# Patient Record
Sex: FEMALE | Race: WHITE | NOT HISPANIC OR LATINO | URBAN - METROPOLITAN AREA
[De-identification: names, ages, dates, MRNs, and addresses within clinical notes are randomized per-mention and may not be internally consistent; named-entity substitution may affect disease eponyms.]

---

## 2020-06-05 ENCOUNTER — EMERGENCY (EMERGENCY)
Facility: HOSPITAL | Age: 80
LOS: 1 days | End: 2020-06-05
Admitting: EMERGENCY MEDICINE
Payer: MEDICARE

## 2020-06-05 PROCEDURE — 99284 EMERGENCY DEPT VISIT MOD MDM: CPT

## 2022-01-01 ENCOUNTER — RESULT REVIEW (OUTPATIENT)
Age: 82
End: 2022-01-01

## 2022-01-01 ENCOUNTER — APPOINTMENT (OUTPATIENT)
Dept: NEUROSURGERY | Facility: HOSPITAL | Age: 82
End: 2022-01-01
Payer: MEDICARE

## 2022-01-01 ENCOUNTER — INPATIENT (INPATIENT)
Facility: HOSPITAL | Age: 82
LOS: 24 days | DRG: 820 | End: 2022-02-07
Attending: STUDENT IN AN ORGANIZED HEALTH CARE EDUCATION/TRAINING PROGRAM | Admitting: NEUROLOGICAL SURGERY
Payer: MEDICARE

## 2022-01-01 ENCOUNTER — TRANSCRIPTION ENCOUNTER (OUTPATIENT)
Age: 82
End: 2022-01-01

## 2022-01-01 ENCOUNTER — EMERGENCY (EMERGENCY)
Facility: HOSPITAL | Age: 82
LOS: 1 days | Discharge: HOME | End: 2022-01-01
Admitting: EMERGENCY MEDICINE
Payer: MEDICARE

## 2022-01-01 VITALS
HEART RATE: 125 BPM | OXYGEN SATURATION: 92 % | SYSTOLIC BLOOD PRESSURE: 69 MMHG | RESPIRATION RATE: 20 BRPM | DIASTOLIC BLOOD PRESSURE: 49 MMHG

## 2022-01-01 VITALS
RESPIRATION RATE: 16 BRPM | HEART RATE: 67 BPM | DIASTOLIC BLOOD PRESSURE: 82 MMHG | TEMPERATURE: 99 F | OXYGEN SATURATION: 98 % | SYSTOLIC BLOOD PRESSURE: 156 MMHG

## 2022-01-01 DIAGNOSIS — G93.89 OTHER SPECIFIED DISORDERS OF BRAIN: ICD-10-CM
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
## 2022-01-01 DIAGNOSIS — S09.8XXA OTHER SPECIFIED INJURIES OF HEAD, INITIAL ENCOUNTER: ICD-10-CM
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
## 2022-01-01 DIAGNOSIS — I10 ESSENTIAL (PRIMARY) HYPERTENSION: ICD-10-CM
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
## 2022-01-01 DIAGNOSIS — R55 SYNCOPE AND COLLAPSE: ICD-10-CM

## 2022-01-01 LAB
-  AMIKACIN: SIGNIFICANT CHANGE UP
-  AMOXICILLIN/CLAVULANIC ACID: SIGNIFICANT CHANGE UP
-  AMPICILLIN/SULBACTAM: SIGNIFICANT CHANGE UP
-  AMPICILLIN: SIGNIFICANT CHANGE UP
-  AZTREONAM: SIGNIFICANT CHANGE UP
-  CANDIDA ALBICANS: SIGNIFICANT CHANGE UP
-  CANDIDA GLABRATA: SIGNIFICANT CHANGE UP
-  CANDIDA KRUSEI: SIGNIFICANT CHANGE UP
-  CANDIDA PARAPSILOSIS: SIGNIFICANT CHANGE UP
-  CANDIDA TROPICALIS: SIGNIFICANT CHANGE UP
-  CEFAZOLIN: SIGNIFICANT CHANGE UP
-  CEFEPIME: SIGNIFICANT CHANGE UP
-  CEFOXITIN: SIGNIFICANT CHANGE UP
-  CEFTRIAXONE: SIGNIFICANT CHANGE UP
-  CIPROFLOXACIN: SIGNIFICANT CHANGE UP
-  COAGULASE NEGATIVE STAPHYLOCOCCUS: SIGNIFICANT CHANGE UP
-  ERTAPENEM: SIGNIFICANT CHANGE UP
-  GENTAMICIN: SIGNIFICANT CHANGE UP
-  IMIPENEM: SIGNIFICANT CHANGE UP
-  K. PNEUMONIAE GROUP: SIGNIFICANT CHANGE UP
-  KPC RESISTANCE GENE: SIGNIFICANT CHANGE UP
-  LEVOFLOXACIN: SIGNIFICANT CHANGE UP
-  MEROPENEM: SIGNIFICANT CHANGE UP
-  NITROFURANTOIN: SIGNIFICANT CHANGE UP
-  PIPERACILLIN/TAZOBACTAM: SIGNIFICANT CHANGE UP
-  STREPTOCOCCUS SP. (NOT GRP A, B OR S PNEUMONIAE): SIGNIFICANT CHANGE UP
-  TIGECYCLINE: SIGNIFICANT CHANGE UP
-  TOBRAMYCIN: SIGNIFICANT CHANGE UP
-  TRIMETHOPRIM/SULFAMETHOXAZOLE: SIGNIFICANT CHANGE UP
A BAUMANNII DNA SPEC QL NAA+PROBE: SIGNIFICANT CHANGE UP
A1C WITH ESTIMATED AVERAGE GLUCOSE RESULT: 5 % — SIGNIFICANT CHANGE UP (ref 4–5.6)
AFP-TM SERPL-MCNC: 3 NG/ML — SIGNIFICANT CHANGE UP
ALBUMIN SERPL ELPH-MCNC: 1.7 G/DL — LOW (ref 3.3–5.2)
ALBUMIN SERPL ELPH-MCNC: 2.1 G/DL — LOW (ref 3.3–5.2)
ALBUMIN SERPL ELPH-MCNC: 2.2 G/DL — LOW (ref 3.3–5.2)
ALBUMIN SERPL ELPH-MCNC: 2.7 G/DL — LOW (ref 3.3–5.2)
ALBUMIN SERPL ELPH-MCNC: 3.4 G/DL — SIGNIFICANT CHANGE UP (ref 3.3–5.2)
ALBUMIN SERPL ELPH-MCNC: 3.5 G/DL — SIGNIFICANT CHANGE UP (ref 3.3–5.2)
ALBUMIN SERPL ELPH-MCNC: 3.6 G/DL — SIGNIFICANT CHANGE UP (ref 3.3–5.2)
ALBUMIN SERPL ELPH-MCNC: 3.9 G/DL — SIGNIFICANT CHANGE UP (ref 3.3–5.2)
ALBUMIN SERPL ELPH-MCNC: 3.9 G/DL — SIGNIFICANT CHANGE UP (ref 3.3–5.2)
ALBUMIN SERPL ELPH-MCNC: 4.2 G/DL — SIGNIFICANT CHANGE UP (ref 3.3–5.2)
ALP SERPL-CCNC: 101 U/L — SIGNIFICANT CHANGE UP (ref 40–120)
ALP SERPL-CCNC: 70 U/L — SIGNIFICANT CHANGE UP (ref 40–120)
ALP SERPL-CCNC: 70 U/L — SIGNIFICANT CHANGE UP (ref 40–120)
ALP SERPL-CCNC: 79 U/L — SIGNIFICANT CHANGE UP (ref 40–120)
ALP SERPL-CCNC: 86 U/L — SIGNIFICANT CHANGE UP (ref 40–120)
ALP SERPL-CCNC: 94 U/L — SIGNIFICANT CHANGE UP (ref 40–120)
ALP SERPL-CCNC: 94 U/L — SIGNIFICANT CHANGE UP (ref 40–120)
ALP SERPL-CCNC: 96 U/L — SIGNIFICANT CHANGE UP (ref 40–120)
ALP SERPL-CCNC: 97 U/L — SIGNIFICANT CHANGE UP (ref 40–120)
ALP SERPL-CCNC: 97 U/L — SIGNIFICANT CHANGE UP (ref 40–120)
ALT FLD-CCNC: 14 U/L — SIGNIFICANT CHANGE UP
ALT FLD-CCNC: 18 U/L — SIGNIFICANT CHANGE UP
ALT FLD-CCNC: 18 U/L — SIGNIFICANT CHANGE UP
ALT FLD-CCNC: 23 U/L — SIGNIFICANT CHANGE UP
ALT FLD-CCNC: 26 U/L — SIGNIFICANT CHANGE UP
ALT FLD-CCNC: 26 U/L — SIGNIFICANT CHANGE UP
ALT FLD-CCNC: 27 U/L — SIGNIFICANT CHANGE UP
ALT FLD-CCNC: 30 U/L — SIGNIFICANT CHANGE UP
ALT FLD-CCNC: 31 U/L — SIGNIFICANT CHANGE UP
ALT FLD-CCNC: 39 U/L — HIGH
ANION GAP SERPL CALC-SCNC: 11 MMOL/L — SIGNIFICANT CHANGE UP (ref 5–17)
ANION GAP SERPL CALC-SCNC: 12 MMOL/L — SIGNIFICANT CHANGE UP (ref 5–17)
ANION GAP SERPL CALC-SCNC: 13 MMOL/L — SIGNIFICANT CHANGE UP (ref 5–17)
ANION GAP SERPL CALC-SCNC: 14 MMOL/L — SIGNIFICANT CHANGE UP (ref 5–17)
ANION GAP SERPL CALC-SCNC: 14 MMOL/L — SIGNIFICANT CHANGE UP (ref 5–17)
ANION GAP SERPL CALC-SCNC: 15 MMOL/L — SIGNIFICANT CHANGE UP (ref 5–17)
ANION GAP SERPL CALC-SCNC: 9 MMOL/L — SIGNIFICANT CHANGE UP (ref 5–17)
ANION GAP SERPL CALC-SCNC: 9 MMOL/L — SIGNIFICANT CHANGE UP (ref 5–17)
APPEARANCE UR: ABNORMAL
APPEARANCE UR: ABNORMAL
APPEARANCE UR: CLEAR — SIGNIFICANT CHANGE UP
APPEARANCE UR: CLEAR — SIGNIFICANT CHANGE UP
APTT BLD: 30.6 SEC — SIGNIFICANT CHANGE UP (ref 27.5–35.5)
APTT BLD: 35.4 SEC — SIGNIFICANT CHANGE UP (ref 27.5–35.5)
APTT BLD: 35.8 SEC — HIGH (ref 27.5–35.5)
APTT BLD: 38 SEC — HIGH (ref 27.5–35.5)
AST SERPL-CCNC: 13 U/L — SIGNIFICANT CHANGE UP
AST SERPL-CCNC: 14 U/L — SIGNIFICANT CHANGE UP
AST SERPL-CCNC: 15 U/L — SIGNIFICANT CHANGE UP
AST SERPL-CCNC: 17 U/L — SIGNIFICANT CHANGE UP
AST SERPL-CCNC: 19 U/L — SIGNIFICANT CHANGE UP
AST SERPL-CCNC: 21 U/L — SIGNIFICANT CHANGE UP
AST SERPL-CCNC: 21 U/L — SIGNIFICANT CHANGE UP
AST SERPL-CCNC: 22 U/L — SIGNIFICANT CHANGE UP
AST SERPL-CCNC: 24 U/L — SIGNIFICANT CHANGE UP
AST SERPL-CCNC: 27 U/L — SIGNIFICANT CHANGE UP
BACTERIA # UR AUTO: ABNORMAL
BACTERIA # UR AUTO: ABNORMAL
BASOPHILS # BLD AUTO: 0 K/UL — SIGNIFICANT CHANGE UP (ref 0–0.2)
BASOPHILS # BLD AUTO: 0 K/UL — SIGNIFICANT CHANGE UP (ref 0–0.2)
BASOPHILS # BLD AUTO: 0.05 K/UL — SIGNIFICANT CHANGE UP (ref 0–0.2)
BASOPHILS # BLD AUTO: 0.07 K/UL — SIGNIFICANT CHANGE UP (ref 0–0.2)
BASOPHILS NFR BLD AUTO: 0 % — SIGNIFICANT CHANGE UP (ref 0–2)
BASOPHILS NFR BLD AUTO: 0 % — SIGNIFICANT CHANGE UP (ref 0–2)
BASOPHILS NFR BLD AUTO: 0.2 % — SIGNIFICANT CHANGE UP (ref 0–2)
BASOPHILS NFR BLD AUTO: 1.1 % — SIGNIFICANT CHANGE UP (ref 0–2)
BILIRUB DIRECT SERPL-MCNC: 0.1 MG/DL — SIGNIFICANT CHANGE UP (ref 0–0.3)
BILIRUB DIRECT SERPL-MCNC: 0.2 MG/DL — SIGNIFICANT CHANGE UP (ref 0–0.3)
BILIRUB INDIRECT FLD-MCNC: 0.3 MG/DL — SIGNIFICANT CHANGE UP (ref 0.2–1)
BILIRUB INDIRECT FLD-MCNC: 0.5 MG/DL — SIGNIFICANT CHANGE UP (ref 0.2–1)
BILIRUB SERPL-MCNC: 0.4 MG/DL — SIGNIFICANT CHANGE UP (ref 0.4–2)
BILIRUB SERPL-MCNC: 0.4 MG/DL — SIGNIFICANT CHANGE UP (ref 0.4–2)
BILIRUB SERPL-MCNC: 0.5 MG/DL — SIGNIFICANT CHANGE UP (ref 0.4–2)
BILIRUB SERPL-MCNC: 0.5 MG/DL — SIGNIFICANT CHANGE UP (ref 0.4–2)
BILIRUB SERPL-MCNC: 0.6 MG/DL — SIGNIFICANT CHANGE UP (ref 0.4–2)
BILIRUB SERPL-MCNC: 0.6 MG/DL — SIGNIFICANT CHANGE UP (ref 0.4–2)
BILIRUB SERPL-MCNC: 0.7 MG/DL — SIGNIFICANT CHANGE UP (ref 0.4–2)
BILIRUB SERPL-MCNC: 0.8 MG/DL — SIGNIFICANT CHANGE UP (ref 0.4–2)
BILIRUB SERPL-MCNC: 0.8 MG/DL — SIGNIFICANT CHANGE UP (ref 0.4–2)
BILIRUB SERPL-MCNC: 0.9 MG/DL — SIGNIFICANT CHANGE UP (ref 0.4–2)
BILIRUB UR-MCNC: NEGATIVE — SIGNIFICANT CHANGE UP
BLD GP AB SCN SERPL QL: SIGNIFICANT CHANGE UP
BUN SERPL-MCNC: 13.3 MG/DL — SIGNIFICANT CHANGE UP (ref 8–20)
BUN SERPL-MCNC: 14.1 MG/DL — SIGNIFICANT CHANGE UP (ref 8–20)
BUN SERPL-MCNC: 16.1 MG/DL — SIGNIFICANT CHANGE UP (ref 8–20)
BUN SERPL-MCNC: 16.3 MG/DL — SIGNIFICANT CHANGE UP (ref 8–20)
BUN SERPL-MCNC: 16.5 MG/DL — SIGNIFICANT CHANGE UP (ref 8–20)
BUN SERPL-MCNC: 17.4 MG/DL — SIGNIFICANT CHANGE UP (ref 8–20)
BUN SERPL-MCNC: 18.9 MG/DL — SIGNIFICANT CHANGE UP (ref 8–20)
BUN SERPL-MCNC: 19.4 MG/DL — SIGNIFICANT CHANGE UP (ref 8–20)
BUN SERPL-MCNC: 19.9 MG/DL — SIGNIFICANT CHANGE UP (ref 8–20)
BUN SERPL-MCNC: 20.2 MG/DL — HIGH (ref 8–20)
BUN SERPL-MCNC: 20.4 MG/DL — HIGH (ref 8–20)
BUN SERPL-MCNC: 21.2 MG/DL — HIGH (ref 8–20)
BUN SERPL-MCNC: 21.6 MG/DL — HIGH (ref 8–20)
BUN SERPL-MCNC: 21.7 MG/DL — HIGH (ref 8–20)
BUN SERPL-MCNC: 22.2 MG/DL — HIGH (ref 8–20)
BUN SERPL-MCNC: 22.3 MG/DL — HIGH (ref 8–20)
BUN SERPL-MCNC: 23.2 MG/DL — HIGH (ref 8–20)
BUN SERPL-MCNC: 27.5 MG/DL — HIGH (ref 8–20)
BUN SERPL-MCNC: 41 MG/DL — HIGH (ref 8–20)
BUN SERPL-MCNC: 42.4 MG/DL — HIGH (ref 8–20)
BUN SERPL-MCNC: 7.5 MG/DL — LOW (ref 8–20)
BUN SERPL-MCNC: 9.4 MG/DL — SIGNIFICANT CHANGE UP (ref 8–20)
BUN SERPL-MCNC: 9.8 MG/DL — SIGNIFICANT CHANGE UP (ref 8–20)
BURR CELLS BLD QL SMEAR: PRESENT — SIGNIFICANT CHANGE UP
BURR CELLS BLD QL SMEAR: PRESENT — SIGNIFICANT CHANGE UP
CALCIUM SERPL-MCNC: 7.9 MG/DL — LOW (ref 8.6–10.2)
CALCIUM SERPL-MCNC: 8 MG/DL — LOW (ref 8.6–10.2)
CALCIUM SERPL-MCNC: 8.2 MG/DL — LOW (ref 8.6–10.2)
CALCIUM SERPL-MCNC: 8.3 MG/DL — LOW (ref 8.6–10.2)
CALCIUM SERPL-MCNC: 8.4 MG/DL — LOW (ref 8.6–10.2)
CALCIUM SERPL-MCNC: 8.6 MG/DL — SIGNIFICANT CHANGE UP (ref 8.6–10.2)
CALCIUM SERPL-MCNC: 8.6 MG/DL — SIGNIFICANT CHANGE UP (ref 8.6–10.2)
CALCIUM SERPL-MCNC: 8.7 MG/DL — SIGNIFICANT CHANGE UP (ref 8.6–10.2)
CALCIUM SERPL-MCNC: 8.8 MG/DL — SIGNIFICANT CHANGE UP (ref 8.6–10.2)
CALCIUM SERPL-MCNC: 8.9 MG/DL — SIGNIFICANT CHANGE UP (ref 8.6–10.2)
CALCIUM SERPL-MCNC: 9.1 MG/DL — SIGNIFICANT CHANGE UP (ref 8.6–10.2)
CALCIUM SERPL-MCNC: 9.2 MG/DL — SIGNIFICANT CHANGE UP (ref 8.6–10.2)
CANCER AG19-9 SERPL-ACNC: 5 U/ML — SIGNIFICANT CHANGE UP
CEA SERPL-MCNC: 1.6 NG/ML — SIGNIFICANT CHANGE UP (ref 0–3.8)
CHLORIDE SERPL-SCNC: 100 MMOL/L — SIGNIFICANT CHANGE UP (ref 98–107)
CHLORIDE SERPL-SCNC: 101 MMOL/L — SIGNIFICANT CHANGE UP (ref 98–107)
CHLORIDE SERPL-SCNC: 102 MMOL/L — SIGNIFICANT CHANGE UP (ref 98–107)
CHLORIDE SERPL-SCNC: 103 MMOL/L — SIGNIFICANT CHANGE UP (ref 98–107)
CHLORIDE SERPL-SCNC: 103 MMOL/L — SIGNIFICANT CHANGE UP (ref 98–107)
CHLORIDE SERPL-SCNC: 104 MMOL/L — SIGNIFICANT CHANGE UP (ref 98–107)
CHLORIDE SERPL-SCNC: 105 MMOL/L — SIGNIFICANT CHANGE UP (ref 98–107)
CHLORIDE SERPL-SCNC: 106 MMOL/L — SIGNIFICANT CHANGE UP (ref 98–107)
CHLORIDE SERPL-SCNC: 94 MMOL/L — LOW (ref 98–107)
CHLORIDE SERPL-SCNC: 95 MMOL/L — LOW (ref 98–107)
CHLORIDE SERPL-SCNC: 95 MMOL/L — LOW (ref 98–107)
CHLORIDE SERPL-SCNC: 97 MMOL/L — LOW (ref 98–107)
CHLORIDE SERPL-SCNC: 98 MMOL/L — SIGNIFICANT CHANGE UP (ref 98–107)
CHLORIDE SERPL-SCNC: 99 MMOL/L — SIGNIFICANT CHANGE UP (ref 98–107)
CHOLEST SERPL-MCNC: 170 MG/DL — SIGNIFICANT CHANGE UP
CO2 SERPL-SCNC: 17 MMOL/L — LOW (ref 22–29)
CO2 SERPL-SCNC: 18 MMOL/L — LOW (ref 22–29)
CO2 SERPL-SCNC: 19 MMOL/L — LOW (ref 22–29)
CO2 SERPL-SCNC: 19 MMOL/L — LOW (ref 22–29)
CO2 SERPL-SCNC: 20 MMOL/L — LOW (ref 22–29)
CO2 SERPL-SCNC: 21 MMOL/L — LOW (ref 22–29)
CO2 SERPL-SCNC: 22 MMOL/L — SIGNIFICANT CHANGE UP (ref 22–29)
CO2 SERPL-SCNC: 23 MMOL/L — SIGNIFICANT CHANGE UP (ref 22–29)
CO2 SERPL-SCNC: 25 MMOL/L — SIGNIFICANT CHANGE UP (ref 22–29)
CO2 SERPL-SCNC: 25 MMOL/L — SIGNIFICANT CHANGE UP (ref 22–29)
CO2 SERPL-SCNC: 26 MMOL/L — SIGNIFICANT CHANGE UP (ref 22–29)
COLOR SPEC: YELLOW — SIGNIFICANT CHANGE UP
COMMENT - URINE: SIGNIFICANT CHANGE UP
CORTIS AM PEAK SERPL-MCNC: 1.8 UG/DL — LOW (ref 6–18.4)
CREAT SERPL-MCNC: 0.22 MG/DL — LOW (ref 0.5–1.3)
CREAT SERPL-MCNC: 0.29 MG/DL — LOW (ref 0.5–1.3)
CREAT SERPL-MCNC: 0.3 MG/DL — LOW (ref 0.5–1.3)
CREAT SERPL-MCNC: 0.31 MG/DL — LOW (ref 0.5–1.3)
CREAT SERPL-MCNC: 0.32 MG/DL — LOW (ref 0.5–1.3)
CREAT SERPL-MCNC: 0.33 MG/DL — LOW (ref 0.5–1.3)
CREAT SERPL-MCNC: 0.37 MG/DL — LOW (ref 0.5–1.3)
CREAT SERPL-MCNC: 0.37 MG/DL — LOW (ref 0.5–1.3)
CREAT SERPL-MCNC: 0.38 MG/DL — LOW (ref 0.5–1.3)
CREAT SERPL-MCNC: 0.38 MG/DL — LOW (ref 0.5–1.3)
CREAT SERPL-MCNC: 0.4 MG/DL — LOW (ref 0.5–1.3)
CREAT SERPL-MCNC: 0.41 MG/DL — LOW (ref 0.5–1.3)
CREAT SERPL-MCNC: 0.44 MG/DL — LOW (ref 0.5–1.3)
CREAT SERPL-MCNC: 0.47 MG/DL — LOW (ref 0.5–1.3)
CREAT SERPL-MCNC: 0.5 MG/DL — SIGNIFICANT CHANGE UP (ref 0.5–1.3)
CREAT SERPL-MCNC: 0.53 MG/DL — SIGNIFICANT CHANGE UP (ref 0.5–1.3)
CREAT SERPL-MCNC: 0.71 MG/DL — SIGNIFICANT CHANGE UP (ref 0.5–1.3)
CREAT SERPL-MCNC: 0.72 MG/DL — SIGNIFICANT CHANGE UP (ref 0.5–1.3)
CULTURE RESULTS: SIGNIFICANT CHANGE UP
DACRYOCYTES BLD QL SMEAR: SLIGHT — SIGNIFICANT CHANGE UP
DIFF PNL FLD: ABNORMAL
E CLOAC COMP DNA BLD POS QL NAA+PROBE: SIGNIFICANT CHANGE UP
E COLI DNA BLD POS QL NAA+NON-PROBE: SIGNIFICANT CHANGE UP
ENTEROCOC DNA BLD POS QL NAA+NON-PROBE: SIGNIFICANT CHANGE UP
ENTEROCOC DNA BLD POS QL NAA+NON-PROBE: SIGNIFICANT CHANGE UP
EOSINOPHIL # BLD AUTO: 0 K/UL — SIGNIFICANT CHANGE UP (ref 0–0.5)
EOSINOPHIL # BLD AUTO: 0.21 K/UL — SIGNIFICANT CHANGE UP (ref 0–0.5)
EOSINOPHIL NFR BLD AUTO: 0 % — SIGNIFICANT CHANGE UP (ref 0–6)
EOSINOPHIL NFR BLD AUTO: 3.2 % — SIGNIFICANT CHANGE UP (ref 0–6)
EPI CELLS # UR: SIGNIFICANT CHANGE UP
ESTIMATED AVERAGE GLUCOSE: 97 MG/DL — SIGNIFICANT CHANGE UP (ref 68–114)
GIANT PLATELETS BLD QL SMEAR: PRESENT — SIGNIFICANT CHANGE UP
GLUCOSE BLDC GLUCOMTR-MCNC: 101 MG/DL — HIGH (ref 70–99)
GLUCOSE BLDC GLUCOMTR-MCNC: 102 MG/DL — HIGH (ref 70–99)
GLUCOSE BLDC GLUCOMTR-MCNC: 113 MG/DL — HIGH (ref 70–99)
GLUCOSE BLDC GLUCOMTR-MCNC: 114 MG/DL — HIGH (ref 70–99)
GLUCOSE BLDC GLUCOMTR-MCNC: 115 MG/DL — HIGH (ref 70–99)
GLUCOSE BLDC GLUCOMTR-MCNC: 117 MG/DL — HIGH (ref 70–99)
GLUCOSE BLDC GLUCOMTR-MCNC: 117 MG/DL — HIGH (ref 70–99)
GLUCOSE BLDC GLUCOMTR-MCNC: 119 MG/DL — HIGH (ref 70–99)
GLUCOSE BLDC GLUCOMTR-MCNC: 121 MG/DL — HIGH (ref 70–99)
GLUCOSE BLDC GLUCOMTR-MCNC: 121 MG/DL — HIGH (ref 70–99)
GLUCOSE BLDC GLUCOMTR-MCNC: 123 MG/DL — HIGH (ref 70–99)
GLUCOSE BLDC GLUCOMTR-MCNC: 123 MG/DL — HIGH (ref 70–99)
GLUCOSE BLDC GLUCOMTR-MCNC: 124 MG/DL — HIGH (ref 70–99)
GLUCOSE BLDC GLUCOMTR-MCNC: 126 MG/DL — HIGH (ref 70–99)
GLUCOSE BLDC GLUCOMTR-MCNC: 126 MG/DL — HIGH (ref 70–99)
GLUCOSE BLDC GLUCOMTR-MCNC: 127 MG/DL — HIGH (ref 70–99)
GLUCOSE BLDC GLUCOMTR-MCNC: 129 MG/DL — HIGH (ref 70–99)
GLUCOSE BLDC GLUCOMTR-MCNC: 129 MG/DL — HIGH (ref 70–99)
GLUCOSE BLDC GLUCOMTR-MCNC: 131 MG/DL — HIGH (ref 70–99)
GLUCOSE BLDC GLUCOMTR-MCNC: 131 MG/DL — HIGH (ref 70–99)
GLUCOSE BLDC GLUCOMTR-MCNC: 133 MG/DL — HIGH (ref 70–99)
GLUCOSE BLDC GLUCOMTR-MCNC: 133 MG/DL — HIGH (ref 70–99)
GLUCOSE BLDC GLUCOMTR-MCNC: 134 MG/DL — HIGH (ref 70–99)
GLUCOSE BLDC GLUCOMTR-MCNC: 135 MG/DL — HIGH (ref 70–99)
GLUCOSE BLDC GLUCOMTR-MCNC: 136 MG/DL — HIGH (ref 70–99)
GLUCOSE BLDC GLUCOMTR-MCNC: 138 MG/DL — HIGH (ref 70–99)
GLUCOSE BLDC GLUCOMTR-MCNC: 138 MG/DL — HIGH (ref 70–99)
GLUCOSE BLDC GLUCOMTR-MCNC: 140 MG/DL — HIGH (ref 70–99)
GLUCOSE BLDC GLUCOMTR-MCNC: 143 MG/DL — HIGH (ref 70–99)
GLUCOSE BLDC GLUCOMTR-MCNC: 147 MG/DL — HIGH (ref 70–99)
GLUCOSE BLDC GLUCOMTR-MCNC: 148 MG/DL — HIGH (ref 70–99)
GLUCOSE BLDC GLUCOMTR-MCNC: 149 MG/DL — HIGH (ref 70–99)
GLUCOSE BLDC GLUCOMTR-MCNC: 150 MG/DL — HIGH (ref 70–99)
GLUCOSE BLDC GLUCOMTR-MCNC: 151 MG/DL — HIGH (ref 70–99)
GLUCOSE BLDC GLUCOMTR-MCNC: 152 MG/DL — HIGH (ref 70–99)
GLUCOSE BLDC GLUCOMTR-MCNC: 154 MG/DL — HIGH (ref 70–99)
GLUCOSE BLDC GLUCOMTR-MCNC: 159 MG/DL — HIGH (ref 70–99)
GLUCOSE BLDC GLUCOMTR-MCNC: 160 MG/DL — HIGH (ref 70–99)
GLUCOSE BLDC GLUCOMTR-MCNC: 166 MG/DL — HIGH (ref 70–99)
GLUCOSE BLDC GLUCOMTR-MCNC: 172 MG/DL — HIGH (ref 70–99)
GLUCOSE BLDC GLUCOMTR-MCNC: 187 MG/DL — HIGH (ref 70–99)
GLUCOSE BLDC GLUCOMTR-MCNC: 195 MG/DL — HIGH (ref 70–99)
GLUCOSE BLDC GLUCOMTR-MCNC: 197 MG/DL — HIGH (ref 70–99)
GLUCOSE BLDC GLUCOMTR-MCNC: 199 MG/DL — HIGH (ref 70–99)
GLUCOSE BLDC GLUCOMTR-MCNC: 201 MG/DL — HIGH (ref 70–99)
GLUCOSE BLDC GLUCOMTR-MCNC: 203 MG/DL — HIGH (ref 70–99)
GLUCOSE BLDC GLUCOMTR-MCNC: 242 MG/DL — HIGH (ref 70–99)
GLUCOSE BLDC GLUCOMTR-MCNC: 74 MG/DL — SIGNIFICANT CHANGE UP (ref 70–99)
GLUCOSE BLDC GLUCOMTR-MCNC: 84 MG/DL — SIGNIFICANT CHANGE UP (ref 70–99)
GLUCOSE BLDC GLUCOMTR-MCNC: 87 MG/DL — SIGNIFICANT CHANGE UP (ref 70–99)
GLUCOSE SERPL-MCNC: 102 MG/DL — HIGH (ref 70–99)
GLUCOSE SERPL-MCNC: 104 MG/DL — HIGH (ref 70–99)
GLUCOSE SERPL-MCNC: 106 MG/DL — HIGH (ref 70–99)
GLUCOSE SERPL-MCNC: 109 MG/DL — HIGH (ref 70–99)
GLUCOSE SERPL-MCNC: 110 MG/DL — HIGH (ref 70–99)
GLUCOSE SERPL-MCNC: 110 MG/DL — HIGH (ref 70–99)
GLUCOSE SERPL-MCNC: 116 MG/DL — HIGH (ref 70–99)
GLUCOSE SERPL-MCNC: 117 MG/DL — HIGH (ref 70–99)
GLUCOSE SERPL-MCNC: 117 MG/DL — HIGH (ref 70–99)
GLUCOSE SERPL-MCNC: 118 MG/DL — HIGH (ref 70–99)
GLUCOSE SERPL-MCNC: 123 MG/DL — HIGH (ref 70–99)
GLUCOSE SERPL-MCNC: 125 MG/DL — HIGH (ref 70–99)
GLUCOSE SERPL-MCNC: 128 MG/DL — HIGH (ref 70–99)
GLUCOSE SERPL-MCNC: 133 MG/DL — HIGH (ref 70–99)
GLUCOSE SERPL-MCNC: 135 MG/DL — HIGH (ref 70–99)
GLUCOSE SERPL-MCNC: 136 MG/DL — HIGH (ref 70–99)
GLUCOSE SERPL-MCNC: 149 MG/DL — HIGH (ref 70–99)
GLUCOSE SERPL-MCNC: 149 MG/DL — HIGH (ref 70–99)
GLUCOSE SERPL-MCNC: 152 MG/DL — HIGH (ref 70–99)
GLUCOSE SERPL-MCNC: 160 MG/DL — HIGH (ref 70–99)
GLUCOSE SERPL-MCNC: 239 MG/DL — HIGH (ref 70–99)
GLUCOSE SERPL-MCNC: 87 MG/DL — SIGNIFICANT CHANGE UP (ref 70–99)
GLUCOSE SERPL-MCNC: 93 MG/DL — SIGNIFICANT CHANGE UP (ref 70–99)
GLUCOSE UR QL: NEGATIVE MG/DL — SIGNIFICANT CHANGE UP
GP B STREP DNA BLD POS QL NAA+NON-PROBE: SIGNIFICANT CHANGE UP
GRAM STN FLD: SIGNIFICANT CHANGE UP
GRAM STN FLD: SIGNIFICANT CHANGE UP
HAEM INFLU DNA BLD POS QL NAA+NON-PROBE: SIGNIFICANT CHANGE UP
HCT VFR BLD CALC: 35.9 % — SIGNIFICANT CHANGE UP (ref 34.5–45)
HCT VFR BLD CALC: 36.8 % — SIGNIFICANT CHANGE UP (ref 34.5–45)
HCT VFR BLD CALC: 36.9 % — SIGNIFICANT CHANGE UP (ref 34.5–45)
HCT VFR BLD CALC: 36.9 % — SIGNIFICANT CHANGE UP (ref 34.5–45)
HCT VFR BLD CALC: 37.1 % — SIGNIFICANT CHANGE UP (ref 34.5–45)
HCT VFR BLD CALC: 38.3 % — SIGNIFICANT CHANGE UP (ref 34.5–45)
HCT VFR BLD CALC: 38.6 % — SIGNIFICANT CHANGE UP (ref 34.5–45)
HCT VFR BLD CALC: 39.4 % — SIGNIFICANT CHANGE UP (ref 34.5–45)
HCT VFR BLD CALC: 39.6 % — SIGNIFICANT CHANGE UP (ref 34.5–45)
HCT VFR BLD CALC: 39.7 % — SIGNIFICANT CHANGE UP (ref 34.5–45)
HCT VFR BLD CALC: 39.8 % — SIGNIFICANT CHANGE UP (ref 34.5–45)
HCT VFR BLD CALC: 40 % — SIGNIFICANT CHANGE UP (ref 34.5–45)
HCT VFR BLD CALC: 40.8 % — SIGNIFICANT CHANGE UP (ref 34.5–45)
HCT VFR BLD CALC: 40.8 % — SIGNIFICANT CHANGE UP (ref 34.5–45)
HCT VFR BLD CALC: 41.4 % — SIGNIFICANT CHANGE UP (ref 34.5–45)
HCT VFR BLD CALC: 41.6 % — SIGNIFICANT CHANGE UP (ref 34.5–45)
HCT VFR BLD CALC: 42.1 % — SIGNIFICANT CHANGE UP (ref 34.5–45)
HCT VFR BLD CALC: 43.5 % — SIGNIFICANT CHANGE UP (ref 34.5–45)
HDLC SERPL-MCNC: 64 MG/DL — SIGNIFICANT CHANGE UP
HGB BLD-MCNC: 12 G/DL — SIGNIFICANT CHANGE UP (ref 11.5–15.5)
HGB BLD-MCNC: 12.3 G/DL — SIGNIFICANT CHANGE UP (ref 11.5–15.5)
HGB BLD-MCNC: 12.4 G/DL — SIGNIFICANT CHANGE UP (ref 11.5–15.5)
HGB BLD-MCNC: 13.2 G/DL — SIGNIFICANT CHANGE UP (ref 11.5–15.5)
HGB BLD-MCNC: 13.3 G/DL — SIGNIFICANT CHANGE UP (ref 11.5–15.5)
HGB BLD-MCNC: 13.4 G/DL — SIGNIFICANT CHANGE UP (ref 11.5–15.5)
HGB BLD-MCNC: 13.5 G/DL — SIGNIFICANT CHANGE UP (ref 11.5–15.5)
HGB BLD-MCNC: 13.5 G/DL — SIGNIFICANT CHANGE UP (ref 11.5–15.5)
HGB BLD-MCNC: 13.7 G/DL — SIGNIFICANT CHANGE UP (ref 11.5–15.5)
HGB BLD-MCNC: 13.7 G/DL — SIGNIFICANT CHANGE UP (ref 11.5–15.5)
HGB BLD-MCNC: 13.9 G/DL — SIGNIFICANT CHANGE UP (ref 11.5–15.5)
HGB BLD-MCNC: 13.9 G/DL — SIGNIFICANT CHANGE UP (ref 11.5–15.5)
HGB BLD-MCNC: 14 G/DL — SIGNIFICANT CHANGE UP (ref 11.5–15.5)
HGB BLD-MCNC: 14.2 G/DL — SIGNIFICANT CHANGE UP (ref 11.5–15.5)
HGB BLD-MCNC: 14.3 G/DL — SIGNIFICANT CHANGE UP (ref 11.5–15.5)
HGB BLD-MCNC: 14.7 G/DL — SIGNIFICANT CHANGE UP (ref 11.5–15.5)
IMM GRANULOCYTES NFR BLD AUTO: 0.3 % — SIGNIFICANT CHANGE UP (ref 0–1.5)
IMM GRANULOCYTES NFR BLD AUTO: 1.2 % — SIGNIFICANT CHANGE UP (ref 0–1.5)
INR BLD: 0.96 RATIO — SIGNIFICANT CHANGE UP (ref 0.88–1.16)
INR BLD: 0.97 RATIO — SIGNIFICANT CHANGE UP (ref 0.88–1.16)
INR BLD: 1.02 RATIO — SIGNIFICANT CHANGE UP (ref 0.88–1.16)
INR BLD: 1.04 RATIO — SIGNIFICANT CHANGE UP (ref 0.88–1.16)
INR BLD: 1.05 RATIO — SIGNIFICANT CHANGE UP (ref 0.88–1.16)
INR BLD: 1.28 RATIO — HIGH (ref 0.88–1.16)
K OXYTOCA DNA BLD POS QL NAA+NON-PROBE: SIGNIFICANT CHANGE UP
KETONES UR-MCNC: ABNORMAL
KETONES UR-MCNC: ABNORMAL
KETONES UR-MCNC: NEGATIVE — SIGNIFICANT CHANGE UP
KETONES UR-MCNC: NEGATIVE — SIGNIFICANT CHANGE UP
L MONOCYTOG DNA BLD POS QL NAA+NON-PROBE: SIGNIFICANT CHANGE UP
LEUKOCYTE ESTERASE UR-ACNC: ABNORMAL
LEUKOCYTE ESTERASE UR-ACNC: NEGATIVE — SIGNIFICANT CHANGE UP
LIPID PNL WITH DIRECT LDL SERPL: 93 MG/DL — SIGNIFICANT CHANGE UP
LYMPHOCYTES # BLD AUTO: 0 % — LOW (ref 13–44)
LYMPHOCYTES # BLD AUTO: 0 K/UL — LOW (ref 1–3.3)
LYMPHOCYTES # BLD AUTO: 0.27 K/UL — LOW (ref 1–3.3)
LYMPHOCYTES # BLD AUTO: 0.29 K/UL — LOW (ref 1–3.3)
LYMPHOCYTES # BLD AUTO: 0.9 % — LOW (ref 13–44)
LYMPHOCYTES # BLD AUTO: 1.1 % — LOW (ref 13–44)
LYMPHOCYTES # BLD AUTO: 1.15 K/UL — SIGNIFICANT CHANGE UP (ref 1–3.3)
LYMPHOCYTES # BLD AUTO: 17.6 % — SIGNIFICANT CHANGE UP (ref 13–44)
MAGNESIUM SERPL-MCNC: 2 MG/DL — SIGNIFICANT CHANGE UP (ref 1.6–2.6)
MAGNESIUM SERPL-MCNC: 2.1 MG/DL — SIGNIFICANT CHANGE UP (ref 1.6–2.6)
MAGNESIUM SERPL-MCNC: 2.1 MG/DL — SIGNIFICANT CHANGE UP (ref 1.8–2.6)
MAGNESIUM SERPL-MCNC: 2.3 MG/DL — SIGNIFICANT CHANGE UP (ref 1.6–2.6)
MAGNESIUM SERPL-MCNC: 2.4 MG/DL — SIGNIFICANT CHANGE UP (ref 1.8–2.6)
MANUAL SMEAR VERIFICATION: SIGNIFICANT CHANGE UP
MANUAL SMEAR VERIFICATION: SIGNIFICANT CHANGE UP
MCHC RBC-ENTMCNC: 30.5 PG — SIGNIFICANT CHANGE UP (ref 27–34)
MCHC RBC-ENTMCNC: 30.9 PG — SIGNIFICANT CHANGE UP (ref 27–34)
MCHC RBC-ENTMCNC: 30.9 PG — SIGNIFICANT CHANGE UP (ref 27–34)
MCHC RBC-ENTMCNC: 31.2 PG — SIGNIFICANT CHANGE UP (ref 27–34)
MCHC RBC-ENTMCNC: 31.3 PG — SIGNIFICANT CHANGE UP (ref 27–34)
MCHC RBC-ENTMCNC: 31.4 PG — SIGNIFICANT CHANGE UP (ref 27–34)
MCHC RBC-ENTMCNC: 31.5 PG — SIGNIFICANT CHANGE UP (ref 27–34)
MCHC RBC-ENTMCNC: 31.6 PG — SIGNIFICANT CHANGE UP (ref 27–34)
MCHC RBC-ENTMCNC: 31.7 PG — SIGNIFICANT CHANGE UP (ref 27–34)
MCHC RBC-ENTMCNC: 31.7 PG — SIGNIFICANT CHANGE UP (ref 27–34)
MCHC RBC-ENTMCNC: 31.9 PG — SIGNIFICANT CHANGE UP (ref 27–34)
MCHC RBC-ENTMCNC: 32 PG — SIGNIFICANT CHANGE UP (ref 27–34)
MCHC RBC-ENTMCNC: 33.3 GM/DL — SIGNIFICANT CHANGE UP (ref 32–36)
MCHC RBC-ENTMCNC: 33.4 GM/DL — SIGNIFICANT CHANGE UP (ref 32–36)
MCHC RBC-ENTMCNC: 33.6 GM/DL — SIGNIFICANT CHANGE UP (ref 32–36)
MCHC RBC-ENTMCNC: 33.6 GM/DL — SIGNIFICANT CHANGE UP (ref 32–36)
MCHC RBC-ENTMCNC: 33.7 GM/DL — SIGNIFICANT CHANGE UP (ref 32–36)
MCHC RBC-ENTMCNC: 33.7 GM/DL — SIGNIFICANT CHANGE UP (ref 32–36)
MCHC RBC-ENTMCNC: 33.8 GM/DL — SIGNIFICANT CHANGE UP (ref 32–36)
MCHC RBC-ENTMCNC: 33.8 GM/DL — SIGNIFICANT CHANGE UP (ref 32–36)
MCHC RBC-ENTMCNC: 34 GM/DL — SIGNIFICANT CHANGE UP (ref 32–36)
MCHC RBC-ENTMCNC: 34.1 GM/DL — SIGNIFICANT CHANGE UP (ref 32–36)
MCHC RBC-ENTMCNC: 34.3 GM/DL — SIGNIFICANT CHANGE UP (ref 32–36)
MCHC RBC-ENTMCNC: 34.4 GM/DL — SIGNIFICANT CHANGE UP (ref 32–36)
MCHC RBC-ENTMCNC: 34.5 GM/DL — SIGNIFICANT CHANGE UP (ref 32–36)
MCHC RBC-ENTMCNC: 35 GM/DL — SIGNIFICANT CHANGE UP (ref 32–36)
MCV RBC AUTO: 91.2 FL — SIGNIFICANT CHANGE UP (ref 80–100)
MCV RBC AUTO: 91.3 FL — SIGNIFICANT CHANGE UP (ref 80–100)
MCV RBC AUTO: 91.5 FL — SIGNIFICANT CHANGE UP (ref 80–100)
MCV RBC AUTO: 91.6 FL — SIGNIFICANT CHANGE UP (ref 80–100)
MCV RBC AUTO: 91.6 FL — SIGNIFICANT CHANGE UP (ref 80–100)
MCV RBC AUTO: 91.7 FL — SIGNIFICANT CHANGE UP (ref 80–100)
MCV RBC AUTO: 91.8 FL — SIGNIFICANT CHANGE UP (ref 80–100)
MCV RBC AUTO: 91.9 FL — SIGNIFICANT CHANGE UP (ref 80–100)
MCV RBC AUTO: 92.1 FL — SIGNIFICANT CHANGE UP (ref 80–100)
MCV RBC AUTO: 92.1 FL — SIGNIFICANT CHANGE UP (ref 80–100)
MCV RBC AUTO: 92.4 FL — SIGNIFICANT CHANGE UP (ref 80–100)
MCV RBC AUTO: 92.9 FL — SIGNIFICANT CHANGE UP (ref 80–100)
MCV RBC AUTO: 92.9 FL — SIGNIFICANT CHANGE UP (ref 80–100)
MCV RBC AUTO: 93.4 FL — SIGNIFICANT CHANGE UP (ref 80–100)
MCV RBC AUTO: 93.4 FL — SIGNIFICANT CHANGE UP (ref 80–100)
MCV RBC AUTO: 93.5 FL — SIGNIFICANT CHANGE UP (ref 80–100)
MCV RBC AUTO: 93.7 FL — SIGNIFICANT CHANGE UP (ref 80–100)
MCV RBC AUTO: 93.9 FL — SIGNIFICANT CHANGE UP (ref 80–100)
METAMYELOCYTES # FLD: 0.9 % — HIGH (ref 0–0)
METAMYELOCYTES # FLD: 6.9 % — HIGH (ref 0–0)
METHOD TYPE: SIGNIFICANT CHANGE UP
METHOD TYPE: SIGNIFICANT CHANGE UP
MONOCYTES # BLD AUTO: 0.27 K/UL — SIGNIFICANT CHANGE UP (ref 0–0.9)
MONOCYTES # BLD AUTO: 0.27 K/UL — SIGNIFICANT CHANGE UP (ref 0–0.9)
MONOCYTES # BLD AUTO: 0.71 K/UL — SIGNIFICANT CHANGE UP (ref 0–0.9)
MONOCYTES # BLD AUTO: 0.73 K/UL — SIGNIFICANT CHANGE UP (ref 0–0.9)
MONOCYTES NFR BLD AUTO: 0.9 % — LOW (ref 2–14)
MONOCYTES NFR BLD AUTO: 1.7 % — LOW (ref 2–14)
MONOCYTES NFR BLD AUTO: 11.2 % — SIGNIFICANT CHANGE UP (ref 2–14)
MONOCYTES NFR BLD AUTO: 2.8 % — SIGNIFICANT CHANGE UP (ref 2–14)
MRSA SPEC QL CULT: SIGNIFICANT CHANGE UP
MSSA DNA SPEC QL NAA+PROBE: SIGNIFICANT CHANGE UP
MYELOCYTES NFR BLD: 0.9 % — HIGH (ref 0–0)
N MEN ISLT CULT: SIGNIFICANT CHANGE UP
NEUTROPHILS # BLD AUTO: 15.49 K/UL — HIGH (ref 1.8–7.4)
NEUTROPHILS # BLD AUTO: 24.09 K/UL — HIGH (ref 1.8–7.4)
NEUTROPHILS # BLD AUTO: 27.61 K/UL — HIGH (ref 1.8–7.4)
NEUTROPHILS # BLD AUTO: 4.35 K/UL — SIGNIFICANT CHANGE UP (ref 1.8–7.4)
NEUTROPHILS NFR BLD AUTO: 66.6 % — SIGNIFICANT CHANGE UP (ref 43–77)
NEUTROPHILS NFR BLD AUTO: 73.9 % — SIGNIFICANT CHANGE UP (ref 43–77)
NEUTROPHILS NFR BLD AUTO: 92.2 % — HIGH (ref 43–77)
NEUTROPHILS NFR BLD AUTO: 94.7 % — HIGH (ref 43–77)
NEUTS BAND # BLD: 16.5 % — HIGH (ref 0–8)
NEUTS BAND # BLD: 5.2 % — SIGNIFICANT CHANGE UP (ref 0–8)
NITRITE UR-MCNC: NEGATIVE — SIGNIFICANT CHANGE UP
NON HDL CHOLESTEROL: 106 MG/DL — SIGNIFICANT CHANGE UP
ORGANISM # SPEC MICROSCOPIC CNT: SIGNIFICANT CHANGE UP
OSMOLALITY SERPL: 293 MOSMOL/KG — SIGNIFICANT CHANGE UP (ref 280–301)
OSMOLALITY UR: 569 MOSM/KG — SIGNIFICANT CHANGE UP (ref 300–1000)
OVALOCYTES BLD QL SMEAR: SLIGHT — SIGNIFICANT CHANGE UP
OVALOCYTES BLD QL SMEAR: SLIGHT — SIGNIFICANT CHANGE UP
PH UR: 6 — SIGNIFICANT CHANGE UP (ref 5–8)
PH UR: 6.5 — SIGNIFICANT CHANGE UP (ref 5–8)
PH UR: 7 — SIGNIFICANT CHANGE UP (ref 5–8)
PH UR: 7 — SIGNIFICANT CHANGE UP (ref 5–8)
PHOSPHATE SERPL-MCNC: 2.2 MG/DL — LOW (ref 2.4–4.7)
PHOSPHATE SERPL-MCNC: 2.4 MG/DL — SIGNIFICANT CHANGE UP (ref 2.4–4.7)
PHOSPHATE SERPL-MCNC: 2.5 MG/DL — SIGNIFICANT CHANGE UP (ref 2.4–4.7)
PHOSPHATE SERPL-MCNC: 3.1 MG/DL — SIGNIFICANT CHANGE UP (ref 2.4–4.7)
PHOSPHATE SERPL-MCNC: 3.3 MG/DL — SIGNIFICANT CHANGE UP (ref 2.4–4.7)
PHOSPHATE SERPL-MCNC: 3.4 MG/DL — SIGNIFICANT CHANGE UP (ref 2.4–4.7)
PHOSPHATE SERPL-MCNC: 3.8 MG/DL — SIGNIFICANT CHANGE UP (ref 2.4–4.7)
PHOSPHATE SERPL-MCNC: 3.9 MG/DL — SIGNIFICANT CHANGE UP (ref 2.4–4.7)
PHOSPHATE SERPL-MCNC: 4 MG/DL — SIGNIFICANT CHANGE UP (ref 2.4–4.7)
PLAT MORPH BLD: ABNORMAL
PLAT MORPH BLD: NORMAL — SIGNIFICANT CHANGE UP
PLATELET # BLD AUTO: 139 K/UL — LOW (ref 150–400)
PLATELET # BLD AUTO: 154 K/UL — SIGNIFICANT CHANGE UP (ref 150–400)
PLATELET # BLD AUTO: 188 K/UL — SIGNIFICANT CHANGE UP (ref 150–400)
PLATELET # BLD AUTO: 249 K/UL — SIGNIFICANT CHANGE UP (ref 150–400)
PLATELET # BLD AUTO: 259 K/UL — SIGNIFICANT CHANGE UP (ref 150–400)
PLATELET # BLD AUTO: 261 K/UL — SIGNIFICANT CHANGE UP (ref 150–400)
PLATELET # BLD AUTO: 269 K/UL — SIGNIFICANT CHANGE UP (ref 150–400)
PLATELET # BLD AUTO: 270 K/UL — SIGNIFICANT CHANGE UP (ref 150–400)
PLATELET # BLD AUTO: 270 K/UL — SIGNIFICANT CHANGE UP (ref 150–400)
PLATELET # BLD AUTO: 273 K/UL — SIGNIFICANT CHANGE UP (ref 150–400)
PLATELET # BLD AUTO: 273 K/UL — SIGNIFICANT CHANGE UP (ref 150–400)
PLATELET # BLD AUTO: 276 K/UL — SIGNIFICANT CHANGE UP (ref 150–400)
PLATELET # BLD AUTO: 285 K/UL — SIGNIFICANT CHANGE UP (ref 150–400)
PLATELET # BLD AUTO: 286 K/UL — SIGNIFICANT CHANGE UP (ref 150–400)
PLATELET # BLD AUTO: 288 K/UL — SIGNIFICANT CHANGE UP (ref 150–400)
PLATELET # BLD AUTO: 294 K/UL — SIGNIFICANT CHANGE UP (ref 150–400)
PLATELET # BLD AUTO: 294 K/UL — SIGNIFICANT CHANGE UP (ref 150–400)
PLATELET # BLD AUTO: 332 K/UL — SIGNIFICANT CHANGE UP (ref 150–400)
POIKILOCYTOSIS BLD QL AUTO: SIGNIFICANT CHANGE UP
POIKILOCYTOSIS BLD QL AUTO: SIGNIFICANT CHANGE UP
POLYCHROMASIA BLD QL SMEAR: SLIGHT — SIGNIFICANT CHANGE UP
POTASSIUM SERPL-MCNC: 3.5 MMOL/L — SIGNIFICANT CHANGE UP (ref 3.5–5.3)
POTASSIUM SERPL-MCNC: 3.7 MMOL/L — SIGNIFICANT CHANGE UP (ref 3.5–5.3)
POTASSIUM SERPL-MCNC: 3.8 MMOL/L — SIGNIFICANT CHANGE UP (ref 3.5–5.3)
POTASSIUM SERPL-MCNC: 3.9 MMOL/L — SIGNIFICANT CHANGE UP (ref 3.5–5.3)
POTASSIUM SERPL-MCNC: 4.1 MMOL/L — SIGNIFICANT CHANGE UP (ref 3.5–5.3)
POTASSIUM SERPL-MCNC: 4.2 MMOL/L — SIGNIFICANT CHANGE UP (ref 3.5–5.3)
POTASSIUM SERPL-MCNC: 4.3 MMOL/L — SIGNIFICANT CHANGE UP (ref 3.5–5.3)
POTASSIUM SERPL-MCNC: 4.4 MMOL/L — SIGNIFICANT CHANGE UP (ref 3.5–5.3)
POTASSIUM SERPL-MCNC: 4.4 MMOL/L — SIGNIFICANT CHANGE UP (ref 3.5–5.3)
POTASSIUM SERPL-MCNC: 4.5 MMOL/L — SIGNIFICANT CHANGE UP (ref 3.5–5.3)
POTASSIUM SERPL-MCNC: 4.6 MMOL/L — SIGNIFICANT CHANGE UP (ref 3.5–5.3)
POTASSIUM SERPL-MCNC: 4.6 MMOL/L — SIGNIFICANT CHANGE UP (ref 3.5–5.3)
POTASSIUM SERPL-MCNC: 4.9 MMOL/L — SIGNIFICANT CHANGE UP (ref 3.5–5.3)
POTASSIUM SERPL-MCNC: 4.9 MMOL/L — SIGNIFICANT CHANGE UP (ref 3.5–5.3)
POTASSIUM SERPL-MCNC: 5.2 MMOL/L — SIGNIFICANT CHANGE UP (ref 3.5–5.3)
POTASSIUM SERPL-MCNC: 5.4 MMOL/L — HIGH (ref 3.5–5.3)
POTASSIUM SERPL-SCNC: 3.5 MMOL/L — SIGNIFICANT CHANGE UP (ref 3.5–5.3)
POTASSIUM SERPL-SCNC: 3.7 MMOL/L — SIGNIFICANT CHANGE UP (ref 3.5–5.3)
POTASSIUM SERPL-SCNC: 3.8 MMOL/L — SIGNIFICANT CHANGE UP (ref 3.5–5.3)
POTASSIUM SERPL-SCNC: 3.9 MMOL/L — SIGNIFICANT CHANGE UP (ref 3.5–5.3)
POTASSIUM SERPL-SCNC: 4.1 MMOL/L — SIGNIFICANT CHANGE UP (ref 3.5–5.3)
POTASSIUM SERPL-SCNC: 4.2 MMOL/L — SIGNIFICANT CHANGE UP (ref 3.5–5.3)
POTASSIUM SERPL-SCNC: 4.3 MMOL/L — SIGNIFICANT CHANGE UP (ref 3.5–5.3)
POTASSIUM SERPL-SCNC: 4.4 MMOL/L — SIGNIFICANT CHANGE UP (ref 3.5–5.3)
POTASSIUM SERPL-SCNC: 4.4 MMOL/L — SIGNIFICANT CHANGE UP (ref 3.5–5.3)
POTASSIUM SERPL-SCNC: 4.5 MMOL/L — SIGNIFICANT CHANGE UP (ref 3.5–5.3)
POTASSIUM SERPL-SCNC: 4.6 MMOL/L — SIGNIFICANT CHANGE UP (ref 3.5–5.3)
POTASSIUM SERPL-SCNC: 4.6 MMOL/L — SIGNIFICANT CHANGE UP (ref 3.5–5.3)
POTASSIUM SERPL-SCNC: 4.9 MMOL/L — SIGNIFICANT CHANGE UP (ref 3.5–5.3)
POTASSIUM SERPL-SCNC: 4.9 MMOL/L — SIGNIFICANT CHANGE UP (ref 3.5–5.3)
POTASSIUM SERPL-SCNC: 5.2 MMOL/L — SIGNIFICANT CHANGE UP (ref 3.5–5.3)
POTASSIUM SERPL-SCNC: 5.4 MMOL/L — HIGH (ref 3.5–5.3)
PREALB SERPL-MCNC: 20 MG/DL — SIGNIFICANT CHANGE UP (ref 18–38)
PROT SERPL-MCNC: 4.6 G/DL — LOW (ref 6.6–8.7)
PROT SERPL-MCNC: 4.8 G/DL — LOW (ref 6.6–8.7)
PROT SERPL-MCNC: 4.8 G/DL — LOW (ref 6.6–8.7)
PROT SERPL-MCNC: 5 G/DL — LOW (ref 6.6–8.7)
PROT SERPL-MCNC: 5 G/DL — LOW (ref 6.6–8.7)
PROT SERPL-MCNC: 5.1 G/DL — LOW (ref 6.6–8.7)
PROT SERPL-MCNC: 5.5 G/DL — LOW (ref 6.6–8.7)
PROT SERPL-MCNC: 5.7 G/DL — LOW (ref 6.6–8.7)
PROT SERPL-MCNC: 6 G/DL — LOW (ref 6.6–8.7)
PROT SERPL-MCNC: 6.1 G/DL — LOW (ref 6.6–8.7)
PROT UR-MCNC: 15
PROT UR-MCNC: NEGATIVE — SIGNIFICANT CHANGE UP
PROTEUS SP DNA BLD POS QL NAA+NON-PROBE: SIGNIFICANT CHANGE UP
PROTHROM AB SERPL-ACNC: 11.2 SEC — SIGNIFICANT CHANGE UP (ref 10.6–13.6)
PROTHROM AB SERPL-ACNC: 11.3 SEC — SIGNIFICANT CHANGE UP (ref 10.6–13.6)
PROTHROM AB SERPL-ACNC: 11.8 SEC — SIGNIFICANT CHANGE UP (ref 10.6–13.6)
PROTHROM AB SERPL-ACNC: 12 SEC — SIGNIFICANT CHANGE UP (ref 10.6–13.6)
PROTHROM AB SERPL-ACNC: 12.2 SEC — SIGNIFICANT CHANGE UP (ref 10.6–13.6)
PROTHROM AB SERPL-ACNC: 14.7 SEC — HIGH (ref 10.6–13.6)
RBC # BLD: 3.83 M/UL — SIGNIFICANT CHANGE UP (ref 3.8–5.2)
RBC # BLD: 3.94 M/UL — SIGNIFICANT CHANGE UP (ref 3.8–5.2)
RBC # BLD: 3.95 M/UL — SIGNIFICANT CHANGE UP (ref 3.8–5.2)
RBC # BLD: 3.97 M/UL — SIGNIFICANT CHANGE UP (ref 3.8–5.2)
RBC # BLD: 4.03 M/UL — SIGNIFICANT CHANGE UP (ref 3.8–5.2)
RBC # BLD: 4.17 M/UL — SIGNIFICANT CHANGE UP (ref 3.8–5.2)
RBC # BLD: 4.23 M/UL — SIGNIFICANT CHANGE UP (ref 3.8–5.2)
RBC # BLD: 4.3 M/UL — SIGNIFICANT CHANGE UP (ref 3.8–5.2)
RBC # BLD: 4.31 M/UL — SIGNIFICANT CHANGE UP (ref 3.8–5.2)
RBC # BLD: 4.32 M/UL — SIGNIFICANT CHANGE UP (ref 3.8–5.2)
RBC # BLD: 4.33 M/UL — SIGNIFICANT CHANGE UP (ref 3.8–5.2)
RBC # BLD: 4.37 M/UL — SIGNIFICANT CHANGE UP (ref 3.8–5.2)
RBC # BLD: 4.39 M/UL — SIGNIFICANT CHANGE UP (ref 3.8–5.2)
RBC # BLD: 4.39 M/UL — SIGNIFICANT CHANGE UP (ref 3.8–5.2)
RBC # BLD: 4.43 M/UL — SIGNIFICANT CHANGE UP (ref 3.8–5.2)
RBC # BLD: 4.48 M/UL — SIGNIFICANT CHANGE UP (ref 3.8–5.2)
RBC # BLD: 4.59 M/UL — SIGNIFICANT CHANGE UP (ref 3.8–5.2)
RBC # BLD: 4.75 M/UL — SIGNIFICANT CHANGE UP (ref 3.8–5.2)
RBC # FLD: 13.1 % — SIGNIFICANT CHANGE UP (ref 10.3–14.5)
RBC # FLD: 13.2 % — SIGNIFICANT CHANGE UP (ref 10.3–14.5)
RBC # FLD: 13.2 % — SIGNIFICANT CHANGE UP (ref 10.3–14.5)
RBC # FLD: 13.3 % — SIGNIFICANT CHANGE UP (ref 10.3–14.5)
RBC # FLD: 13.5 % — SIGNIFICANT CHANGE UP (ref 10.3–14.5)
RBC # FLD: 13.6 % — SIGNIFICANT CHANGE UP (ref 10.3–14.5)
RBC # FLD: 13.7 % — SIGNIFICANT CHANGE UP (ref 10.3–14.5)
RBC # FLD: 13.7 % — SIGNIFICANT CHANGE UP (ref 10.3–14.5)
RBC # FLD: 13.8 % — SIGNIFICANT CHANGE UP (ref 10.3–14.5)
RBC # FLD: 14 % — SIGNIFICANT CHANGE UP (ref 10.3–14.5)
RBC # FLD: 14.3 % — SIGNIFICANT CHANGE UP (ref 10.3–14.5)
RBC BLD AUTO: ABNORMAL
RBC BLD AUTO: ABNORMAL
RBC CASTS # UR COMP ASSIST: ABNORMAL /HPF (ref 0–4)
RBC CASTS # UR COMP ASSIST: SIGNIFICANT CHANGE UP /HPF (ref 0–4)
S MARCESCENS DNA BLD POS NAA+NON-PROBE: SIGNIFICANT CHANGE UP
S PNEUM DNA BLD POS QL NAA+NON-PROBE: SIGNIFICANT CHANGE UP
S PYO DNA BLD POS QL NAA+NON-PROBE: SIGNIFICANT CHANGE UP
SARS-COV-2 RNA SPEC QL NAA+PROBE: SIGNIFICANT CHANGE UP
SODIUM SERPL-SCNC: 127 MMOL/L — LOW (ref 135–145)
SODIUM SERPL-SCNC: 129 MMOL/L — LOW (ref 135–145)
SODIUM SERPL-SCNC: 130 MMOL/L — LOW (ref 135–145)
SODIUM SERPL-SCNC: 131 MMOL/L — LOW (ref 135–145)
SODIUM SERPL-SCNC: 132 MMOL/L — LOW (ref 135–145)
SODIUM SERPL-SCNC: 132 MMOL/L — LOW (ref 135–145)
SODIUM SERPL-SCNC: 133 MMOL/L — LOW (ref 135–145)
SODIUM SERPL-SCNC: 134 MMOL/L — LOW (ref 135–145)
SODIUM SERPL-SCNC: 135 MMOL/L — SIGNIFICANT CHANGE UP (ref 135–145)
SODIUM SERPL-SCNC: 136 MMOL/L — SIGNIFICANT CHANGE UP (ref 135–145)
SODIUM SERPL-SCNC: 136 MMOL/L — SIGNIFICANT CHANGE UP (ref 135–145)
SODIUM SERPL-SCNC: 137 MMOL/L — SIGNIFICANT CHANGE UP (ref 135–145)
SODIUM SERPL-SCNC: 137 MMOL/L — SIGNIFICANT CHANGE UP (ref 135–145)
SODIUM SERPL-SCNC: 138 MMOL/L — SIGNIFICANT CHANGE UP (ref 135–145)
SODIUM SERPL-SCNC: 139 MMOL/L — SIGNIFICANT CHANGE UP (ref 135–145)
SODIUM SERPL-SCNC: 139 MMOL/L — SIGNIFICANT CHANGE UP (ref 135–145)
SODIUM SERPL-SCNC: 140 MMOL/L — SIGNIFICANT CHANGE UP (ref 135–145)
SODIUM UR-SCNC: 39 MMOL/L — SIGNIFICANT CHANGE UP
SP GR SPEC: 1.01 — SIGNIFICANT CHANGE UP (ref 1.01–1.02)
SPECIMEN SOURCE: SIGNIFICANT CHANGE UP
SURGICAL PATHOLOGY STUDY: SIGNIFICANT CHANGE UP
SURGICAL PATHOLOGY STUDY: SIGNIFICANT CHANGE UP
TRIGL SERPL-MCNC: 64 MG/DL — SIGNIFICANT CHANGE UP
TSH SERPL-MCNC: 3.44 UIU/ML — SIGNIFICANT CHANGE UP (ref 0.27–4.2)
URATE SERPL-MCNC: 2.8 MG/DL — SIGNIFICANT CHANGE UP (ref 2.4–5.7)
UROBILINOGEN FLD QL: 4 MG/DL
UROBILINOGEN FLD QL: NEGATIVE MG/DL — SIGNIFICANT CHANGE UP
VALPROATE SERPL-MCNC: 56.2 UG/ML — SIGNIFICANT CHANGE UP (ref 50–100)
WBC # BLD: 10.37 K/UL — SIGNIFICANT CHANGE UP (ref 3.8–10.5)
WBC # BLD: 11.42 K/UL — HIGH (ref 3.8–10.5)
WBC # BLD: 11.61 K/UL — HIGH (ref 3.8–10.5)
WBC # BLD: 12.55 K/UL — HIGH (ref 3.8–10.5)
WBC # BLD: 12.77 K/UL — HIGH (ref 3.8–10.5)
WBC # BLD: 13.41 K/UL — HIGH (ref 3.8–10.5)
WBC # BLD: 15.9 K/UL — HIGH (ref 3.8–10.5)
WBC # BLD: 19.68 K/UL — HIGH (ref 3.8–10.5)
WBC # BLD: 19.73 K/UL — HIGH (ref 3.8–10.5)
WBC # BLD: 21.79 K/UL — HIGH (ref 3.8–10.5)
WBC # BLD: 25.44 K/UL — HIGH (ref 3.8–10.5)
WBC # BLD: 29.02 K/UL — HIGH (ref 3.8–10.5)
WBC # BLD: 30.38 K/UL — HIGH (ref 3.8–10.5)
WBC # BLD: 30.54 K/UL — HIGH (ref 3.8–10.5)
WBC # BLD: 5.64 K/UL — SIGNIFICANT CHANGE UP (ref 3.8–10.5)
WBC # BLD: 5.85 K/UL — SIGNIFICANT CHANGE UP (ref 3.8–10.5)
WBC # BLD: 6.46 K/UL — SIGNIFICANT CHANGE UP (ref 3.8–10.5)
WBC # BLD: 6.53 K/UL — SIGNIFICANT CHANGE UP (ref 3.8–10.5)
WBC # FLD AUTO: 10.37 K/UL — SIGNIFICANT CHANGE UP (ref 3.8–10.5)
WBC # FLD AUTO: 11.42 K/UL — HIGH (ref 3.8–10.5)
WBC # FLD AUTO: 11.61 K/UL — HIGH (ref 3.8–10.5)
WBC # FLD AUTO: 12.55 K/UL — HIGH (ref 3.8–10.5)
WBC # FLD AUTO: 12.77 K/UL — HIGH (ref 3.8–10.5)
WBC # FLD AUTO: 13.41 K/UL — HIGH (ref 3.8–10.5)
WBC # FLD AUTO: 15.9 K/UL — HIGH (ref 3.8–10.5)
WBC # FLD AUTO: 19.68 K/UL — HIGH (ref 3.8–10.5)
WBC # FLD AUTO: 19.73 K/UL — HIGH (ref 3.8–10.5)
WBC # FLD AUTO: 21.79 K/UL — HIGH (ref 3.8–10.5)
WBC # FLD AUTO: 25.44 K/UL — HIGH (ref 3.8–10.5)
WBC # FLD AUTO: 29.02 K/UL — HIGH (ref 3.8–10.5)
WBC # FLD AUTO: 30.38 K/UL — HIGH (ref 3.8–10.5)
WBC # FLD AUTO: 30.54 K/UL — HIGH (ref 3.8–10.5)
WBC # FLD AUTO: 5.64 K/UL — SIGNIFICANT CHANGE UP (ref 3.8–10.5)
WBC # FLD AUTO: 5.85 K/UL — SIGNIFICANT CHANGE UP (ref 3.8–10.5)
WBC # FLD AUTO: 6.46 K/UL — SIGNIFICANT CHANGE UP (ref 3.8–10.5)
WBC # FLD AUTO: 6.53 K/UL — SIGNIFICANT CHANGE UP (ref 3.8–10.5)
WBC UR QL: ABNORMAL /HPF (ref 0–5)
WBC UR QL: SIGNIFICANT CHANGE UP
WBC UR QL: SIGNIFICANT CHANGE UP
WBC UR QL: SIGNIFICANT CHANGE UP /HPF (ref 0–5)

## 2022-01-01 PROCEDURE — 99497 ADVNCD CARE PLAN 30 MIN: CPT | Mod: 25

## 2022-01-01 PROCEDURE — 71045 X-RAY EXAM CHEST 1 VIEW: CPT | Mod: 26

## 2022-01-01 PROCEDURE — 83930 ASSAY OF BLOOD OSMOLALITY: CPT

## 2022-01-01 PROCEDURE — 69990 MICROSURGERY ADD-ON: CPT | Mod: 82,59

## 2022-01-01 PROCEDURE — 80053 COMPREHEN METABOLIC PANEL: CPT

## 2022-01-01 PROCEDURE — 69990 MICROSURGERY ADD-ON: CPT | Mod: 59

## 2022-01-01 PROCEDURE — 99233 SBSQ HOSP IP/OBS HIGH 50: CPT

## 2022-01-01 PROCEDURE — 99024 POSTOP FOLLOW-UP VISIT: CPT

## 2022-01-01 PROCEDURE — 99233 SBSQ HOSP IP/OBS HIGH 50: CPT | Mod: GC

## 2022-01-01 PROCEDURE — 61510 CRNEC TREPH EXC BRN TUM STTL: CPT

## 2022-01-01 PROCEDURE — 71260 CT THORAX DX C+: CPT | Mod: MA

## 2022-01-01 PROCEDURE — 70450 CT HEAD/BRAIN W/O DYE: CPT | Mod: 26

## 2022-01-01 PROCEDURE — 85025 COMPLETE CBC W/AUTO DIFF WBC: CPT

## 2022-01-01 PROCEDURE — 88305 TISSUE EXAM BY PATHOLOGIST: CPT | Mod: 26,59

## 2022-01-01 PROCEDURE — 82248 BILIRUBIN DIRECT: CPT

## 2022-01-01 PROCEDURE — 99232 SBSQ HOSP IP/OBS MODERATE 35: CPT

## 2022-01-01 PROCEDURE — 85730 THROMBOPLASTIN TIME PARTIAL: CPT

## 2022-01-01 PROCEDURE — 88173 CYTOPATH EVAL FNA REPORT: CPT | Mod: 26

## 2022-01-01 PROCEDURE — 74018 RADEX ABDOMEN 1 VIEW: CPT

## 2022-01-01 PROCEDURE — 74176 CT ABD & PELVIS W/O CONTRAST: CPT

## 2022-01-01 PROCEDURE — C1713: CPT

## 2022-01-01 PROCEDURE — 61510 CRNEC TREPH EXC BRN TUM STTL: CPT | Mod: 82

## 2022-01-01 PROCEDURE — 99291 CRITICAL CARE FIRST HOUR: CPT | Mod: 24

## 2022-01-01 PROCEDURE — 43752 NASAL/OROGASTRIC W/TUBE PLMT: CPT

## 2022-01-01 PROCEDURE — 70450 CT HEAD/BRAIN W/O DYE: CPT

## 2022-01-01 PROCEDURE — 80061 LIPID PANEL: CPT

## 2022-01-01 PROCEDURE — 97167 OT EVAL HIGH COMPLEX 60 MIN: CPT

## 2022-01-01 PROCEDURE — 84100 ASSAY OF PHOSPHORUS: CPT

## 2022-01-01 PROCEDURE — 70450 CT HEAD/BRAIN W/O DYE: CPT | Mod: 26,77

## 2022-01-01 PROCEDURE — 93010 ELECTROCARDIOGRAM REPORT: CPT

## 2022-01-01 PROCEDURE — U0005: CPT

## 2022-01-01 PROCEDURE — 88342 IMHCHEM/IMCYTCHM 1ST ANTB: CPT | Mod: 26

## 2022-01-01 PROCEDURE — 36415 COLL VENOUS BLD VENIPUNCTURE: CPT

## 2022-01-01 PROCEDURE — 70553 MRI BRAIN STEM W/O & W/DYE: CPT | Mod: 26

## 2022-01-01 PROCEDURE — 83036 HEMOGLOBIN GLYCOSYLATED A1C: CPT

## 2022-01-01 PROCEDURE — G1004: CPT

## 2022-01-01 PROCEDURE — 87210 SMEAR WET MOUNT SALINE/INK: CPT

## 2022-01-01 PROCEDURE — 93306 TTE W/DOPPLER COMPLETE: CPT

## 2022-01-01 PROCEDURE — 81001 URINALYSIS AUTO W/SCOPE: CPT

## 2022-01-01 PROCEDURE — 74177 CT ABD & PELVIS W/CONTRAST: CPT | Mod: 26

## 2022-01-01 PROCEDURE — 70552 MRI BRAIN STEM W/DYE: CPT | Mod: 26

## 2022-01-01 PROCEDURE — 88172 CYTP DX EVAL FNA 1ST EA SITE: CPT | Mod: 26

## 2022-01-01 PROCEDURE — 70553 MRI BRAIN STEM W/O & W/DYE: CPT | Mod: MG

## 2022-01-01 PROCEDURE — 80048 BASIC METABOLIC PNL TOTAL CA: CPT

## 2022-01-01 PROCEDURE — 88173 CYTOPATH EVAL FNA REPORT: CPT

## 2022-01-01 PROCEDURE — 99223 1ST HOSP IP/OBS HIGH 75: CPT

## 2022-01-01 PROCEDURE — 43242 EGD US FINE NEEDLE BX/ASPIR: CPT

## 2022-01-01 PROCEDURE — 93005 ELECTROCARDIOGRAM TRACING: CPT

## 2022-01-01 PROCEDURE — 87086 URINE CULTURE/COLONY COUNT: CPT

## 2022-01-01 PROCEDURE — 88365 INSITU HYBRIDIZATION (FISH): CPT | Mod: 26

## 2022-01-01 PROCEDURE — 99498 ADVNCD CARE PLAN ADDL 30 MIN: CPT

## 2022-01-01 PROCEDURE — 93306 TTE W/DOPPLER COMPLETE: CPT | Mod: 26

## 2022-01-01 PROCEDURE — 70552 MRI BRAIN STEM W/DYE: CPT

## 2022-01-01 PROCEDURE — 88365 INSITU HYBRIDIZATION (FISH): CPT

## 2022-01-01 PROCEDURE — 99497 ADVNCD CARE PLAN 30 MIN: CPT

## 2022-01-01 PROCEDURE — 99222 1ST HOSP IP/OBS MODERATE 55: CPT

## 2022-01-01 PROCEDURE — 88342 IMHCHEM/IMCYTCHM 1ST ANTB: CPT

## 2022-01-01 PROCEDURE — 88307 TISSUE EXAM BY PATHOLOGIST: CPT

## 2022-01-01 PROCEDURE — 74183 MRI ABD W/O CNTR FLWD CNTR: CPT

## 2022-01-01 PROCEDURE — 87150 DNA/RNA AMPLIFIED PROBE: CPT

## 2022-01-01 PROCEDURE — 88331 PATH CONSLTJ SURG 1 BLK 1SPC: CPT

## 2022-01-01 PROCEDURE — 74177 CT ABD & PELVIS W/CONTRAST: CPT | Mod: MA

## 2022-01-01 PROCEDURE — 84134 ASSAY OF PREALBUMIN: CPT

## 2022-01-01 PROCEDURE — 72125 CT NECK SPINE W/O DYE: CPT | Mod: 26

## 2022-01-01 PROCEDURE — 61781 SCAN PROC CRANIAL INTRA: CPT | Mod: 82

## 2022-01-01 PROCEDURE — 84300 ASSAY OF URINE SODIUM: CPT

## 2022-01-01 PROCEDURE — 88341 IMHCHEM/IMCYTCHM EA ADD ANTB: CPT | Mod: 26

## 2022-01-01 PROCEDURE — 74183 MRI ABD W/O CNTR FLWD CNTR: CPT | Mod: 26

## 2022-01-01 PROCEDURE — C1889: CPT

## 2022-01-01 PROCEDURE — 86301 IMMUNOASSAY TUMOR CA 19-9: CPT

## 2022-01-01 PROCEDURE — 80164 ASSAY DIPROPYLACETIC ACD TOT: CPT

## 2022-01-01 PROCEDURE — 82962 GLUCOSE BLOOD TEST: CPT

## 2022-01-01 PROCEDURE — 61781 SCAN PROC CRANIAL INTRA: CPT

## 2022-01-01 PROCEDURE — 88305 TISSUE EXAM BY PATHOLOGIST: CPT | Mod: 26

## 2022-01-01 PROCEDURE — 99291 CRITICAL CARE FIRST HOUR: CPT

## 2022-01-01 PROCEDURE — 88331 PATH CONSLTJ SURG 1 BLK 1SPC: CPT | Mod: 26

## 2022-01-01 PROCEDURE — 95720 EEG PHY/QHP EA INCR W/VEEG: CPT

## 2022-01-01 PROCEDURE — 83935 ASSAY OF URINE OSMOLALITY: CPT

## 2022-01-01 PROCEDURE — C1763: CPT

## 2022-01-01 PROCEDURE — 99285 EMERGENCY DEPT VISIT HI MDM: CPT

## 2022-01-01 PROCEDURE — 43239 EGD BIOPSY SINGLE/MULTIPLE: CPT | Mod: 59

## 2022-01-01 PROCEDURE — 87186 SC STD MICRODIL/AGAR DIL: CPT

## 2022-01-01 PROCEDURE — C1769: CPT

## 2022-01-01 PROCEDURE — 84443 ASSAY THYROID STIM HORMONE: CPT

## 2022-01-01 PROCEDURE — 86901 BLOOD TYPING SEROLOGIC RH(D): CPT

## 2022-01-01 PROCEDURE — 71260 CT THORAX DX C+: CPT | Mod: 26

## 2022-01-01 PROCEDURE — 92610 EVALUATE SWALLOWING FUNCTION: CPT

## 2022-01-01 PROCEDURE — 85027 COMPLETE CBC AUTOMATED: CPT

## 2022-01-01 PROCEDURE — 74018 RADEX ABDOMEN 1 VIEW: CPT | Mod: 26

## 2022-01-01 PROCEDURE — 99498 ADVNCD CARE PLAN ADDL 30 MIN: CPT | Mod: 25

## 2022-01-01 PROCEDURE — 12345: CPT | Mod: NC

## 2022-01-01 PROCEDURE — 88341 IMHCHEM/IMCYTCHM EA ADD ANTB: CPT

## 2022-01-01 PROCEDURE — 87040 BLOOD CULTURE FOR BACTERIA: CPT

## 2022-01-01 PROCEDURE — 88307 TISSUE EXAM BY PATHOLOGIST: CPT | Mod: 26

## 2022-01-01 PROCEDURE — 82533 TOTAL CORTISOL: CPT

## 2022-01-01 PROCEDURE — 82378 CARCINOEMBRYONIC ANTIGEN: CPT

## 2022-01-01 PROCEDURE — 84550 ASSAY OF BLOOD/URIC ACID: CPT

## 2022-01-01 PROCEDURE — 86850 RBC ANTIBODY SCREEN: CPT

## 2022-01-01 PROCEDURE — 86900 BLOOD TYPING SEROLOGIC ABO: CPT

## 2022-01-01 PROCEDURE — 83735 ASSAY OF MAGNESIUM: CPT

## 2022-01-01 PROCEDURE — 85610 PROTHROMBIN TIME: CPT

## 2022-01-01 PROCEDURE — 80076 HEPATIC FUNCTION PANEL: CPT

## 2022-01-01 PROCEDURE — 87070 CULTURE OTHR SPECIMN AEROBIC: CPT

## 2022-01-01 PROCEDURE — 88172 CYTP DX EVAL FNA 1ST EA SITE: CPT

## 2022-01-01 PROCEDURE — 82105 ALPHA-FETOPROTEIN SERUM: CPT

## 2022-01-01 PROCEDURE — 95700 EEG CONT REC W/VID EEG TECH: CPT

## 2022-01-01 PROCEDURE — 99284 EMERGENCY DEPT VISIT MOD MDM: CPT

## 2022-01-01 PROCEDURE — U0003: CPT

## 2022-01-01 PROCEDURE — 87077 CULTURE AEROBIC IDENTIFY: CPT

## 2022-01-01 PROCEDURE — 74176 CT ABD & PELVIS W/O CONTRAST: CPT | Mod: 26

## 2022-01-01 PROCEDURE — 88305 TISSUE EXAM BY PATHOLOGIST: CPT

## 2022-01-01 PROCEDURE — 95714 VEEG EA 12-26 HR UNMNTR: CPT

## 2022-01-01 DEVICE — SPONGE HSTAT SURGICEL 2X14": Type: IMPLANTABLE DEVICE | Status: FUNCTIONAL

## 2022-01-01 DEVICE — GRAFT DURAL MATRX 3 X 3IN DURGN: Type: IMPLANTABLE DEVICE | Status: FUNCTIONAL

## 2022-01-01 DEVICE — IMPLANTABLE DEVICE: Type: IMPLANTABLE DEVICE | Status: FUNCTIONAL

## 2022-01-01 DEVICE — DURASEAL: Type: IMPLANTABLE DEVICE | Status: FUNCTIONAL

## 2022-01-01 DEVICE — KIT A-LINE 1LUM 20GX12CM SAFE KIT: Type: IMPLANTABLE DEVICE | Status: FUNCTIONAL

## 2022-01-01 DEVICE — PLATE COVER BURRHOLE UN3 W/TAB 10MM: Type: IMPLANTABLE DEVICE | Status: FUNCTIONAL

## 2022-01-01 DEVICE — SPONGE HSTAT SURGICEL FIBRILLAR 2X4": Type: IMPLANTABLE DEVICE | Status: FUNCTIONAL

## 2022-01-01 DEVICE — SCREW UN3 SLF DRILL 1.5X4MM: Type: IMPLANTABLE DEVICE | Status: FUNCTIONAL

## 2022-01-01 DEVICE — PIN SKULL AD DISP: Type: IMPLANTABLE DEVICE | Status: FUNCTIONAL

## 2022-01-01 DEVICE — SPONGE HSTAT GELFOAM 12X7MM: Type: IMPLANTABLE DEVICE | Status: FUNCTIONAL

## 2022-01-01 DEVICE — SEALANT FLOSEAL FAST PREP HEMOSTATIC MATRIX 10ML: Type: IMPLANTABLE DEVICE | Status: FUNCTIONAL

## 2022-01-01 RX ORDER — ALPRAZOLAM 0.25 MG
0.5 TABLET ORAL ONCE
Refills: 0 | Status: DISCONTINUED | OUTPATIENT
Start: 2022-01-01 | End: 2022-01-01

## 2022-01-01 RX ORDER — LABETALOL HCL 100 MG
10 TABLET ORAL
Refills: 0 | Status: DISCONTINUED | OUTPATIENT
Start: 2022-01-01 | End: 2022-01-01

## 2022-01-01 RX ORDER — HYDROMORPHONE HYDROCHLORIDE 2 MG/ML
0.1 INJECTION INTRAMUSCULAR; INTRAVENOUS; SUBCUTANEOUS
Qty: 100 | Refills: 0 | Status: DISCONTINUED | OUTPATIENT
Start: 2022-01-01 | End: 2022-01-01

## 2022-01-01 RX ORDER — DEXAMETHASONE 0.5 MG/5ML
ELIXIR ORAL
Refills: 0 | Status: DISCONTINUED | OUTPATIENT
Start: 2022-01-01 | End: 2022-01-01

## 2022-01-01 RX ORDER — ENOXAPARIN SODIUM 100 MG/ML
40 INJECTION SUBCUTANEOUS AT BEDTIME
Refills: 0 | Status: DISCONTINUED | OUTPATIENT
Start: 2022-01-01 | End: 2022-01-01

## 2022-01-01 RX ORDER — DEXAMETHASONE 0.5 MG/5ML
3 ELIXIR ORAL EVERY 6 HOURS
Refills: 0 | Status: COMPLETED | OUTPATIENT
Start: 2022-01-01 | End: 2022-01-01

## 2022-01-01 RX ORDER — POTASSIUM PHOSPHATE, MONOBASIC POTASSIUM PHOSPHATE, DIBASIC 236; 224 MG/ML; MG/ML
30 INJECTION, SOLUTION INTRAVENOUS ONCE
Refills: 0 | Status: COMPLETED | OUTPATIENT
Start: 2022-01-01 | End: 2022-01-01

## 2022-01-01 RX ORDER — HYDROMORPHONE HYDROCHLORIDE 2 MG/ML
0.3 INJECTION INTRAMUSCULAR; INTRAVENOUS; SUBCUTANEOUS
Refills: 0 | Status: DISCONTINUED | OUTPATIENT
Start: 2022-01-01 | End: 2022-01-01

## 2022-01-01 RX ORDER — FLUCONAZOLE 150 MG/1
100 TABLET ORAL DAILY
Refills: 0 | Status: DISCONTINUED | OUTPATIENT
Start: 2022-01-01 | End: 2022-01-01

## 2022-01-01 RX ORDER — DEXTROSE 50 % IN WATER 50 %
12.5 SYRINGE (ML) INTRAVENOUS ONCE
Refills: 0 | Status: DISCONTINUED | OUTPATIENT
Start: 2022-01-01 | End: 2022-01-01

## 2022-01-01 RX ORDER — OLANZAPINE 15 MG/1
2.5 TABLET, FILM COATED ORAL EVERY 12 HOURS
Refills: 0 | Status: DISCONTINUED | OUTPATIENT
Start: 2022-01-01 | End: 2022-01-01

## 2022-01-01 RX ORDER — ACETAMINOPHEN 500 MG
650 TABLET ORAL EVERY 6 HOURS
Refills: 0 | Status: DISCONTINUED | OUTPATIENT
Start: 2022-01-01 | End: 2022-01-01

## 2022-01-01 RX ORDER — LANOLIN ALCOHOL/MO/W.PET/CERES
5 CREAM (GRAM) TOPICAL AT BEDTIME
Refills: 0 | Status: DISCONTINUED | OUTPATIENT
Start: 2022-01-01 | End: 2022-01-01

## 2022-01-01 RX ORDER — ONDANSETRON 8 MG/1
4 TABLET, FILM COATED ORAL EVERY 6 HOURS
Refills: 0 | Status: DISCONTINUED | OUTPATIENT
Start: 2022-01-01 | End: 2022-01-01

## 2022-01-01 RX ORDER — HALOPERIDOL DECANOATE 100 MG/ML
2.5 INJECTION INTRAMUSCULAR ONCE
Refills: 0 | Status: COMPLETED | OUTPATIENT
Start: 2022-01-01 | End: 2022-01-01

## 2022-01-01 RX ORDER — PIPERACILLIN AND TAZOBACTAM 4; .5 G/20ML; G/20ML
3.38 INJECTION, POWDER, LYOPHILIZED, FOR SOLUTION INTRAVENOUS EVERY 8 HOURS
Refills: 0 | Status: DISCONTINUED | OUTPATIENT
Start: 2022-01-01 | End: 2022-01-01

## 2022-01-01 RX ORDER — ACETAMINOPHEN 500 MG
1000 TABLET ORAL ONCE
Refills: 0 | Status: COMPLETED | OUTPATIENT
Start: 2022-01-01 | End: 2022-01-01

## 2022-01-01 RX ORDER — HYDROMORPHONE HYDROCHLORIDE 2 MG/ML
1 INJECTION INTRAMUSCULAR; INTRAVENOUS; SUBCUTANEOUS
Refills: 0 | Status: DISCONTINUED | OUTPATIENT
Start: 2022-01-01 | End: 2022-01-01

## 2022-01-01 RX ORDER — POLYETHYLENE GLYCOL 3350 17 G/17G
17 POWDER, FOR SOLUTION ORAL DAILY
Refills: 0 | Status: DISCONTINUED | OUTPATIENT
Start: 2022-01-01 | End: 2022-01-01

## 2022-01-01 RX ORDER — SODIUM CHLORIDE 9 MG/ML
1000 INJECTION INTRAMUSCULAR; INTRAVENOUS; SUBCUTANEOUS
Refills: 0 | Status: DISCONTINUED | OUTPATIENT
Start: 2022-01-01 | End: 2022-01-01

## 2022-01-01 RX ORDER — GLUCAGON INJECTION, SOLUTION 0.5 MG/.1ML
1 INJECTION, SOLUTION SUBCUTANEOUS ONCE
Refills: 0 | Status: DISCONTINUED | OUTPATIENT
Start: 2022-01-01 | End: 2022-01-01

## 2022-01-01 RX ORDER — ACETAMINOPHEN 500 MG
1000 TABLET ORAL ONCE
Refills: 0 | Status: DISCONTINUED | OUTPATIENT
Start: 2022-01-01 | End: 2022-01-01

## 2022-01-01 RX ORDER — HYDROMORPHONE HYDROCHLORIDE 2 MG/ML
0.1 INJECTION INTRAMUSCULAR; INTRAVENOUS; SUBCUTANEOUS
Qty: 10 | Refills: 0 | Status: DISCONTINUED | OUTPATIENT
Start: 2022-01-01 | End: 2022-01-01

## 2022-01-01 RX ORDER — DEXAMETHASONE 0.5 MG/5ML
4 ELIXIR ORAL EVERY 6 HOURS
Refills: 0 | Status: DISCONTINUED | OUTPATIENT
Start: 2022-01-01 | End: 2022-01-01

## 2022-01-01 RX ORDER — DEXTROSE 50 % IN WATER 50 %
25 SYRINGE (ML) INTRAVENOUS ONCE
Refills: 0 | Status: DISCONTINUED | OUTPATIENT
Start: 2022-01-01 | End: 2022-01-01

## 2022-01-01 RX ORDER — HYDROMORPHONE HYDROCHLORIDE 2 MG/ML
0.3 INJECTION INTRAMUSCULAR; INTRAVENOUS; SUBCUTANEOUS EVERY 4 HOURS
Refills: 0 | Status: DISCONTINUED | OUTPATIENT
Start: 2022-01-01 | End: 2022-01-01

## 2022-01-01 RX ORDER — INSULIN LISPRO 100/ML
VIAL (ML) SUBCUTANEOUS
Refills: 0 | Status: DISCONTINUED | OUTPATIENT
Start: 2022-01-01 | End: 2022-01-01

## 2022-01-01 RX ORDER — VALPROIC ACID (AS SODIUM SALT) 250 MG/5ML
500 SOLUTION, ORAL ORAL EVERY 12 HOURS
Refills: 0 | Status: DISCONTINUED | OUTPATIENT
Start: 2022-01-01 | End: 2022-01-01

## 2022-01-01 RX ORDER — MORPHINE SULFATE 50 MG/1
1 CAPSULE, EXTENDED RELEASE ORAL ONCE
Refills: 0 | Status: DISCONTINUED | OUTPATIENT
Start: 2022-01-01 | End: 2022-01-01

## 2022-01-01 RX ORDER — DOXAZOSIN MESYLATE 4 MG
2 TABLET ORAL ONCE
Refills: 0 | Status: COMPLETED | OUTPATIENT
Start: 2022-01-01 | End: 2022-01-01

## 2022-01-01 RX ORDER — HYDRALAZINE HCL 50 MG
10 TABLET ORAL
Refills: 0 | Status: DISCONTINUED | OUTPATIENT
Start: 2022-01-01 | End: 2022-01-01

## 2022-01-01 RX ORDER — HYDROMORPHONE HYDROCHLORIDE 2 MG/ML
0.5 INJECTION INTRAMUSCULAR; INTRAVENOUS; SUBCUTANEOUS ONCE
Refills: 0 | Status: DISCONTINUED | OUTPATIENT
Start: 2022-01-01 | End: 2022-01-01

## 2022-01-01 RX ORDER — AMLODIPINE BESYLATE 2.5 MG/1
5 TABLET ORAL DAILY
Refills: 0 | Status: DISCONTINUED | OUTPATIENT
Start: 2022-01-01 | End: 2022-01-01

## 2022-01-01 RX ORDER — SENNA PLUS 8.6 MG/1
2 TABLET ORAL AT BEDTIME
Refills: 0 | Status: DISCONTINUED | OUTPATIENT
Start: 2022-01-01 | End: 2022-01-01

## 2022-01-01 RX ORDER — PANTOPRAZOLE SODIUM 20 MG/1
40 TABLET, DELAYED RELEASE ORAL DAILY
Refills: 0 | Status: DISCONTINUED | OUTPATIENT
Start: 2022-01-01 | End: 2022-01-01

## 2022-01-01 RX ORDER — MORPHINE SULFATE 50 MG/1
2 CAPSULE, EXTENDED RELEASE ORAL EVERY 6 HOURS
Refills: 0 | Status: DISCONTINUED | OUTPATIENT
Start: 2022-01-01 | End: 2022-01-01

## 2022-01-01 RX ORDER — LEVETIRACETAM 250 MG/1
500 TABLET, FILM COATED ORAL EVERY 12 HOURS
Refills: 0 | Status: DISCONTINUED | OUTPATIENT
Start: 2022-01-01 | End: 2022-01-01

## 2022-01-01 RX ORDER — PIPERACILLIN AND TAZOBACTAM 4; .5 G/20ML; G/20ML
3.38 INJECTION, POWDER, LYOPHILIZED, FOR SOLUTION INTRAVENOUS ONCE
Refills: 0 | Status: COMPLETED | OUTPATIENT
Start: 2022-01-01 | End: 2022-01-01

## 2022-01-01 RX ORDER — OXYCODONE HYDROCHLORIDE 5 MG/1
5 TABLET ORAL EVERY 6 HOURS
Refills: 0 | Status: DISCONTINUED | OUTPATIENT
Start: 2022-01-01 | End: 2022-01-01

## 2022-01-01 RX ORDER — DEXAMETHASONE 0.5 MG/5ML
6 ELIXIR ORAL EVERY 6 HOURS
Refills: 0 | Status: DISCONTINUED | OUTPATIENT
Start: 2022-01-01 | End: 2022-01-01

## 2022-01-01 RX ORDER — PANTOPRAZOLE SODIUM 20 MG/1
40 TABLET, DELAYED RELEASE ORAL EVERY 12 HOURS
Refills: 0 | Status: DISCONTINUED | OUTPATIENT
Start: 2022-01-01 | End: 2022-01-01

## 2022-01-01 RX ORDER — POLYETHYLENE GLYCOL 3350 17 G/17G
17 POWDER, FOR SOLUTION ORAL
Refills: 0 | Status: DISCONTINUED | OUTPATIENT
Start: 2022-01-01 | End: 2022-01-01

## 2022-01-01 RX ORDER — SODIUM CHLORIDE 9 MG/ML
1000 INJECTION, SOLUTION INTRAVENOUS
Refills: 0 | Status: DISCONTINUED | OUTPATIENT
Start: 2022-01-01 | End: 2022-01-01

## 2022-01-01 RX ORDER — SODIUM CHLORIDE 9 MG/ML
1 INJECTION INTRAMUSCULAR; INTRAVENOUS; SUBCUTANEOUS EVERY 12 HOURS
Refills: 0 | Status: DISCONTINUED | OUTPATIENT
Start: 2022-01-01 | End: 2022-01-01

## 2022-01-01 RX ORDER — HYDROMORPHONE HYDROCHLORIDE 2 MG/ML
0.3 INJECTION INTRAMUSCULAR; INTRAVENOUS; SUBCUTANEOUS
Qty: 100 | Refills: 0 | Status: DISCONTINUED | OUTPATIENT
Start: 2022-01-01 | End: 2022-01-01

## 2022-01-01 RX ORDER — INSULIN LISPRO 100/ML
VIAL (ML) SUBCUTANEOUS EVERY 6 HOURS
Refills: 0 | Status: DISCONTINUED | OUTPATIENT
Start: 2022-01-01 | End: 2022-01-01

## 2022-01-01 RX ORDER — DEXAMETHASONE 0.5 MG/5ML
2 ELIXIR ORAL EVERY 6 HOURS
Refills: 0 | Status: DISCONTINUED | OUTPATIENT
Start: 2022-01-01 | End: 2022-01-01

## 2022-01-01 RX ORDER — DEXMEDETOMIDINE HYDROCHLORIDE IN 0.9% SODIUM CHLORIDE 4 UG/ML
0.3 INJECTION INTRAVENOUS
Qty: 200 | Refills: 0 | Status: DISCONTINUED | OUTPATIENT
Start: 2022-01-01 | End: 2022-01-01

## 2022-01-01 RX ORDER — DEXAMETHASONE 0.5 MG/5ML
10 ELIXIR ORAL ONCE
Refills: 0 | Status: COMPLETED | OUTPATIENT
Start: 2022-01-01 | End: 2022-01-01

## 2022-01-01 RX ORDER — NICARDIPINE HYDROCHLORIDE 30 MG/1
5 CAPSULE, EXTENDED RELEASE ORAL
Qty: 40 | Refills: 0 | Status: DISCONTINUED | OUTPATIENT
Start: 2022-01-01 | End: 2022-01-01

## 2022-01-01 RX ORDER — HALOPERIDOL DECANOATE 100 MG/ML
1 INJECTION INTRAMUSCULAR EVERY 4 HOURS
Refills: 0 | Status: DISCONTINUED | OUTPATIENT
Start: 2022-01-01 | End: 2022-01-01

## 2022-01-01 RX ORDER — DEXTROSE 50 % IN WATER 50 %
15 SYRINGE (ML) INTRAVENOUS ONCE
Refills: 0 | Status: DISCONTINUED | OUTPATIENT
Start: 2022-01-01 | End: 2022-01-01

## 2022-01-01 RX ORDER — DOXAZOSIN MESYLATE 4 MG
4 TABLET ORAL AT BEDTIME
Refills: 0 | Status: DISCONTINUED | OUTPATIENT
Start: 2022-01-01 | End: 2022-01-01

## 2022-01-01 RX ORDER — DIAZEPAM 5 MG
1 TABLET ORAL ONCE
Refills: 0 | Status: DISCONTINUED | OUTPATIENT
Start: 2022-01-01 | End: 2022-01-01

## 2022-01-01 RX ORDER — METOPROLOL TARTRATE 50 MG
25 TABLET ORAL
Refills: 0 | Status: DISCONTINUED | OUTPATIENT
Start: 2022-01-01 | End: 2022-01-01

## 2022-01-01 RX ORDER — FLUCONAZOLE 150 MG/1
100 TABLET ORAL EVERY 24 HOURS
Refills: 0 | Status: DISCONTINUED | OUTPATIENT
Start: 2022-01-01 | End: 2022-01-01

## 2022-01-01 RX ORDER — SODIUM CHLORIDE 9 MG/ML
1 INJECTION INTRAMUSCULAR; INTRAVENOUS; SUBCUTANEOUS THREE TIMES A DAY
Refills: 0 | Status: DISCONTINUED | OUTPATIENT
Start: 2022-01-01 | End: 2022-01-01

## 2022-01-01 RX ORDER — POTASSIUM CHLORIDE 20 MEQ
10 PACKET (EA) ORAL
Refills: 0 | Status: COMPLETED | OUTPATIENT
Start: 2022-01-01 | End: 2022-01-01

## 2022-01-01 RX ORDER — DEXMEDETOMIDINE HYDROCHLORIDE IN 0.9% SODIUM CHLORIDE 4 UG/ML
0.1 INJECTION INTRAVENOUS
Qty: 200 | Refills: 0 | Status: DISCONTINUED | OUTPATIENT
Start: 2022-01-01 | End: 2022-01-01

## 2022-01-01 RX ORDER — CEFAZOLIN SODIUM 1 G
2000 VIAL (EA) INJECTION ONCE
Refills: 0 | Status: DISCONTINUED | OUTPATIENT
Start: 2022-01-01 | End: 2022-01-01

## 2022-01-01 RX ORDER — LABETALOL HCL 100 MG
10 TABLET ORAL ONCE
Refills: 0 | Status: COMPLETED | OUTPATIENT
Start: 2022-01-01 | End: 2022-01-01

## 2022-01-01 RX ORDER — LEVETIRACETAM 250 MG/1
2000 TABLET, FILM COATED ORAL ONCE
Refills: 0 | Status: DISCONTINUED | OUTPATIENT
Start: 2022-01-01 | End: 2022-01-01

## 2022-01-01 RX ORDER — VALPROIC ACID (AS SODIUM SALT) 250 MG/5ML
250 SOLUTION, ORAL ORAL
Refills: 0 | Status: DISCONTINUED | OUTPATIENT
Start: 2022-01-01 | End: 2022-01-01

## 2022-01-01 RX ORDER — FLUCONAZOLE 150 MG/1
200 TABLET ORAL ONCE
Refills: 0 | Status: COMPLETED | OUTPATIENT
Start: 2022-01-01 | End: 2022-01-01

## 2022-01-01 RX ORDER — LEVETIRACETAM 250 MG/1
2000 TABLET, FILM COATED ORAL ONCE
Refills: 0 | Status: COMPLETED | OUTPATIENT
Start: 2022-01-01 | End: 2022-01-01

## 2022-01-01 RX ORDER — ASCORBIC ACID 60 MG
500 TABLET,CHEWABLE ORAL DAILY
Refills: 0 | Status: DISCONTINUED | OUTPATIENT
Start: 2022-01-01 | End: 2022-01-01

## 2022-01-01 RX ORDER — OXYCODONE HYDROCHLORIDE 5 MG/1
10 TABLET ORAL EVERY 6 HOURS
Refills: 0 | Status: DISCONTINUED | OUTPATIENT
Start: 2022-01-01 | End: 2022-01-01

## 2022-01-01 RX ORDER — CEFTRIAXONE 500 MG/1
1000 INJECTION, POWDER, FOR SOLUTION INTRAMUSCULAR; INTRAVENOUS EVERY 24 HOURS
Refills: 0 | Status: COMPLETED | OUTPATIENT
Start: 2022-01-01 | End: 2022-01-01

## 2022-01-01 RX ORDER — HYDROMORPHONE HYDROCHLORIDE 2 MG/ML
1 INJECTION INTRAMUSCULAR; INTRAVENOUS; SUBCUTANEOUS
Qty: 100 | Refills: 0 | Status: DISCONTINUED | OUTPATIENT
Start: 2022-01-01 | End: 2022-01-01

## 2022-01-01 RX ORDER — POTASSIUM CHLORIDE 20 MEQ
40 PACKET (EA) ORAL ONCE
Refills: 0 | Status: DISCONTINUED | OUTPATIENT
Start: 2022-01-01 | End: 2022-01-01

## 2022-01-01 RX ORDER — DIAZEPAM 5 MG
2 TABLET ORAL ONCE
Refills: 0 | Status: DISCONTINUED | OUTPATIENT
Start: 2022-01-01 | End: 2022-01-01

## 2022-01-01 RX ORDER — SODIUM CHLORIDE 5 G/100ML
1000 INJECTION, SOLUTION INTRAVENOUS
Refills: 0 | Status: DISCONTINUED | OUTPATIENT
Start: 2022-01-01 | End: 2022-01-01

## 2022-01-01 RX ORDER — OXYCODONE HYDROCHLORIDE 5 MG/1
5 TABLET ORAL EVERY 4 HOURS
Refills: 0 | Status: DISCONTINUED | OUTPATIENT
Start: 2022-01-01 | End: 2022-01-01

## 2022-01-01 RX ORDER — OXYCODONE HYDROCHLORIDE 5 MG/1
5 TABLET ORAL ONCE
Refills: 0 | Status: DISCONTINUED | OUTPATIENT
Start: 2022-01-01 | End: 2022-01-01

## 2022-01-01 RX ORDER — PIPERACILLIN AND TAZOBACTAM 4; .5 G/20ML; G/20ML
INJECTION, POWDER, LYOPHILIZED, FOR SOLUTION INTRAVENOUS
Refills: 0 | Status: COMPLETED | OUTPATIENT
Start: 2022-01-01 | End: 2022-01-01

## 2022-01-01 RX ORDER — DOXAZOSIN MESYLATE 4 MG
2 TABLET ORAL DAILY
Refills: 0 | Status: DISCONTINUED | OUTPATIENT
Start: 2022-01-01 | End: 2022-01-01

## 2022-01-01 RX ORDER — DEXAMETHASONE 0.5 MG/5ML
3 ELIXIR ORAL EVERY 6 HOURS
Refills: 0 | Status: DISCONTINUED | OUTPATIENT
Start: 2022-01-01 | End: 2022-01-01

## 2022-01-01 RX ORDER — LEVETIRACETAM 250 MG/1
1000 TABLET, FILM COATED ORAL ONCE
Refills: 0 | Status: DISCONTINUED | OUTPATIENT
Start: 2022-01-01 | End: 2022-01-01

## 2022-01-01 RX ADMIN — Medication 27.5 MILLIGRAM(S): at 10:25

## 2022-01-01 RX ADMIN — Medication 400 MILLIGRAM(S): at 22:30

## 2022-01-01 RX ADMIN — Medication 2 MILLIGRAM(S): at 17:42

## 2022-01-01 RX ADMIN — PANTOPRAZOLE SODIUM 40 MILLIGRAM(S): 20 TABLET, DELAYED RELEASE ORAL at 12:54

## 2022-01-01 RX ADMIN — Medication 3 MILLIGRAM(S): at 17:33

## 2022-01-01 RX ADMIN — LEVETIRACETAM 420 MILLIGRAM(S): 250 TABLET, FILM COATED ORAL at 05:08

## 2022-01-01 RX ADMIN — Medication 10 MILLIGRAM(S): at 15:24

## 2022-01-01 RX ADMIN — POTASSIUM PHOSPHATE, MONOBASIC POTASSIUM PHOSPHATE, DIBASIC 83.33 MILLIMOLE(S): 236; 224 INJECTION, SOLUTION INTRAVENOUS at 06:16

## 2022-01-01 RX ADMIN — Medication 4: at 21:18

## 2022-01-01 RX ADMIN — ENOXAPARIN SODIUM 40 MILLIGRAM(S): 100 INJECTION SUBCUTANEOUS at 23:01

## 2022-01-01 RX ADMIN — Medication 10 MILLIGRAM(S): at 05:33

## 2022-01-01 RX ADMIN — HYDROMORPHONE HYDROCHLORIDE 0.3 MILLIGRAM(S): 2 INJECTION INTRAMUSCULAR; INTRAVENOUS; SUBCUTANEOUS at 07:30

## 2022-01-01 RX ADMIN — ENOXAPARIN SODIUM 40 MILLIGRAM(S): 100 INJECTION SUBCUTANEOUS at 21:28

## 2022-01-01 RX ADMIN — Medication 250 MILLIGRAM(S): at 17:33

## 2022-01-01 RX ADMIN — HYDROMORPHONE HYDROCHLORIDE 0.3 MILLIGRAM(S): 2 INJECTION INTRAMUSCULAR; INTRAVENOUS; SUBCUTANEOUS at 02:29

## 2022-01-01 RX ADMIN — LEVETIRACETAM 500 MILLIGRAM(S): 250 TABLET, FILM COATED ORAL at 04:49

## 2022-01-01 RX ADMIN — Medication 650 MILLIGRAM(S): at 22:56

## 2022-01-01 RX ADMIN — Medication 3 MILLIGRAM(S): at 11:16

## 2022-01-01 RX ADMIN — Medication 650 MILLIGRAM(S): at 21:00

## 2022-01-01 RX ADMIN — Medication 1000 MILLIGRAM(S): at 21:30

## 2022-01-01 RX ADMIN — HYDROMORPHONE HYDROCHLORIDE 0.3 MILLIGRAM(S): 2 INJECTION INTRAMUSCULAR; INTRAVENOUS; SUBCUTANEOUS at 03:26

## 2022-01-01 RX ADMIN — LEVETIRACETAM 500 MILLIGRAM(S): 250 TABLET, FILM COATED ORAL at 05:01

## 2022-01-01 RX ADMIN — Medication 3 MILLIGRAM(S): at 17:18

## 2022-01-01 RX ADMIN — HYDROMORPHONE HYDROCHLORIDE 0.3 MILLIGRAM(S): 2 INJECTION INTRAMUSCULAR; INTRAVENOUS; SUBCUTANEOUS at 22:21

## 2022-01-01 RX ADMIN — Medication 2 MILLIGRAM(S): at 15:51

## 2022-01-01 RX ADMIN — Medication 4 MILLIGRAM(S): at 08:14

## 2022-01-01 RX ADMIN — Medication 500 MILLIGRAM(S): at 22:41

## 2022-01-01 RX ADMIN — Medication 3 MILLIGRAM(S): at 22:57

## 2022-01-01 RX ADMIN — PANTOPRAZOLE SODIUM 40 MILLIGRAM(S): 20 TABLET, DELAYED RELEASE ORAL at 09:49

## 2022-01-01 RX ADMIN — AMLODIPINE BESYLATE 5 MILLIGRAM(S): 2.5 TABLET ORAL at 12:53

## 2022-01-01 RX ADMIN — SODIUM CHLORIDE 1 GRAM(S): 9 INJECTION INTRAMUSCULAR; INTRAVENOUS; SUBCUTANEOUS at 05:36

## 2022-01-01 RX ADMIN — Medication 250 MILLIGRAM(S): at 05:36

## 2022-01-01 RX ADMIN — Medication 10 MILLIGRAM(S): at 08:25

## 2022-01-01 RX ADMIN — Medication 650 MILLIGRAM(S): at 04:31

## 2022-01-01 RX ADMIN — Medication 6 MILLIGRAM(S): at 05:40

## 2022-01-01 RX ADMIN — OXYCODONE HYDROCHLORIDE 5 MILLIGRAM(S): 5 TABLET ORAL at 00:44

## 2022-01-01 RX ADMIN — Medication 6 MILLIGRAM(S): at 18:10

## 2022-01-01 RX ADMIN — Medication 4 MILLIGRAM(S): at 22:07

## 2022-01-01 RX ADMIN — AMLODIPINE BESYLATE 5 MILLIGRAM(S): 2.5 TABLET ORAL at 06:15

## 2022-01-01 RX ADMIN — Medication 650 MILLIGRAM(S): at 05:50

## 2022-01-01 RX ADMIN — Medication 250 MILLIGRAM(S): at 17:39

## 2022-01-01 RX ADMIN — Medication 2 MILLIGRAM(S): at 17:28

## 2022-01-01 RX ADMIN — Medication 4 MILLIGRAM(S): at 00:35

## 2022-01-01 RX ADMIN — Medication 1000 MILLIGRAM(S): at 23:00

## 2022-01-01 RX ADMIN — SODIUM CHLORIDE 1 GRAM(S): 9 INJECTION INTRAMUSCULAR; INTRAVENOUS; SUBCUTANEOUS at 13:05

## 2022-01-01 RX ADMIN — POLYETHYLENE GLYCOL 3350 17 GRAM(S): 17 POWDER, FOR SOLUTION ORAL at 17:53

## 2022-01-01 RX ADMIN — Medication 10 MILLIGRAM(S): at 23:20

## 2022-01-01 RX ADMIN — Medication 250 MILLIGRAM(S): at 05:47

## 2022-01-01 RX ADMIN — Medication 4 MILLIGRAM(S): at 21:28

## 2022-01-01 RX ADMIN — Medication 10 MILLIGRAM(S): at 18:14

## 2022-01-01 RX ADMIN — Medication 10 MILLIGRAM(S): at 01:23

## 2022-01-01 RX ADMIN — OXYCODONE HYDROCHLORIDE 5 MILLIGRAM(S): 5 TABLET ORAL at 18:31

## 2022-01-01 RX ADMIN — Medication 27.5 MILLIGRAM(S): at 10:14

## 2022-01-01 RX ADMIN — OXYCODONE HYDROCHLORIDE 5 MILLIGRAM(S): 5 TABLET ORAL at 16:50

## 2022-01-01 RX ADMIN — ENOXAPARIN SODIUM 40 MILLIGRAM(S): 100 INJECTION SUBCUTANEOUS at 22:57

## 2022-01-01 RX ADMIN — Medication 27.5 MILLIGRAM(S): at 22:30

## 2022-01-01 RX ADMIN — Medication 250 MILLIGRAM(S): at 05:32

## 2022-01-01 RX ADMIN — Medication 2: at 11:59

## 2022-01-01 RX ADMIN — Medication 250 MILLIGRAM(S): at 17:19

## 2022-01-01 RX ADMIN — Medication 27.5 MILLIGRAM(S): at 22:35

## 2022-01-01 RX ADMIN — Medication 3 MILLIGRAM(S): at 05:19

## 2022-01-01 RX ADMIN — Medication 10 MILLIGRAM(S): at 14:49

## 2022-01-01 RX ADMIN — Medication 100 MILLIEQUIVALENT(S): at 06:30

## 2022-01-01 RX ADMIN — Medication 250 MILLIGRAM(S): at 17:28

## 2022-01-01 RX ADMIN — AMLODIPINE BESYLATE 5 MILLIGRAM(S): 2.5 TABLET ORAL at 05:47

## 2022-01-01 RX ADMIN — FLUCONAZOLE 200 MILLIGRAM(S): 150 TABLET ORAL at 12:34

## 2022-01-01 RX ADMIN — MORPHINE SULFATE 1 MILLIGRAM(S): 50 CAPSULE, EXTENDED RELEASE ORAL at 05:30

## 2022-01-01 RX ADMIN — OXYCODONE HYDROCHLORIDE 5 MILLIGRAM(S): 5 TABLET ORAL at 18:09

## 2022-01-01 RX ADMIN — Medication 25 MILLIGRAM(S): at 17:53

## 2022-01-01 RX ADMIN — Medication 2 MILLIGRAM(S): at 17:19

## 2022-01-01 RX ADMIN — Medication 650 MILLIGRAM(S): at 20:37

## 2022-01-01 RX ADMIN — ENOXAPARIN SODIUM 40 MILLIGRAM(S): 100 INJECTION SUBCUTANEOUS at 20:34

## 2022-01-01 RX ADMIN — Medication 4 MILLIGRAM(S): at 14:09

## 2022-01-01 RX ADMIN — OXYCODONE HYDROCHLORIDE 10 MILLIGRAM(S): 5 TABLET ORAL at 23:30

## 2022-01-01 RX ADMIN — ENOXAPARIN SODIUM 40 MILLIGRAM(S): 100 INJECTION SUBCUTANEOUS at 22:37

## 2022-01-01 RX ADMIN — Medication 3 MILLIGRAM(S): at 12:05

## 2022-01-01 RX ADMIN — Medication 1000 MILLIGRAM(S): at 12:42

## 2022-01-01 RX ADMIN — Medication 5 MILLIGRAM(S): at 21:29

## 2022-01-01 RX ADMIN — SODIUM CHLORIDE 60 MILLILITER(S): 9 INJECTION INTRAMUSCULAR; INTRAVENOUS; SUBCUTANEOUS at 09:26

## 2022-01-01 RX ADMIN — Medication 2: at 11:26

## 2022-01-01 RX ADMIN — Medication 2 MILLIGRAM(S): at 13:05

## 2022-01-01 RX ADMIN — Medication 3 MILLIGRAM(S): at 00:21

## 2022-01-01 RX ADMIN — SODIUM CHLORIDE 50 MILLILITER(S): 5 INJECTION, SOLUTION INTRAVENOUS at 23:48

## 2022-01-01 RX ADMIN — Medication 4 MILLIGRAM(S): at 14:31

## 2022-01-01 RX ADMIN — SODIUM CHLORIDE 1 GRAM(S): 9 INJECTION INTRAMUSCULAR; INTRAVENOUS; SUBCUTANEOUS at 05:23

## 2022-01-01 RX ADMIN — Medication 27.5 MILLIGRAM(S): at 10:38

## 2022-01-01 RX ADMIN — SODIUM CHLORIDE 100 MILLILITER(S): 9 INJECTION INTRAMUSCULAR; INTRAVENOUS; SUBCUTANEOUS at 14:15

## 2022-01-01 RX ADMIN — Medication 10 MILLIGRAM(S): at 12:59

## 2022-01-01 RX ADMIN — ENOXAPARIN SODIUM 40 MILLIGRAM(S): 100 INJECTION SUBCUTANEOUS at 22:42

## 2022-01-01 RX ADMIN — Medication 3 MILLIGRAM(S): at 12:00

## 2022-01-01 RX ADMIN — Medication 650 MILLIGRAM(S): at 21:50

## 2022-01-01 RX ADMIN — Medication 500 MILLIGRAM(S): at 14:14

## 2022-01-01 RX ADMIN — Medication 1 TABLET(S): at 11:30

## 2022-01-01 RX ADMIN — Medication 5 MILLIGRAM(S): at 23:08

## 2022-01-01 RX ADMIN — SODIUM CHLORIDE 1 GRAM(S): 9 INJECTION INTRAMUSCULAR; INTRAVENOUS; SUBCUTANEOUS at 17:39

## 2022-01-01 RX ADMIN — Medication 3 MILLIGRAM(S): at 05:38

## 2022-01-01 RX ADMIN — Medication 0.5 MILLIGRAM(S): at 18:05

## 2022-01-01 RX ADMIN — SODIUM CHLORIDE 1 GRAM(S): 9 INJECTION INTRAMUSCULAR; INTRAVENOUS; SUBCUTANEOUS at 05:24

## 2022-01-01 RX ADMIN — SODIUM CHLORIDE 1 GRAM(S): 9 INJECTION INTRAMUSCULAR; INTRAVENOUS; SUBCUTANEOUS at 17:53

## 2022-01-01 RX ADMIN — Medication 2: at 16:21

## 2022-01-01 RX ADMIN — Medication 1 MILLIGRAM(S): at 21:43

## 2022-01-01 RX ADMIN — Medication 1 TABLET(S): at 13:19

## 2022-01-01 RX ADMIN — HYDROMORPHONE HYDROCHLORIDE 0.3 MILLIGRAM(S): 2 INJECTION INTRAMUSCULAR; INTRAVENOUS; SUBCUTANEOUS at 07:45

## 2022-01-01 RX ADMIN — Medication 3 MILLIGRAM(S): at 11:27

## 2022-01-01 RX ADMIN — Medication 5 MILLIGRAM(S): at 22:05

## 2022-01-01 RX ADMIN — Medication 10 MILLIGRAM(S): at 06:19

## 2022-01-01 RX ADMIN — Medication 650 MILLIGRAM(S): at 21:22

## 2022-01-01 RX ADMIN — Medication 2: at 12:59

## 2022-01-01 RX ADMIN — Medication 2 MILLIGRAM(S): at 00:01

## 2022-01-01 RX ADMIN — DEXMEDETOMIDINE HYDROCHLORIDE IN 0.9% SODIUM CHLORIDE 1.57 MICROGRAM(S)/KG/HR: 4 INJECTION INTRAVENOUS at 23:12

## 2022-01-01 RX ADMIN — Medication 10 MILLIGRAM(S): at 13:18

## 2022-01-01 RX ADMIN — SODIUM CHLORIDE 60 MILLILITER(S): 9 INJECTION INTRAMUSCULAR; INTRAVENOUS; SUBCUTANEOUS at 01:21

## 2022-01-01 RX ADMIN — Medication 10 MILLIGRAM(S): at 01:31

## 2022-01-01 RX ADMIN — Medication 1000 MILLIGRAM(S): at 18:55

## 2022-01-01 RX ADMIN — HYDROMORPHONE HYDROCHLORIDE 1 MG/HR: 2 INJECTION INTRAMUSCULAR; INTRAVENOUS; SUBCUTANEOUS at 18:47

## 2022-01-01 RX ADMIN — POLYETHYLENE GLYCOL 3350 17 GRAM(S): 17 POWDER, FOR SOLUTION ORAL at 13:05

## 2022-01-01 RX ADMIN — Medication 4 MILLIGRAM(S): at 20:46

## 2022-01-01 RX ADMIN — Medication 250 MILLIGRAM(S): at 17:51

## 2022-01-01 RX ADMIN — Medication 3 MILLIGRAM(S): at 23:25

## 2022-01-01 RX ADMIN — HALOPERIDOL DECANOATE 2.5 MILLIGRAM(S): 100 INJECTION INTRAMUSCULAR at 10:34

## 2022-01-01 RX ADMIN — Medication 4: at 11:37

## 2022-01-01 RX ADMIN — Medication 10 MILLIGRAM(S): at 22:13

## 2022-01-01 RX ADMIN — Medication 650 MILLIGRAM(S): at 01:30

## 2022-01-01 RX ADMIN — PANTOPRAZOLE SODIUM 40 MILLIGRAM(S): 20 TABLET, DELAYED RELEASE ORAL at 14:28

## 2022-01-01 RX ADMIN — Medication 650 MILLIGRAM(S): at 19:02

## 2022-01-01 RX ADMIN — AMLODIPINE BESYLATE 5 MILLIGRAM(S): 2.5 TABLET ORAL at 05:24

## 2022-01-01 RX ADMIN — AMLODIPINE BESYLATE 5 MILLIGRAM(S): 2.5 TABLET ORAL at 05:38

## 2022-01-01 RX ADMIN — CEFTRIAXONE 100 MILLIGRAM(S): 500 INJECTION, POWDER, FOR SOLUTION INTRAMUSCULAR; INTRAVENOUS at 21:28

## 2022-01-01 RX ADMIN — OXYCODONE HYDROCHLORIDE 5 MILLIGRAM(S): 5 TABLET ORAL at 12:48

## 2022-01-01 RX ADMIN — ENOXAPARIN SODIUM 40 MILLIGRAM(S): 100 INJECTION SUBCUTANEOUS at 22:29

## 2022-01-01 RX ADMIN — Medication 2 MILLIGRAM(S): at 05:34

## 2022-01-01 RX ADMIN — Medication 0.5 MILLIGRAM(S): at 04:49

## 2022-01-01 RX ADMIN — SODIUM CHLORIDE 1 GRAM(S): 9 INJECTION INTRAMUSCULAR; INTRAVENOUS; SUBCUTANEOUS at 21:22

## 2022-01-01 RX ADMIN — Medication 2 MILLIGRAM(S): at 00:03

## 2022-01-01 RX ADMIN — PANTOPRAZOLE SODIUM 40 MILLIGRAM(S): 20 TABLET, DELAYED RELEASE ORAL at 09:36

## 2022-01-01 RX ADMIN — OXYCODONE HYDROCHLORIDE 5 MILLIGRAM(S): 5 TABLET ORAL at 00:14

## 2022-01-01 RX ADMIN — ENOXAPARIN SODIUM 40 MILLIGRAM(S): 100 INJECTION SUBCUTANEOUS at 22:05

## 2022-01-01 RX ADMIN — PANTOPRAZOLE SODIUM 40 MILLIGRAM(S): 20 TABLET, DELAYED RELEASE ORAL at 12:30

## 2022-01-01 RX ADMIN — Medication 1 TABLET(S): at 12:46

## 2022-01-01 RX ADMIN — Medication 6 MILLIGRAM(S): at 12:18

## 2022-01-01 RX ADMIN — Medication 4 MILLIGRAM(S): at 21:22

## 2022-01-01 RX ADMIN — Medication 3 MILLIGRAM(S): at 21:28

## 2022-01-01 RX ADMIN — Medication 4 MILLIGRAM(S): at 23:06

## 2022-01-01 RX ADMIN — HYDROMORPHONE HYDROCHLORIDE 0.3 MILLIGRAM(S): 2 INJECTION INTRAMUSCULAR; INTRAVENOUS; SUBCUTANEOUS at 13:40

## 2022-01-01 RX ADMIN — PANTOPRAZOLE SODIUM 40 MILLIGRAM(S): 20 TABLET, DELAYED RELEASE ORAL at 11:26

## 2022-01-01 RX ADMIN — Medication 10 MILLIGRAM(S): at 22:54

## 2022-01-01 RX ADMIN — Medication 6 MILLIGRAM(S): at 23:53

## 2022-01-01 RX ADMIN — Medication 10 MILLIGRAM(S): at 17:21

## 2022-01-01 RX ADMIN — Medication 1 MILLIGRAM(S): at 15:27

## 2022-01-01 RX ADMIN — HYDROMORPHONE HYDROCHLORIDE 0.3 MILLIGRAM(S): 2 INJECTION INTRAMUSCULAR; INTRAVENOUS; SUBCUTANEOUS at 07:26

## 2022-01-01 RX ADMIN — Medication 1 TABLET(S): at 11:12

## 2022-01-01 RX ADMIN — Medication 650 MILLIGRAM(S): at 00:24

## 2022-01-01 RX ADMIN — Medication 10 MILLIGRAM(S): at 06:50

## 2022-01-01 RX ADMIN — Medication 650 MILLIGRAM(S): at 18:32

## 2022-01-01 RX ADMIN — HYDROMORPHONE HYDROCHLORIDE 0.3 MILLIGRAM(S): 2 INJECTION INTRAMUSCULAR; INTRAVENOUS; SUBCUTANEOUS at 13:39

## 2022-01-01 RX ADMIN — Medication 3 MILLIGRAM(S): at 23:17

## 2022-01-01 RX ADMIN — Medication 25 MILLIGRAM(S): at 17:19

## 2022-01-01 RX ADMIN — LEVETIRACETAM 420 MILLIGRAM(S): 250 TABLET, FILM COATED ORAL at 17:48

## 2022-01-01 RX ADMIN — HYDROMORPHONE HYDROCHLORIDE 0.5 MILLIGRAM(S): 2 INJECTION INTRAMUSCULAR; INTRAVENOUS; SUBCUTANEOUS at 02:09

## 2022-01-01 RX ADMIN — ENOXAPARIN SODIUM 40 MILLIGRAM(S): 100 INJECTION SUBCUTANEOUS at 20:37

## 2022-01-01 RX ADMIN — HYDROMORPHONE HYDROCHLORIDE 0.5 MILLIGRAM(S): 2 INJECTION INTRAMUSCULAR; INTRAVENOUS; SUBCUTANEOUS at 02:15

## 2022-01-01 RX ADMIN — Medication 25 MILLIGRAM(S): at 05:35

## 2022-01-01 RX ADMIN — LEVETIRACETAM 500 MILLIGRAM(S): 250 TABLET, FILM COATED ORAL at 05:06

## 2022-01-01 RX ADMIN — HALOPERIDOL DECANOATE 2.5 MILLIGRAM(S): 100 INJECTION INTRAMUSCULAR at 17:11

## 2022-01-01 RX ADMIN — Medication 100 MILLIEQUIVALENT(S): at 08:20

## 2022-01-01 RX ADMIN — Medication 1000 MILLIGRAM(S): at 21:15

## 2022-01-01 RX ADMIN — LEVETIRACETAM 500 MILLIGRAM(S): 250 TABLET, FILM COATED ORAL at 17:48

## 2022-01-01 RX ADMIN — Medication 3 MILLIGRAM(S): at 17:12

## 2022-01-01 RX ADMIN — PANTOPRAZOLE SODIUM 40 MILLIGRAM(S): 20 TABLET, DELAYED RELEASE ORAL at 11:04

## 2022-01-01 RX ADMIN — HYDROMORPHONE HYDROCHLORIDE 0.1 MG/HR: 2 INJECTION INTRAMUSCULAR; INTRAVENOUS; SUBCUTANEOUS at 13:27

## 2022-01-01 RX ADMIN — Medication 250 MILLIGRAM(S): at 05:24

## 2022-01-01 RX ADMIN — Medication 10 MILLIGRAM(S): at 17:48

## 2022-01-01 RX ADMIN — Medication 2 MILLIGRAM(S): at 23:06

## 2022-01-01 RX ADMIN — HYDROMORPHONE HYDROCHLORIDE 0.1 MG/HR: 2 INJECTION INTRAMUSCULAR; INTRAVENOUS; SUBCUTANEOUS at 07:30

## 2022-01-01 RX ADMIN — SODIUM CHLORIDE 100 MILLILITER(S): 9 INJECTION INTRAMUSCULAR; INTRAVENOUS; SUBCUTANEOUS at 18:56

## 2022-01-01 RX ADMIN — SODIUM CHLORIDE 1 GRAM(S): 9 INJECTION INTRAMUSCULAR; INTRAVENOUS; SUBCUTANEOUS at 17:28

## 2022-01-01 RX ADMIN — Medication 650 MILLIGRAM(S): at 06:00

## 2022-01-01 RX ADMIN — PIPERACILLIN AND TAZOBACTAM 200 GRAM(S): 4; .5 INJECTION, POWDER, LYOPHILIZED, FOR SOLUTION INTRAVENOUS at 01:46

## 2022-01-01 RX ADMIN — Medication 3 MILLIGRAM(S): at 05:37

## 2022-01-01 RX ADMIN — Medication 10 MILLIGRAM(S): at 22:37

## 2022-01-01 RX ADMIN — LEVETIRACETAM 500 MILLIGRAM(S): 250 TABLET, FILM COATED ORAL at 17:21

## 2022-01-01 RX ADMIN — Medication 3 MILLIGRAM(S): at 05:48

## 2022-01-01 RX ADMIN — Medication 250 MILLIGRAM(S): at 17:42

## 2022-01-01 RX ADMIN — Medication 2 MILLIGRAM(S): at 18:39

## 2022-01-01 RX ADMIN — Medication 250 MILLIGRAM(S): at 17:18

## 2022-01-01 RX ADMIN — Medication 10 MILLIGRAM(S): at 10:06

## 2022-01-01 RX ADMIN — Medication 2 MILLIGRAM(S): at 05:35

## 2022-01-01 RX ADMIN — LEVETIRACETAM 500 MILLIGRAM(S): 250 TABLET, FILM COATED ORAL at 18:07

## 2022-01-01 RX ADMIN — OXYCODONE HYDROCHLORIDE 10 MILLIGRAM(S): 5 TABLET ORAL at 22:30

## 2022-01-01 RX ADMIN — CEFTRIAXONE 100 MILLIGRAM(S): 500 INJECTION, POWDER, FOR SOLUTION INTRAMUSCULAR; INTRAVENOUS at 23:09

## 2022-01-01 RX ADMIN — Medication 2 MILLIGRAM(S): at 05:23

## 2022-01-01 RX ADMIN — Medication 250 MILLIGRAM(S): at 05:23

## 2022-01-01 RX ADMIN — Medication 102 MILLIGRAM(S): at 15:40

## 2022-01-01 RX ADMIN — Medication 10 MILLIGRAM(S): at 06:25

## 2022-01-01 RX ADMIN — PANTOPRAZOLE SODIUM 40 MILLIGRAM(S): 20 TABLET, DELAYED RELEASE ORAL at 17:48

## 2022-01-01 RX ADMIN — OXYCODONE HYDROCHLORIDE 5 MILLIGRAM(S): 5 TABLET ORAL at 20:27

## 2022-01-01 RX ADMIN — Medication 27.5 MILLIGRAM(S): at 21:18

## 2022-01-01 RX ADMIN — Medication 3 MILLIGRAM(S): at 17:02

## 2022-01-01 RX ADMIN — LEVETIRACETAM 500 MILLIGRAM(S): 250 TABLET, FILM COATED ORAL at 06:41

## 2022-01-01 RX ADMIN — Medication 500 MILLIGRAM(S): at 11:30

## 2022-01-01 RX ADMIN — Medication 27.5 MILLIGRAM(S): at 09:54

## 2022-01-01 RX ADMIN — Medication 2: at 11:04

## 2022-01-01 RX ADMIN — Medication 3 MILLIGRAM(S): at 05:24

## 2022-01-01 RX ADMIN — OXYCODONE HYDROCHLORIDE 5 MILLIGRAM(S): 5 TABLET ORAL at 18:42

## 2022-01-01 RX ADMIN — Medication 4 MILLIGRAM(S): at 01:26

## 2022-01-01 RX ADMIN — Medication 2 MILLIGRAM(S): at 11:37

## 2022-01-01 RX ADMIN — Medication 400 MILLIGRAM(S): at 20:32

## 2022-01-01 RX ADMIN — HYDROMORPHONE HYDROCHLORIDE 0.3 MILLIGRAM(S): 2 INJECTION INTRAMUSCULAR; INTRAVENOUS; SUBCUTANEOUS at 17:42

## 2022-01-01 RX ADMIN — LEVETIRACETAM 420 MILLIGRAM(S): 250 TABLET, FILM COATED ORAL at 18:13

## 2022-01-01 RX ADMIN — ENOXAPARIN SODIUM 40 MILLIGRAM(S): 100 INJECTION SUBCUTANEOUS at 21:12

## 2022-01-01 RX ADMIN — Medication 650 MILLIGRAM(S): at 23:58

## 2022-01-01 RX ADMIN — Medication 10 MILLIGRAM(S): at 23:21

## 2022-01-01 RX ADMIN — Medication 5 MILLIGRAM(S): at 22:56

## 2022-01-01 RX ADMIN — Medication 650 MILLIGRAM(S): at 00:58

## 2022-01-01 RX ADMIN — Medication 2 MILLIGRAM(S): at 06:15

## 2022-01-01 RX ADMIN — Medication 10 MILLIGRAM(S): at 07:45

## 2022-01-01 RX ADMIN — Medication 4 MILLIGRAM(S): at 09:06

## 2022-01-01 RX ADMIN — Medication 4 MILLIGRAM(S): at 22:36

## 2022-01-01 RX ADMIN — PANTOPRAZOLE SODIUM 40 MILLIGRAM(S): 20 TABLET, DELAYED RELEASE ORAL at 12:04

## 2022-01-01 RX ADMIN — Medication 4 MILLIGRAM(S): at 20:33

## 2022-01-01 RX ADMIN — SODIUM CHLORIDE 100 MILLILITER(S): 9 INJECTION INTRAMUSCULAR; INTRAVENOUS; SUBCUTANEOUS at 17:53

## 2022-01-01 RX ADMIN — CEFTRIAXONE 100 MILLIGRAM(S): 500 INJECTION, POWDER, FOR SOLUTION INTRAMUSCULAR; INTRAVENOUS at 22:55

## 2022-01-01 RX ADMIN — Medication 2: at 11:16

## 2022-01-01 RX ADMIN — Medication 3 MILLIGRAM(S): at 17:39

## 2022-01-01 RX ADMIN — Medication 5 MILLIGRAM(S): at 22:27

## 2022-01-01 RX ADMIN — PANTOPRAZOLE SODIUM 40 MILLIGRAM(S): 20 TABLET, DELAYED RELEASE ORAL at 11:37

## 2022-01-01 RX ADMIN — Medication 2 MILLIGRAM(S): at 17:54

## 2022-01-01 RX ADMIN — Medication 3 MILLIGRAM(S): at 11:59

## 2022-01-01 RX ADMIN — Medication 10 MILLIGRAM(S): at 10:31

## 2022-01-01 RX ADMIN — Medication 4 MILLIGRAM(S): at 14:11

## 2022-01-01 RX ADMIN — PANTOPRAZOLE SODIUM 40 MILLIGRAM(S): 20 TABLET, DELAYED RELEASE ORAL at 10:38

## 2022-01-01 RX ADMIN — AMLODIPINE BESYLATE 5 MILLIGRAM(S): 2.5 TABLET ORAL at 05:34

## 2022-01-01 RX ADMIN — SODIUM CHLORIDE 50 MILLILITER(S): 9 INJECTION INTRAMUSCULAR; INTRAVENOUS; SUBCUTANEOUS at 21:05

## 2022-01-01 RX ADMIN — Medication 2 MILLIGRAM(S): at 05:38

## 2022-01-01 RX ADMIN — Medication 10 MILLIGRAM(S): at 04:07

## 2022-01-01 RX ADMIN — Medication 250 MILLIGRAM(S): at 17:54

## 2022-01-01 RX ADMIN — Medication 0.5 MILLIGRAM(S): at 07:30

## 2022-01-01 RX ADMIN — Medication 27.5 MILLIGRAM(S): at 14:26

## 2022-01-01 RX ADMIN — Medication 4 MILLIGRAM(S): at 01:23

## 2022-01-01 RX ADMIN — Medication 2 MILLIGRAM(S): at 05:19

## 2022-01-01 RX ADMIN — AMLODIPINE BESYLATE 5 MILLIGRAM(S): 2.5 TABLET ORAL at 05:32

## 2022-01-01 RX ADMIN — Medication 10 MILLIGRAM(S): at 17:26

## 2022-01-01 RX ADMIN — Medication 0.5 MILLIGRAM(S): at 00:01

## 2022-01-01 RX ADMIN — Medication 0.5 MILLIGRAM(S): at 03:24

## 2022-01-01 RX ADMIN — Medication 5 MILLIGRAM(S): at 21:18

## 2022-01-01 RX ADMIN — Medication 4 MILLIGRAM(S): at 07:55

## 2022-01-01 RX ADMIN — Medication 10 MILLIGRAM(S): at 13:05

## 2022-01-01 RX ADMIN — Medication 27.5 MILLIGRAM(S): at 20:46

## 2022-01-01 RX ADMIN — Medication 10 MILLIGRAM(S): at 17:12

## 2022-01-01 RX ADMIN — Medication 400 MILLIGRAM(S): at 18:41

## 2022-01-01 RX ADMIN — Medication 650 MILLIGRAM(S): at 22:00

## 2022-01-01 RX ADMIN — AMLODIPINE BESYLATE 5 MILLIGRAM(S): 2.5 TABLET ORAL at 05:20

## 2022-01-01 RX ADMIN — SODIUM CHLORIDE 100 MILLILITER(S): 9 INJECTION INTRAMUSCULAR; INTRAVENOUS; SUBCUTANEOUS at 21:22

## 2022-01-01 RX ADMIN — Medication 10 MILLIGRAM(S): at 14:08

## 2022-01-01 RX ADMIN — Medication 5 MILLIGRAM(S): at 22:07

## 2022-01-01 RX ADMIN — Medication 250 MILLIGRAM(S): at 05:46

## 2022-01-01 RX ADMIN — Medication 27.5 MILLIGRAM(S): at 14:11

## 2022-01-01 RX ADMIN — OXYCODONE HYDROCHLORIDE 5 MILLIGRAM(S): 5 TABLET ORAL at 13:18

## 2022-01-01 RX ADMIN — DEXMEDETOMIDINE HYDROCHLORIDE IN 0.9% SODIUM CHLORIDE 1.57 MICROGRAM(S)/KG/HR: 4 INJECTION INTRAVENOUS at 20:45

## 2022-01-01 RX ADMIN — ENOXAPARIN SODIUM 40 MILLIGRAM(S): 100 INJECTION SUBCUTANEOUS at 21:19

## 2022-01-01 RX ADMIN — Medication 10 MILLIGRAM(S): at 02:57

## 2022-01-01 RX ADMIN — HYDROMORPHONE HYDROCHLORIDE 0.3 MILLIGRAM(S): 2 INJECTION INTRAMUSCULAR; INTRAVENOUS; SUBCUTANEOUS at 10:27

## 2022-01-01 RX ADMIN — Medication 27.5 MILLIGRAM(S): at 21:20

## 2022-01-01 RX ADMIN — Medication 2: at 16:50

## 2022-01-01 RX ADMIN — SODIUM CHLORIDE 1 GRAM(S): 9 INJECTION INTRAMUSCULAR; INTRAVENOUS; SUBCUTANEOUS at 05:47

## 2022-01-01 RX ADMIN — Medication 400 MILLIGRAM(S): at 20:56

## 2022-01-01 RX ADMIN — MORPHINE SULFATE 1 MILLIGRAM(S): 50 CAPSULE, EXTENDED RELEASE ORAL at 06:31

## 2022-01-01 RX ADMIN — Medication 400 MILLIGRAM(S): at 23:15

## 2022-01-01 RX ADMIN — Medication 2 MILLIGRAM(S): at 12:54

## 2022-01-01 RX ADMIN — Medication 2: at 17:39

## 2022-01-01 RX ADMIN — Medication 650 MILLIGRAM(S): at 20:30

## 2022-01-01 RX ADMIN — POLYETHYLENE GLYCOL 3350 17 GRAM(S): 17 POWDER, FOR SOLUTION ORAL at 11:04

## 2022-01-01 RX ADMIN — Medication 400 MILLIGRAM(S): at 12:27

## 2022-01-01 RX ADMIN — PANTOPRAZOLE SODIUM 40 MILLIGRAM(S): 20 TABLET, DELAYED RELEASE ORAL at 10:14

## 2022-01-01 RX ADMIN — Medication 5 MILLIGRAM(S): at 21:22

## 2022-01-01 RX ADMIN — Medication 4 MILLIGRAM(S): at 17:48

## 2022-01-01 RX ADMIN — Medication 500 MILLIGRAM(S): at 12:46

## 2022-01-01 RX ADMIN — DEXMEDETOMIDINE HYDROCHLORIDE IN 0.9% SODIUM CHLORIDE 4.7 MICROGRAM(S)/KG/HR: 4 INJECTION INTRAVENOUS at 17:47

## 2022-01-01 RX ADMIN — LEVETIRACETAM 420 MILLIGRAM(S): 250 TABLET, FILM COATED ORAL at 05:39

## 2022-01-01 RX ADMIN — LEVETIRACETAM 600 MILLIGRAM(S): 250 TABLET, FILM COATED ORAL at 14:11

## 2022-01-01 RX ADMIN — Medication 3 MILLIGRAM(S): at 05:33

## 2022-01-01 RX ADMIN — LEVETIRACETAM 500 MILLIGRAM(S): 250 TABLET, FILM COATED ORAL at 04:31

## 2022-01-01 RX ADMIN — Medication 4 MILLIGRAM(S): at 05:07

## 2022-01-01 RX ADMIN — Medication 10 MILLIGRAM(S): at 01:05

## 2022-01-01 RX ADMIN — NICARDIPINE HYDROCHLORIDE 25 MG/HR: 30 CAPSULE, EXTENDED RELEASE ORAL at 13:59

## 2022-01-01 RX ADMIN — Medication 10 MILLIGRAM(S): at 20:52

## 2022-01-01 RX ADMIN — Medication 4 MILLIGRAM(S): at 22:55

## 2022-01-01 RX ADMIN — PANTOPRAZOLE SODIUM 40 MILLIGRAM(S): 20 TABLET, DELAYED RELEASE ORAL at 11:58

## 2022-01-01 RX ADMIN — Medication 100 MILLIEQUIVALENT(S): at 05:25

## 2022-01-01 RX ADMIN — Medication 2: at 21:24

## 2022-01-01 RX ADMIN — Medication 2 MILLIGRAM(S): at 11:03

## 2022-01-01 RX ADMIN — SODIUM CHLORIDE 1 GRAM(S): 9 INJECTION INTRAMUSCULAR; INTRAVENOUS; SUBCUTANEOUS at 22:07

## 2022-01-01 RX ADMIN — PANTOPRAZOLE SODIUM 40 MILLIGRAM(S): 20 TABLET, DELAYED RELEASE ORAL at 12:00

## 2022-01-01 RX ADMIN — Medication 2: at 21:11

## 2022-01-01 RX ADMIN — PANTOPRAZOLE SODIUM 40 MILLIGRAM(S): 20 TABLET, DELAYED RELEASE ORAL at 12:18

## 2022-01-01 RX ADMIN — LEVETIRACETAM 500 MILLIGRAM(S): 250 TABLET, FILM COATED ORAL at 05:27

## 2022-01-01 RX ADMIN — Medication 4: at 12:05

## 2022-01-01 RX ADMIN — LEVETIRACETAM 500 MILLIGRAM(S): 250 TABLET, FILM COATED ORAL at 17:19

## 2022-01-01 RX ADMIN — Medication 500 MILLIGRAM(S): at 11:10

## 2022-01-01 RX ADMIN — Medication 1 MILLIGRAM(S): at 11:25

## 2022-01-01 RX ADMIN — Medication 1 TABLET(S): at 12:14

## 2022-01-01 RX ADMIN — Medication 25 MILLIGRAM(S): at 17:42

## 2022-01-01 RX ADMIN — LEVETIRACETAM 500 MILLIGRAM(S): 250 TABLET, FILM COATED ORAL at 17:39

## 2022-01-01 RX ADMIN — Medication 3 MILLIGRAM(S): at 18:24

## 2022-01-01 RX ADMIN — Medication 250 MILLIGRAM(S): at 05:19

## 2022-01-01 RX ADMIN — Medication 2 MILLIGRAM(S): at 11:47

## 2022-01-01 RX ADMIN — Medication 4 MILLIGRAM(S): at 02:09

## 2022-01-01 RX ADMIN — Medication 2 MILLIGRAM(S): at 05:46

## 2022-01-01 RX ADMIN — Medication 5 MILLIGRAM(S): at 21:12

## 2022-01-01 RX ADMIN — MORPHINE SULFATE 1 MILLIGRAM(S): 50 CAPSULE, EXTENDED RELEASE ORAL at 05:08

## 2022-01-01 RX ADMIN — SODIUM CHLORIDE 60 MILLILITER(S): 9 INJECTION INTRAMUSCULAR; INTRAVENOUS; SUBCUTANEOUS at 20:45

## 2022-01-13 NOTE — ED PROVIDER NOTE - ATTENDING CONTRIBUTION TO CARE
80 yo f transferred from [facility] [place] for further evaluation of brain mass. CT chest/abdomen/pelvis w IV contrast per neurosurgical recommendations. Monitor and reassess.

## 2022-01-13 NOTE — ED ADULT NURSE NOTE - NSIMPLEMENTINTERV_GEN_ALL_ED
Implemented All Fall Risk Interventions:  [place] to call system. Call bell, personal items and telephone within reach. Instruct patient to call for assistance. Room bathroom lighting operational. Non-slip footwear when patient is off stretcher. Physically safe environment: no spills, clutter or unnecessary equipment. Stretcher in lowest position, wheels locked, appropriate side rails in place. Provide visual cue, wrist band, yellow gown, etc. Monitor gait and stability. Monitor for mental status changes and reorient to person, place, and time. Review medications for side effects contributing to fall risk. Reinforce activity limits and safety measures with patient and family.

## 2022-01-13 NOTE — ED PROVIDER NOTE - CADM POA PRESS ULCER
Total Volume Injected In Cc (Will Not Affected Billing): 80 Expiration Date (Optional): 12/2019 Procedure Information: Please note that the numeric value listed in the Medication (1) and associated J-code units and Medication (2) and associated J-code units variables are j-code amounts and do not represent either the concentration or the total amount of the medications injected.  I strongly recommend selecting no to the Render J-code information in note question. This will allow your note to be more clear. If you are billing j-codes with your injection codes you need to document the total amount of the medication injected. This amount should match the j-code units. For example, if you are injecting Triamcinolone 40mg as an intramuscular injection you would select 40 for the dose field and mg for the units. This would allow you to document  with 4 units (40mg = 10mg x 4). The total volume is not used to calculate j-codes only the amount of the medication administered. Render J-Code Information In Note?: yes Post-Care Instructions: I reviewed with the patient in detail post-care instructions. Patient understands to keep the injection sites clean and call the clinic if there is any redness, swelling or pain. Consent: The risks of the medication were reviewed with the patient. Medication (1) And Associated J-Code Units: Taltz (Ixekizumab), 80 mg Lot # (Optional): [id number] Detail Level: None Dose Administered (Numbers Only): 0 Treatment Number: 2 Route: SC No

## 2022-01-13 NOTE — PATIENT PROFILE ADULT - FALL HARM RISK - HARM RISK INTERVENTIONS
Assistance with ambulation/Assistance OOB with selected safe patient handling equipment/Communicate Risk of Fall with Harm to all staff/Discuss with provider need for PT consult/Monitor gait and stability/Reinforce activity limits and safety measures with patient and family/Tailored Fall Risk Interventions/Visual Cue: Yellow wristband and red socks/Bed in lowest position, wheels locked, appropriate side rails in place/Call bell, personal items and telephone in reach/Instruct patient to call for assistance before getting out of bed or chair/Non-slip footwear when patient is out of bed/[place] to call system/Physically safe environment - no spills, clutter or unnecessary equipment/Purposeful Proactive Rounding/Room/bathroom lighting operational, light cord in reach

## 2022-01-13 NOTE — ED ADULT NURSE NOTE - CHIEF COMPLAINT QUOTE
Pt A&Ox4 states "I fell."  BIBA from [facility] c/o fall in shower and hit head unsure of LOC. Patient was unable to get up, pt was seen at [facility] [place] and transferred fro trauma, Trauma B activated. Pt had incidental finding of brain mass. Received Labetalol 10 mg at 1730

## 2022-01-13 NOTE — H&P ADULT - NSHPLABSRESULTS_GEN_ALL_CORE
Vital Signs Last 24 Hrs  T(C): 37.2 (13 Jan 2022 18:47), Max: 37.2 (13 Jan 2022 18:47)  T(F): 98.9 (13 Jan 2022 18:47), Max: 98.9 (13 Jan 2022 18:47)  HR: 67 (13 Jan 2022 18:47) (67 - 67)  BP: 156/82 (13 Jan 2022 18:47) (156/82 - 156/82)  BP(mean): --  RR: 16 (13 Jan 2022 18:47) (16 - 16)  SpO2: 98% (13 Jan 2022 18:47) (98% - 98%)      1/13/22 CTH ([facility]): Butterfly type hyperattenuating lesion involving the corpus callosum concerning for malignancy with glioblastoma and primary CNS lymphoma as top differential diagnostic considerations. No acute intracranial hemorrhage, midline shift, or hydrocephalus. More sensitive evaluation with contrast-enhanced MRI is recommended, if no contraindications exist.

## 2022-01-13 NOTE — H&P ADULT - ASSESSMENT
80 y/o F PMH HTN transferred from [facility] [place] s/p trip & fall at home with +head strike. No LOC. CTH at [facility] [place] reveals Butterfly type hyperattenuating lesion involving the corpus callosum concerning for malignancy with glioblastoma and primary CNS lymphoma.    Plan:  -D/w Dr. Frausto  -Q2h neuro checks  -Admit to neurosurgery SDU  -CTA C/A/P for metastatic workup  -MRI brain +/- with brain lab protocol   -Keppra 500 mg BID for seizure PPX  -SBP < 150; Labetalol/Hydralazine PRN  -NS @ 50; may start regular diet after contrasted study  -Pain control PRN  -SCDs for DVT PPX; hold AC/AP agents at this time   -Labs in AM   -Trauma clearance requested  80 y/o F PMH HTN transferred from [facility] [place]/p trip & fall at home with +head strike. No LOC. 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    Plan:  -D/w Dr. Frausto  -Q2h neuro checks  -Admit to neurosurgery SDU  -CTA C/A/P for metastatic workup  -MRI brain +/- with brain lab protocol   -Keppra 500 mg BID for seizure PPX  -SBP < 150; Labetalol/Hydralazine PRN  -NS @ 50; may start regular diet after contrasted study  -Pain control PRN  -SCDs for DVT PPX; hold AC/AP agents at this time   -Labs in AM   -CIWA protocol ordered  -Trauma clearance requested

## 2022-01-13 NOTE — ED PROVIDER NOTE - PHYSICAL EXAMINATION
Const: Awake, alert and oriented. In no acute distress. Well appearing.  HEENT: NC/AT. Moist mucous membranes.  Eyes: No scleral icterus. EOMI.  Neck:. Soft and supple. Full ROM without pain.  Cardiac: Regular rate and regular rhythm. +S1/S2. Peripheral pulses 2+ and symmetric. No LE edema.  Resp: Speaking in full sentences. No evidence of respiratory distress. No wheezes, rales or rhonchi.  Abd: Soft, non-tender, non-distended. Normal bowel sounds in all 4 quadrants. No guarding or rebound.  Back: Spine midline and non-tender. No CVAT.  Skin: No rashes, abrasions or lacerations.  Lymph: No cervical lymphadenopathy.  Neuro: Awake, alert & oriented x 3. Moves all extremities symmetrically. No focal neurologic deficits on exam.

## 2022-01-13 NOTE — ED ADULT NURSE REASSESSMENT NOTE - NS ED NURSE REASSESS COMMENT FT1
pt handed off to RACHEL Lira in stable condition. Pt oriented to unit, plan of care explained. call bell system explained to pt. no apparent distress noted at this time.
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
## 2022-01-13 NOTE — H&P ADULT - NSHPPHYSICALEXAM_GEN_ALL_CORE
PHYSICAL EXAM:  GENERAL: NAD, well-groomed  HEAD:  Atraumatic, normocephalic  EYES: Conjunctiva and sclera clear; corneal reflex intact  ENMT: No tonsillar erythema, exudates, or enlargement; moist mucous membranes, good dentition, no lesions  NECK: Supple, no JVD, normal thyroid  MALLY COMA SCORE: E-4 V-5 M-6 = 15  MENTAL STATUS: AAO x2 (self, place); Awake; Opens eyes spontaneously; Appropriately conversant without aphasia; following simple commands  CRANIAL NERVES: Visual fields full to confrontation, PERRL. EOMI without nystagmus. Facial sensation intact V1-3 distribution b/l. Face symmetric w/ normal eye closure and smile, tongue midline.   MOTOR: RUE/LUE/RLE 5/5, LLE 4+/5  SENSATION: grossly intact to light touch all extremities  COORDINATION: No upper extremity dysmetria  EXTREMITIES:  2+ peripheral pulses, no clubbing, cyanosis, or edema  SKIN: Warm, dry; no rashes or lesions

## 2022-01-13 NOTE — ED PROVIDER NOTE - OBJECTIVE STATEMENT
82 yo female hx of HTN presents from [facility] [place] for evaluation after CT head scan there revealed possible glioblastoma. Patient states that she fell in the shower today, hit her head. She then presented to [facility] [place] where the finding of possible GBM was revealed, transferred to [facility] for further evaluation. Patient states that she has not experienced any headache, dizziness, LOC, vision changes, weakness/numbness/tingling in extremities. She has just experienced generalized weakness during this time. Denies recent fever/chills, cough, sore throat, sob, cp, abd pain, n/v/d, urinary complaints.

## 2022-01-13 NOTE — H&P ADULT - HISTORY OF PRESENT ILLNESS
Patient is a 81y old  Female who presents with a chief complaint of brain mass.    HPI: Patient is a 82 y/o F PMH HTN [place] [facility] [place]. Patient states she came home from work, didn't feel well and then fell and hit her head into her bathtub. Unsure if she syncopized. Denies LOC. Patient called EMS herself. Patient states she's been feeling "off" for 2 weeks but can't describe what was wrong with her. She noticed today that she felt unsteady on her feet. Currently denies headache, use of blood thinners, cancer history, visual disturbance, nausea/vomiting, fever/chills, weakness, numbness/tingling. CTH at [facility] [place] Butterfly type hyperattenuating lesion involving the corpus callosum concerning for malignancy with glioblastoma and primary CNS lymphoma.

## 2022-01-13 NOTE — ED ADULT TRIAGE NOTE - CHIEF COMPLAINT QUOTE
Pt A&Ox4 states "I fell."  BIBA from [facility] c/o fall in shower and hit head unsure of LOC. Patient was unable to get up, [place] [facility] [place], Trauma B activated. Pt had incidental finding of brain mass. Received Labetalol 10 mg at 1730

## 2022-01-13 NOTE — PATIENT PROFILE ADULT - STATED REASON FOR ADMISSION
Pt. is a transfer [place] [facility] [place] in the [place]. Pt. states she did hit her head denies LOC. Pt. CTH revealed hyperattenuating lesion concern for malignancy with glioblastoma. Pt. remains to have B/L LE weakness. Admitted to neuro surgery for further eval.

## 2022-01-13 NOTE — ED ADULT NURSE NOTE - OBJECTIVE STATEMENT
Pt AAOX3, [place] [facility] [place] states she had been feeling "off" for the past 2 weeks, pt states today she had new onset of difficulty walking and fell in the bathroom, pt states she hit her head on the bath, pt denies LOC, pt denies any vision changes, pt CT head scan there revealed possible glioblastoma, Pt respirations even and unlabored, pt able to move all extremities well

## 2022-01-13 NOTE — ED PROVIDER NOTE - CLINICAL SUMMARY MEDICAL DECISION MAKING FREE TEXT BOX
80 yo f transferred from [facility] [place] for further evaluation of brain mass. CT chest/abdomen/pelvis w IV contrast per neurosurgical recommendations. Monitor and reassess.

## 2022-01-14 NOTE — PHYSICAL THERAPY INITIAL EVALUATION ADULT - MUSCLE TONE ASSESSMENT, REHAB EVAL
"      Spiritual Plan of Care    Pt Name: Marine Darling Pt : 1949  Date: 2020    Referral source: Patient  Reason for visit: Trigger  Visited with: Patient  Patient Assessment: Distrusts process , Overwhelmed  Patient  Intervention: Emotional Support, Empathic Listening, Affirmation, Silence  Spiritual Plan of Care: Follow up if requested  Patient Reported Outcome:  Other (comment)(unsure, pt uncooperative)  Time Spent: 15 minutes with pt, phone visit    Spiritual/Emotional Assessment    Understanding: The patient reviewed events leading to this admission stating he came in through the ED. Needs/Barriers: The patient identified multiple stressors including being in the hospital.  Hopes/Goals: The patient appear to be grieving the loss of being at home with his family. Support System: The patient identified patient's support system as (pt did not respond to questions about support systerm). .  Beliefs/Values: The patient reported (did not share beliefs/practices) serves patient well at difficult times. Resources: The patient identified coping strategies/skills as (unable to share with ). Outcome:  Pt had altered mental status yesterday per ED note, but subsequent notes state improvement today. Pt engaged minimally with . Pt states ""this is a total nightmare\"" when asked how his day is going, but declines to elaborate or answer further questions/respond to .  advised pt on what chaplains can offer.  asked if pt would prefer and in-person  visit and he answered \""yes, I suppose so. \""    Recommendations:  Additional  services available prn  " bilateral upper extremities/bilateral lower extremities/normal

## 2022-01-14 NOTE — PROGRESS NOTE ADULT - SUBJECTIVE AND OBJECTIVE BOX
NEUROSURGERY PROGRESS NOTE:      HPI: Patient is a 82 y/o F PMH HTN transferred [place] [facility] [place]. Patient states she came home from work, didn't feel well and then fell and hit her head into her bathtub. Unsure if she syncopized. Denies LOC. Patient called EMS herself. Patient states she's been feeling "off" for 2 weeks but can't describe what was wrong with her. She noticed today that she felt unsteady on her feet. Currently denies headache, use of blood thinners, cancer history, visual disturbance, nausea/vomiting, fever/chills, weakness, numbness/tingling. 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    1/14 pt seen and states is at baseline w LLE weakness/ ataxia, denied any new or worsening sensorimotor changes  -headache   - Nausea / - Vomiting  denies visual changes  denies C/T/LS  Spine pain  + void  + OOB w assist  + diet  + venodynes b/l when in bed       MEDICATIONS  (STANDING):  levETIRAcetam 500 milliGRAM(s) Oral every 12 hours  multivitamin 1 Tablet(s) Oral daily  sodium chloride 0.9%. 1000 milliLiter(s) (50 mL/Hr) IV Continuous <Continuous>    MEDICATIONS  (PRN):  acetaminophen     Tablet .. 650 milliGRAM(s) Oral every 6 hours PRN Temp greater or equal to 38C (100.4F), Mild Pain (1 - 3)  hydrALAZINE Injectable 10 milliGRAM(s) IV Push every 2 hours PRN SBP > 150  labetalol Injectable 10 milliGRAM(s) IV Push every 2 hours PRN Systolic blood pressure > 150  ondansetron   Disintegrating Tablet 4 milliGRAM(s) Oral every 6 hours PRN Nausea    Allergies    Allergy Status Unknown    Intolerances      Vital Signs Last 24 Hrs  T(C): 36.7 (14 Jan 2022 15:44), Max: 37.2 (13 Jan 2022 18:47)  T(F): 98.1 (14 Jan 2022 15:44), Max: 98.9 (13 Jan 2022 18:47)  HR: 74 (14 Jan 2022 15:44) (61 - 80)  BP: 150/87 (14 Jan 2022 15:44) (133/75 - 176/80)  BP(mean): --  RR: 18 (14 Jan 2022 15:44) (16 - 18)  SpO2: 95% (14 Jan 2022 15:44) (95% - 100%)        PHYSICAL EXAM:          LABS:                        13.9   6.53  )-----------( 270      ( 14 Jan 2022 07:06 )             40.8     01-14    137  |  100  |  7.5<L>  ----------------------------<  87  3.7   |  22.0  |  0.50    Ca    9.1      14 Jan 2022 07:06  Phos  3.9     01-14  Mg     2.1     01-14    TPro  6.1<L>  /  Alb  4.2  /  TBili  0.7  /  DBili  0.2  /  AST  21  /  ALT  18  /  AlkPhos  101  01-14    PT/INR - ( 14 Jan 2022 07:06 )   PT: 12.2 sec;   INR: 1.05 ratio         PTT - ( 14 Jan 2022 07:06 )  PTT:35.8 sec    CSF:       RADIOLOGY & ADDITIONAL TESTS:  no new NSx images available for review     I spent a total time of 35 mins with the patient at bedside of which more than 50% of time was spent on counseling/coordination of care   NEUROSURGERY PROGRESS NOTE:      HPI: Patient is a 82 y/o F PMH HTN transferred from [facility] [place] s/p trip & fall. Patient states she came home from work, didn't feel well and then fell and hit her head into her bathtub. Unsure if she syncopized. Denies LOC. Patient called EMS herself. Patient states she's been feeling "off" for 2 weeks but can't describe what was wrong with her. She noticed today that she felt unsteady on her feet. Currently denies headache, use of blood thinners, cancer history, visual disturbance, nausea/vomiting, fever/chills, weakness, numbness/tingling. CTH at 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    1/14 pt seen and states is at baseline w LLE weakness/ ataxia, denied any new or worsening sensorimotor changes  -headache   - Nausea / - Vomiting  denies visual changes  denies C/T/LS  Spine pain  + void  + OOB w assist  + diet  + venodynes b/l when in bed       MEDICATIONS  (STANDING):  levETIRAcetam 500 milliGRAM(s) Oral every 12 hours  multivitamin 1 Tablet(s) Oral daily  sodium chloride 0.9%. 1000 milliLiter(s) (50 mL/Hr) IV Continuous <Continuous>    MEDICATIONS  (PRN):  acetaminophen     Tablet .. 650 milliGRAM(s) Oral every 6 hours PRN Temp greater or equal to 38C (100.4F), Mild Pain (1 - 3)  hydrALAZINE Injectable 10 milliGRAM(s) IV Push every 2 hours PRN SBP > 150  labetalol Injectable 10 milliGRAM(s) IV Push every 2 hours PRN Systolic blood pressure > 150  ondansetron   Disintegrating Tablet 4 milliGRAM(s) Oral every 6 hours PRN Nausea    Allergies    Allergy Status Unknown    Intolerances      Vital Signs Last 24 Hrs  T(C): 36.7 (14 Jan 2022 15:44), Max: 37.2 (13 Jan 2022 18:47)  T(F): 98.1 (14 Jan 2022 15:44), Max: 98.9 (13 Jan 2022 18:47)  HR: 74 (14 Jan 2022 15:44) (61 - 80)  BP: 150/87 (14 Jan 2022 15:44) (133/75 - 176/80)  BP(mean): --  RR: 18 (14 Jan 2022 15:44) (16 - 18)  SpO2: 95% (14 Jan 2022 15:44) (95% - 100%)        PHYSICAL EXAM:  GENERAL: NAD, well-groomed  HEAD:  Atraumatic, normocephalic  EYES: Conjunctiva and sclera clear; corneal reflex intact  ENMT: No tonsillar erythema, exudates, or enlargement; moist mucous membranes, good dentition, no lesions  NECK: Supple, no JVD, normal thyroid, nontender posteriorly   MALLY COMA SCORE: E-4 V-5 M-6 = 15  MENTAL STATUS: AAO x2 (self, place); Awake; Opens eyes spontaneously; Appropriately conversant without aphasia; following simple commands  CRANIAL NERVES: Visual fields full to confrontation, PERRL. EOMI without nystagmus. Facial sensation intact V1-3 distribution b/l. Face symmetric w/ normal eye closure and smile, tongue midline.   MOTOR: RUE/LUE/RLE 5/5, LLE 4+/5  SENSATION: grossly intact to light touch all extremities  COORDINATION: No upper extremity dysmetria  EXTREMITIES:  2+ peripheral pulses, no clubbing, cyanosis, or edema  SKIN: Warm, dry; no rashes or lesions        LABS:                        13.9   6.53  )-----------( 270      ( 14 Jan 2022 07:06 )             40.8     01-14    137  |  100  |  7.5<L>  ----------------------------<  87  3.7   |  22.0  |  0.50    Ca    9.1      14 Jan 2022 07:06  Phos  3.9     01-14  Mg     2.1     01-14    TPro  6.1<L>  /  Alb  4.2  /  TBili  0.7  /  DBili  0.2  /  AST  21  /  ALT  18  /  AlkPhos  101  01-14    PT/INR - ( 14 Jan 2022 07:06 )   PT: 12.2 sec;   INR: 1.05 ratio    PTT - ( 14 Jan 2022 07:06 )  PTT:35.8 sec        RADIOLOGY & ADDITIONAL TESTS:  < from: CT Chest w/ IV Cont (01.14.22 @ 07:03) >  ACC: [id number] EXAM:  CT ABDOMEN AND PELVIS IC                        ACC: [id number] EXAM:  CT CHEST IC                          PROCEDURE DATE:  01/14/2022          INTERPRETATION:  CLINICAL INFORMATION: Brain mass.    COMPARISON: None.    CONTRAST/COMPLICATIONS:  IV Contrast: Omnipaque  95 cc administered   5 cc discarded  Oral Contrast: NONE  Complications: None reported at time of study completion    PROCEDURE:  CT of the Chest, Abdomen and Pelvis was performed.  Sagittal and coronal reformats were performed.    FINDINGS:  CHEST:  LUNGS AND LARGE AIRWAYS: Patent central airways. Emphysematous changes.   Biapical pleural parenchymal scarring. Bilateral lower lobe dependent changes/atelectasis. Calcified granuloma in the left upper lobe.  PLEURA: No pleural effusion.  VESSELS: Thoracic aorta normal in caliber with atherosclerotic changes.   Coronary artery calcifications.  HEART: Heart size is normal. No pericardial effusion.  MEDIASTINUM AND ANGELITA: Mildly enlarged mediastinal and bilateral hilar lymph nodes including a right hilar lymph node measuring 1.8 x 1.1 cm   (series 3 image 69).  CHEST WALL AND LOWER NECK: No enlarged axillary lymph nodes.    ABDOMEN AND PELVIS:  LIVER: Hepatic cysts measuring up to 4.5 cm multiple subcentimeter low-attenuation lesions, too small to characterize. There is a region of low attenuation within the central aspect of the left hepatic lobe measuring approximately 2.8 cm with mild biliary ductal dilatation in the left hepatic lobe. Peripherally calcified structure in the left hepatic lobe measuring 1.1 cm, possibly a cyst or a portion of the biliary tree.  BILE DUCTS: See above.  GALLBLADDER: No calcified gallstones. Normal caliber wall.  SPLEEN: Within normal limits.  PANCREAS: Within normal limits.  ADRENALS: Within normal limits.  KIDNEYS/URETERS: No hydronephrosis. 1.2 cm cyst in the mid right kidney with additional subcentimeter renal lesions, too small to characterize.    BLADDER: Unremarkable.  REPRODUCTIVE ORGANS: Hysterectomy.    BOWEL: Colonic diverticulosis without evidence of diverticulitis. No bowel obstruction. Appendix is normal.  PERITONEUM: No ascites.  VESSELS: Atherosclerotic changes. Abdominal aorta normal in caliber.  RETROPERITONEUM/LYMPH NODES: Mildly prominent periportal lymph nodes including 1.6 x 0.8 cm (series 3 image 139).  ABDOMINAL WALL: Fat-containing right inguinal hernia.  BONES: Degenerative changes in the spine.    IMPRESSION:    Region of low attenuation in the central aspect of the left hepatic lobe measuring approximately 2.8 cm with mild biliary ductal dilatation in the left hepatic lobe and left hepatic lobe atrophy; a pre and post IV contrast MRI/MRCP of the abdomen is recommended to exclude an underlying mass.  Mildly enlarged mediastinal and bilateral hilar lymph nodes.  --- End of Report ---    SANDY KAUFMAN MD; Attending Radiologist  This document has been electronically signed. Jan 14 2022  8:41AM  < end of copied text >        I spent a total time of 35 mins with the patient at bedside of which more than 50% of time was spent on counseling/coordination of care

## 2022-01-14 NOTE — CONSULT NOTE ADULT - SUBJECTIVE AND OBJECTIVE BOX
TRAUMA SERVICE (Acute Care Surgery / ACS - ) - CONSULT NOTE  --------------------------------------------------------------------------------------------    TRAUMA ACTIVATION LEVEL:     MECHANISM OF INJURY:      [] Blunt  	[] MVC	[] Fall	[] Pedestrian Struck	[] Motorcycle accident      [] Penetrating  	[] Gun Shot Wound 		[] Stab Wound    GCS: 	E: 4	V: 5	M: 6      HPI:   Patient is a 81y old  Female who presents with a chief complaint of Brain mass (13 Jan 2022 20:29)    HPI: 81F with PMH HTN presented as transfer from [facility] s/p fall, +HS, -LOC. Patient states she was initially unable to stand up but eventually pulled herself up and called 911. Does not take blood thinners. Denies pain anywhere. On CT head at [facility], she was found to have a large cerebral mass and transferred here for neurosurgery.     Primary Survey:    A - airway intact  B - bilateral breath sounds and good chest rise  C - initial BP: 139/80 (01-14-22 @ 08:43) , HR: 64 (01-14-22 @ 08:43) , palpable pulses in all extremities  D - GCS 15 on arrival  Exposure obtained      Secondary Survey:   General: NAD  HEENT: Normocephalic, atraumatic  Neck: Soft, midline trachea  Chest: No chest wall tenderness.   Cardiac: S1, S2, RRR  Respiratory: Bilateral breath sounds, clear and equal bilaterally  Abdomen: Soft, non-distended, non-tender, no rebound, no guarding, no masses palpated  Pelvis: Stable, non-tender, no ecchymosis  Ext: palp radial b/l UE, b/l DP palp in Lower Extrem, motor and sensory grossly intact in all 4 extremities  Back: no TTP, no palpable runoff/stepoff/deformity    Patient denies fevers/chills, denies lightheadedness/dizziness, denies SOB/chest pain, denies nausea/vomiting, denies constipation/diarrhea.  ***    ROS: 10-system review is otherwise negative except HPI above.      PAST MEDICAL & SURGICAL HISTORY:  HTN (hypertension)    No significant past surgical history      FAMILY HISTORY:  No pertinent family history in first degree relatives      [] Family history not pertinent as reviewed with the patient and family    SOCIAL HISTORY:      ALLERGIES: Allergy Status Unknown      HOME MEDICATIONS:     CURRENT MEDICATIONS  MEDICATIONS (STANDING): levETIRAcetam 500 milliGRAM(s) Oral every 12 hours  multivitamin 1 Tablet(s) Oral daily  sodium chloride 0.9%. 1000 milliLiter(s) IV Continuous <Continuous>    MEDICATIONS (PRN):acetaminophen     Tablet .. 650 milliGRAM(s) Oral every 6 hours PRN Temp greater or equal to 38C (100.4F), Mild Pain (1 - 3)  hydrALAZINE Injectable 10 milliGRAM(s) IV Push every 2 hours PRN SBP > 150  labetalol Injectable 10 milliGRAM(s) IV Push every 2 hours PRN Systolic blood pressure > 150  ondansetron   Disintegrating Tablet 4 milliGRAM(s) Oral every 6 hours PRN Nausea    --------------------------------------------------------------------------------------------    Vitals:   T(C): 36.7 (01-14-22 @ 08:43), Max: 37.2 (01-13-22 @ 18:47)  HR: 64 (01-14-22 @ 08:43) (61 - 80)  BP: 139/80 (01-14-22 @ 08:43) (133/75 - 176/80)  RR: 18 (01-14-22 @ 08:43) (16 - 18)  SpO2: 100% (01-14-22 @ 08:43) (95% - 100%)        PHYSICAL EXAM:   General: NAD  HEENT: Normocephalic, atraumatic  Neck: Soft, midline trachea.  Chest: No chest wall tenderness.   Cardiac: S1, S2, RRR  Respiratory: Bilateral breath sounds, clear and equal bilaterally  Abdomen: Soft, non-distended, non-tender TTP periumbilically, no rebound, no guarding  Pelvis: Stable, non-tender, no ecchymosis  Ext: palp radial b/l UE, b/l DP palp in Lower Extrem.   Back: no TTP, no palpable runoff/stepoff/deformity    --------------------------------------------------------------------------------------------    LABS  CBC (01-14 @ 07:06)                              13.9                           6.53    )----------------(  270        66.6  % Neutrophils, 17.6  % Lymphocytes, ANC: 4.35                                40.8      BMP (01-14 @ 07:10)             --      |  --      |  --    		Ca++ --      Ca --                 ---------------------------------( --    		Mg --                 --      |  --      |  --    			Ph 3.9     BMP (01-14 @ 07:06)             137     |  100     |  7.5<L>		Ca++ --      Ca 9.1                ---------------------------------( 87    		Mg 2.1                3.7     |  22.0    |  0.50  			Ph --        LFTs (01-14 @ 07:10)      TPro 6.1<L> / Alb 4.2 / TBili 0.7 / DBili 0.2 / AST 21 / ALT 18 / AlkPhos 101  LFTs (01-14 @ 07:06)      TPro 6.0<L> / Alb 3.9 / TBili 0.6 / DBili -- / AST 19 / ALT 18 / AlkPhos 97    Coags (01-14 @ 07:06)  aPTT 35.8<H> / INR 1.05 / PT 12.2      --------------------------------------------------------------------------------------------    IMAGING  < from: CT Chest w/ IV Cont (01.14.22 @ 07:03) >  FINDINGS:  CHEST:  LUNGS AND LARGE AIRWAYS: Patent central airways. Emphysematous changes.   Biapical pleural parenchymal scarring. Bilateral lower lobe dependent   changes/atelectasis. Calcified granuloma in the left upper lobe.  PLEURA: No pleural effusion.  VESSELS: Thoracic aorta normal in caliber with atherosclerotic changes.   Coronary artery calcifications.  HEART: Heart size is normal. No pericardial effusion.  MEDIASTINUM AND ANGELITA: Mildly enlarged mediastinal and bilateral hilar   lymph nodes including a right hilar lymph node measuring 1.8 x 1.1 cm   (series 3 image 69).  CHEST WALL AND LOWER NECK: No enlarged axillary lymph nodes.    ABDOMEN AND PELVIS:  LIVER: Hepatic cysts measuring up to 4.5 cm multiple subcentimeter   low-attenuation lesions, too small to characterize. There is a region of   low attenuation within the central aspect of the left hepatic lobe   measuring approximately 2.8 cm with mild biliary ductal dilatation in the   left hepatic lobe. Peripherally calcified structure in the left hepatic   lobe measuring 1.1 cm, possibly a cyst or a portion of the biliary tree.  BILE DUCTS: See above.  GALLBLADDER: No calcified gallstones. Normal caliber wall.  SPLEEN: Within normal limits.  PANCREAS: Within normal limits.  ADRENALS: Within normal limits.  KIDNEYS/URETERS: No hydronephrosis. 1.2 cm cyst in the mid right kidney   with additional subcentimeter renal lesions, too small to characterize.    BLADDER: Unremarkable.  REPRODUCTIVE ORGANS:Hysterectomy.    BOWEL: Colonic diverticulosis without evidence of diverticulitis. No   bowel obstruction. Appendix is normal.  PERITONEUM: No ascites.  VESSELS: Atherosclerotic changes. Abdominal aorta normal in caliber.  RETROPERITONEUM/LYMPH NODES: Mildly prominent periportal lymph nodes   including 1.6 x 0.8 cm (series 3 image 139).  ABDOMINAL WALL: Fat-containing right inguinal hernia.  BONES: Degenerative changes in the spine.    IMPRESSION:    Region of low attenuation in the central aspect of the left hepatic lobe   measuring approximately 2.8 cm with mild biliary ductal dilatation in the   left hepatic lobe and left hepatic lobe atrophy; a pre and post IV   contrast MRI/MRCP of the abdomen is recommended to exclude an underlying   mass.    Mildly enlarged mediastinal and bilateral hilar lymph nodes.    < end of copied text >      --------------------------------------------------------------------------------------------

## 2022-01-14 NOTE — PROGRESS NOTE ADULT - ASSESSMENT
medical eval/ clearance for planned Brain bx next week if pt and daughter agree to proceed w further work-up  case and brian d/w Dr Charles at 17:05   PT/OT evaluated and recommend AR  PMnR eval placed   nutrition eval PND  will add pre-albumin to today's labs   ascorbic acid 500 milliGRAM(s) Oral daily  daily weight   Vitamin C      82 y/o F PMH HTN transferred [place] [facility] [place] & fall at home with +head strike. No LOC.   CTH at [facility] reveals Butterfly type hyperattenuating lesion involving the corpus callosum concerning for malignancy with DDx for glioblastoma or primary CNS lymphoma.  CT CAP reports Region of low attenuation in the central aspect of the left hepatic lobe measuring approximately 2.8 cm with mild biliary ductal dilatation in the left hepatic lobe and left hepatic lobe atrophy; a pre and post IV contrast MRI/MRCP of the abdomen is recommended to exclude an underlying mass. Mildly enlarged mediastinal and bilateral hilar lymph nodes.      medical eval/ clearance for planned Brain bx next week if pt and daughter agree to proceed w further work-up  Dr Frausto d/w pt and her daughter, Meghan via phone [phone]  case and brian d/w Dr Charles at 17:05   BP control/ medical management   PT/OT evaluated and recommend AR  PMnR eval placed/ PND   nutrition eval PND  will add pre-albumin to today's labs   ascorbic acid 500 milliGRAM(s) Oral daily  daily weight   Vitamin C   Q2h neuro checks  MRI brain +/- with brain lab protocol   MRI Abdomen w bay to eval Liver lesion   Keppra 500 mg BID for seizure PPX  SBP < 150; Labetalol/Hydralazine PRN  NS @50; may start regular diet after contrasted study  Pain control PRN  SCDs for DVT PPX; hold AC/AP agents at this time   ok for chemical DVT PPX at this time   [facility] protocol ordered  Trauma eval appreciated

## 2022-01-14 NOTE — CONSULT NOTE ADULT - ASSESSMENT
ASSESSMENT: Patient is a 81y old f presented s/p fall, no injuries identified on scans or on exam.    PLAN:    - No traumatic injuries identified on exam or on pan scan  - Cleared from trauma surgery standpoint  - Patient seen/examined with attending.  - Plan discussed with Attending, Dr. Sevilla

## 2022-01-15 NOTE — CONSULT NOTE ADULT - TIME BILLING
reviewed the  MRCP images    will discuss with Dr Morales  on monday if any utility for EUS  (lesion may be too proximal )

## 2022-01-15 NOTE — PROGRESS NOTE ADULT - ASSESSMENT
This is a 81 year old right hand dominant female with a past medical history significant for hypertension, transfer from [facility] status post fall, with an incidental finding of corpus callosum lesion.     1. Continue neuro checks   2. Encourage out of bed to chair  3. Pneumatic compression device to lower extremities   4. Tylenol for pain   5. No antiplatelets at this time   6. Patient will require medical clearance for OR on Wednesday   7. GI consult called for left hepatic lesion   8. Plan was discussed with Dr Antoine Berry

## 2022-01-15 NOTE — CONSULT NOTE ADULT - SUBJECTIVE AND OBJECTIVE BOX
Patient is a 81y old  Female who presents with a chief complaint of Brain mass (15 Larry 2022 14:30)      HPI:  Patient is a 81y old  Female who presents with a chief complaint of brain mass.The patient was not feeling for 2 weeks.  She was unsteady on her feet.  Came home from work and fell.  Not clear if she had on loss of consciousness.  Called EMS and went to [facility].  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  May be going to the OR on Wednesday.  Incidentally with the work-up she was noted to have a low-attenuation lesion in the left hepatic lobe which is 2.8 cm.  Mild ductal dilation.  MRI shows same.  Her LFTs are normal.  CBC is normal.  Patient has no abdominal pain nausea vomiting unintentional weight loss or jaundice.  No family or personal history of liver disease.    HPI: Patient is a 80 y/o F PMH HTN [place] [facility].    REVIEW OF SYSTEMS:  Constitutional: No fever, weight loss or fatigue  ENMT:  No difficulty hearing, tinnitus, vertigo; No sinus or throat pain  Respiratory: No cough, wheezing, chills or hemoptysis  Cardiovascular: No chest pain, palpitations, dizziness or leg swelling  Gastrointestinal: No abdominal or epigastric pain. No nausea, vomiting or hematemesis; No diarrhea or constipation. No melena or hematochezia.  Skin: No itching, burning, rashes or lesions   Musculoskeletal: No joint pain or swelling; No muscle, back or extremity pain    PAST MEDICAL & SURGICAL HISTORY:  HTN (hypertension)    No significant past surgical history        FAMILY HISTORY:  No pertinent family history in first degree relatives        SOCIAL HISTORY:  Smoking Status: [ ] Current, [ ] Former, [ ] Never  Pack Years:  [  ] EtOH-denies  [  ] IVDA    MEDICATIONS:  MEDICATIONS  (STANDING):  ascorbic acid 500 milliGRAM(s) Oral daily  enoxaparin Injectable 40 milliGRAM(s) SubCutaneous at bedtime  levETIRAcetam 500 milliGRAM(s) Oral every 12 hours  multivitamin 1 Tablet(s) Oral daily    MEDICATIONS  (PRN):  acetaminophen     Tablet .. 650 milliGRAM(s) Oral every 6 hours PRN Temp greater or equal to 38C (100.4F), Mild Pain (1 - 3)  hydrALAZINE Injectable 10 milliGRAM(s) IV Push every 2 hours PRN SBP > 150  labetalol Injectable 10 milliGRAM(s) IV Push every 2 hours PRN Systolic blood pressure > 150  ondansetron   Disintegrating Tablet 4 milliGRAM(s) Oral every 6 hours PRN Nausea      Allergies    Allergy Status Unknown    Intolerances        Vital Signs Last 24 Hrs  T(C): 36.9 (15 Larry 2022 16:00), Max: 36.9 (15 Larry 2022 16:00)  T(F): 98.5 (15 Larry 2022 16:00), Max: 98.5 (15 Larry 2022 16:00)  HR: 93 (15 Larry 2022 16:00) (67 - 93)  BP: 159/80 (15 Larry 2022 16:00) (114/53 - 159/80)  BP(mean): 99 (15 Larry 2022 16:00) (67 - 99)  RR: 16 (15 Larry 2022 16:00) (15 - 20)  SpO2: 97% (15 Larry 2022 16:00) (95% - 98%)    01-14 @ 07:01  -  01-15 @ 07:00  --------------------------------------------------------  IN: 220 mL / OUT: 575 mL / NET: -355 mL    01-15 @ 07:01  -  01-15 @ 17:20  --------------------------------------------------------  IN: 600 mL / OUT: 600 mL / NET: 0 mL          PHYSICAL EXAM:    General:  in no acute distress  HEENT: MMM, conjunctiva and sclera clear  H-RRR  L-CTA  Gastrointestinal: Soft, non-tender non-distended; Normal bowel sounds; No rebound or guarding  Extremities: Normal range of motion, No clubbing, cyanosis or edema  Neurological: Alert and oriented x3  Skin: Warm and dry. No obvious rash      LABS:                        13.9   6.53  )-----------( 270      ( 14 Jan 2022 07:06 )             40.8     14 Jan 2022 07:06    137    |  100    |  7.5    ----------------------------<  87     3.7     |  22.0   |  0.50     Ca    9.1        14 Jan 2022 07:06  Phos  3.9       14 Jan 2022 07:10  Mg     2.1       14 Jan 2022 07:06    TPro  6.1    /  Alb  4.2    /  TBili  0.7    /  DBili  0.2    /  AST  21     /  ALT  18     /  AlkPhos  101    / Amylase x      /Lipase x      14 Jan 2022 07:10              RADIOLOGY & ADDITIONAL STUDIES:     < from: MR Abdomen w/wo IV Cont (01.14.22 @ 19:55) >  MPRESSION:  Left hepatic lobe atrophy and mild intrahepatic dilatation involving   segments 2 and 3. Suspicion for stricturing mass at the junction of the   medial and lateral left lobes. 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      < end of copied text >

## 2022-01-15 NOTE — CONSULT NOTE ADULT - ASSESSMENT
80 y/o F PMH HTN transferred from [facility] s/p trip & fall at home with +head strike. No LOC. CTH at [facility] reveals Butterfly type hyperattenuating lesion involving the corpus callosum concerning for malignancy with glioblastoma and primary CNS lymphoma:         Plan:    Brain Mass: As per Neurosurgery team  -Q2h neuro checks  -CTA C/A/P done showed egion of low attenuation in the central aspect of the left hepatic lobe   measuring approximately 2.8 cm with mild biliary ductal dilatation in the   left hepatic lobe and left hepatic lobe atrophy; a pre and post IV contrast MRI/MRCP of the abdomen is recommended to exclude an underlying   mass.  Mildly enlarged mediastinal and bilateral hilar lymph nodes.  -MRI brain +/- with brain done showed: in addition to the corpus callosal infiltrative lesion, there are approximately 5 other lesions with associated edema and enhancement.   Vasogenic edema is most extensivein the left parietal region. Differential considerations, therefore, include the   possibility of metastatic disease versus lymphoma rather than glioblastoma. Further assessment and correlation recommended.  no evidence of arterial or venous occlusive disease.  -Keppra 500 mg BID for seizure PPX  -SBP < 150; Labetalol/Hydralazine PRN  -NS @ 50; may start regular diet after contrasted study  -Pain control PRN  -SCDs for DVT PPX; hold AC/AP agents at this time   -CIWA protocol ordered  thiamine, folic acid and multivitamins    Liver Lesion: MR of the liver done showed Left hepatic lobe atrophy and mild intrahepatic dilatation involving segments 2 and 3. Suspicion for stricturing mass at the junction of the medial and lateral left lobes. Findings are suspicious for primary biliary neoplasm, would recommand GI and IR consult, will order alpha Fetoprotein     HTN: will give PRN meds for now, will resume her home meds if blood pressure goes high    Hospitalist team is going to follow along

## 2022-01-15 NOTE — CONSULT NOTE ADULT - SUBJECTIVE AND OBJECTIVE BOX
81y old  Female  with Hx of HTN transferred from [facility] [place] & fall, Patient states she came home from work, didn't feel well and then fell and hit her head into her bathtubm Unsure if she syncopized. Denies LOC, she called EMS herself. she has been feeling off for 2 weeks but can't describe what was wrong with her, she denies having any Hx of cancer, denies visual disturbance, nausea/vomiting, fever/chills, weakness, numbness/tingling. C[place] at [facility] [place] Butterfly type hyperattenuating lesion involving the corpus callosum concerning for malignancy with glioblastoma and primary CNS lymphoma, she has been transferred here and admitted under Neurosurgery team  patient has no chest pain, sob, dizziness, she feel like she has been forgetting stuff.        REVIEW OF SYSTEMS:    CONSTITUTIONAL: No fever, some fatigue  RESPIRATORY: No cough, No shortness of breath  CARDIOVASCULAR: No chest pain, palpitations  GASTROINTESTINAL: No abdominal, No nausea, vomiting  NEUROLOGICAL: No headaches,  loss of strength.  MISCELLANEOUS: No joint swelling or pain        Allergies:  	Allergy Status Unknown:     Home Medications:   * Outpatient Medication Status not yet specified      PAST MEDICAL HISTORY:  HTN (hypertension).     PAST SURGICAL HISTORY:  No significant past surgical history.     FAMILY HISTORY:  No pertinent family history in first degree relatives. No pertinent family history of: cancer.     Social History:  Social History (marital status, living situation, occupation, tobacco use, alcohol and drug use, and sexual history): Lives at home alone.    Tobacco: Denies.  ETOH: Drinks 1-2 glasses of wine daily  Drugs: Denies.     Tobacco Screening:  · Core Measure Site	No    Risk Assessment:    Present on Admission:  Deep Venous Thrombosis	no  Pulmonary Embolus	no     Heart Failure:  Does this patient have a history of or has been diagnosed with heart failure? unknown.    Vital Signs Last 24 Hrs  T(C): 36.8 (15 Larry 2022 08:00), Max: 36.9 (2022 12:20)  T(F): 98.2 (15 Larry 2022 08:00), Max: 98.5 (2022 12:20)  HR: 81 (15 Larry 2022 10:00) (67 - 83)  BP: 145/50 (15 Larry 2022 10:00) (114/53 - 157/77)  BP(mean): 73 (15 Larry 2022 10:00) (67 - 84)  RR: 18 (15 Larry 2022 10:00) (15 - 20)  SpO2: 96% (15 Larry 2022 10:00) (95% - 97%)    PHYSICAL EXAM:    GENERAL: Elderly female looking comfortable   HEENT: PERRL, +EOMI  NECK: soft, Supple, No JVD,   CHEST/LUNG: Clear to auscultate bilaterally; No wheezing  HEART: S1S2+, Regular rate and rhythm; No murmurs  ABDOMEN: Soft, Nontender, Nondistended; Bowel sounds present  EXTREMITIES:  1+ Peripheral Pulses, No edema  SKIN: No rashes or lesions  NEURO: AAOX3, no facial asymmetry power 5/5   PSYCH: normal mood      22 CTH ([facility] [place] Butterfly type hyperattenuating lesion involving the corpus callosum concerning for malignancy with glioblastoma and primary CNS lymphoma as top differential diagnostic considerations. No acute intracranial hemorrhage, midline shift, or hydrocephalus. More sensitive evaluation with contrast-enhanced MRI is recommended, if no contraindications exist.          LABS:                        13.9   6.53  )-----------( 270      ( 2022 07:06 )             40.8     01-14    137  |  100  |  7.5<L>  ----------------------------<  87  3.7   |  22.0  |  0.50    Ca    9.1      2022 07:06  Phos  3.9     -14  Mg     2.1     -14    TPro  6.1<L>  /  Alb  4.2  /  TBili  0.7  /  DBili  0.2  /  AST  21  /  ALT  18  /  AlkPhos  101  01-14    PT/INR - ( 2022 07:06 )   PT: 12.2 sec;   INR: 1.05 ratio         PTT - ( 2022 07:06 )  PTT:35.8 sec  Urinalysis Basic - ( 15 Larry 2022 00:18 )    Color: Yellow / Appearance: Slightly Turbid / S.010 / pH: x  Gluc: x / Ketone: Negative  / Bili: Negative / Urobili: Negative mg/dL   Blood: x / Protein: 15 / Nitrite: Negative   Leuk Esterase: Moderate / RBC: 3-5 /HPF / WBC 3-5   Sq Epi: x / Non Sq Epi: Occasional / Bacteria: x

## 2022-01-15 NOTE — PROGRESS NOTE ADULT - SUBJECTIVE AND OBJECTIVE BOX
Neurosurgery RAJEEV  Daily note     This is a 81 year old right hand dominant female with a past medical history significant for hypertension, transfer from [facility] [place] status post trip & fall. Patient states she came home from work, didn't feel well and then fell and hit her head into her bathtub. Patient called EMS herself. Patient states she's been feeling "off" for 2 weeks but can't describe what was wrong with her. She noticed today that she felt unsteady on her feet. Currently denies headache, use of blood thinners, cancer history, visual disturbance, nausea/vomiting, fever/chills, weakness, numbness/tingling. 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 Patient was seen and examined, this morning.     Vital Signs Last 24 Hrs  T(C): 36.7 (15 Larry 2022 12:00), Max: 36.8 (15 Larry 2022 04:00)  T(F): 98 (15 Larry 2022 12:00), Max: 98.2 (15 Larry 2022 04:00)  HR: 79 (15 Larry 2022 12:00) (67 - 83)  BP: 155/76 (15 Larry 2022 12:00) (114/53 - 155/76)  BP(mean): 94 (15 Larry 2022 12:00) (67 - 94)  RR: 20 (15 Larry 2022 12:00) (15 - 20)  SpO2: 96% (15 Larry 2022 12:00) (95% - 97%)    PHYSICAL EXAM:  GENERAL: NAD, well-groomed, well-developed  HEAD:  Atraumatic, normocephalic  MENTAL STATUS: A A O x 3, Appropriately conversant, following simple commands  CRANIAL NERVES: PERRL. EOMI without nystagmus. Facial sensation intact V1-3 distribution b/l. Face symmetric w/ normal eye closure and smile, tongue midline. Hearing grossly intact. Speech clear. Head turning and shoulder shrug intact.   REFLEXES: No pronator drift  MOTOR: strength 5/5 b/l upper and lower extremities  SENSATION: grossly intact to light touch all extremities  CHEST/LUNG: Clear to auscultation bilaterally  HEART: +S1/+S2  ABDOMEN: Soft, nontender, nondistended  EXTREMITIES:  2+ peripheral pulses, no clubbing, cyanosis, or edema  SKIN: Warm, dry; no rashes or lesions    LABS:                        13.9   6.53  )-----------( 270      ( 2022 07:06 )             40.8         137  |  100  |  7.5<L>  ----------------------------<  87  3.7   |  22.0  |  0.50    Ca    9.1      2022 07:06  Phos  3.9       Mg     2.1         TPro  6.1<L>  /  Alb  4.2  /  TBili  0.7  /  DBili  0.2  /  AST  21  /  ALT  18  /  AlkPhos  101      PT/INR - ( 2022 07:06 )   PT: 12.2 sec;   INR: 1.05 ratio      PTT - ( 2022 07:06 )  PTT:35.8 sec  Urinalysis Basic - ( 15 Larry 2022 00:18 )    Color: Yellow / Appearance: Slightly Turbid / S.010 / pH: x  Gluc: x / Ketone: Negative  / Bili: Negative / Urobili: Negative mg/dL   Blood: x / Protein: 15 / Nitrite: Negative   Leuk Esterase: Moderate / RBC: 3-5 /HPF / WBC 3-5   Sq Epi: x / Non Sq Epi: Occasional / Bacteria: x     @ 07:01  -  01-15 @ 07:00  --------------------------------------------------------  IN: 220 mL / OUT: 575 mL / NET: -355 mL    01-15 @ 07:01  -  01-15 @ 14:31  --------------------------------------------------------  IN: 600 mL / OUT: 50 mL / NET: 550 mL      RADIOLOGY & ADDITIONAL TESTS:  ACC: [id number] EXAM:  MR ABDOMEN WAW IC                        PROCEDURE DATE:  2022    INTERPRETATION:  CLINICAL INFORMATION: Liver lesion. Rule out cancer.  COMPARISON: 2022    CONTRAST/COMPLICATIONS:  IV Contrast: Gadavist  5 cc administered   2.5 cc discarded  Oral Contrast: NONE  Complications: None reported at time of study completion    PROCEDURE:  MRI of the abdomen was performed.  MRCP was performed.    FINDINGS:  LOWER CHEST: Within normal limits.    LIVER/BILE DUCTS: Numerous liver cysts. Left lobe atrophy, most marked in   segment 4. Mild intrahepatic ductal dilatation involvement segments 2 and   3 with ill-defined abnormal enhancement at the junction of the medial and   lateral left lobe segments. Right hepatic and extrahepatic ducts are   normal in caliber.  GALLBLADDER: Within normal limits.  SPLEEN: Within normal limits.  PANCREAS: Within normal limits.  ADRENALS: Within normal limits.  KIDNEYS/URETERS: Bilateral renal cysts.    VISUALIZED PORTIONS:  BOWEL: Sigmoid diverticulosis.  PERITONEUM: No ascites.  VESSELS: Within normal limits.  RETROPERITONEUM/LYMPH NODES: No lymphadenopathy.  ABDOMINAL WALL: Within normal limits.  BONES: No suspicious lesion.    IMPRESSION:  Left hepatic lobe atrophy and mild intrahepatic dilatation involving   segments 2 and 3. Suspicion for stricturing mass at the junction of the   medial and lateral left lobes. 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      ACC: [id number] EXAM:  MR BRAIN WAW IC                        PROCEDURE DATE:  2022    INTERPRETATION:  INDICATION:    Brain mass on CT. Headaches.    TECHNIQUE:  Multiplanar brain imaging was conducted. T1, T2 and FLAIR   imaging was performed.  In addition, diffusion imaging, diffusion   coefficient assessment (ADC) and T2* was incorporated . Post contrast   imaging was performed. 7.5 cc Gadavist was administered.  COMPARISON EXAMINATION:  CT dated 2022.    FINDINGS:    As noted on CT, there is a large infiltrative, predominantly solid mass   in the splenium of the corpus callosum with greater growth towards the   left. The mass measures 3.1 cm in height, 3.4 cm in AP dimension, and 4.8   cm in transverse dimension. There is an area of central necrosis on the   left. There is surrounding vasogenic edema, which is most extensive in   the left parietal region.    There is a small focus of abnormality in the left the parietal cortex   with associated edema or enhancement. This lesion measures 8 to 9 mm.    Small lesions are also noted in the left frontal lobe anteriorly and   usually with approximately 5 lesions identified in addition to the corpus   callosum lesion.    A differential therefore includes metastatic disease and lymphoma rather   than a primary glioblastoma. However further workup and assessment is   recommended.    There are motion related artifacts, with no definitive posterior fossa   lesion identified. Vessels are patent. There is no evidence of venous   sinus thrombosis. No collections are identified.    IMPRESSION:  1)  in addition to the corpus callosal infiltrative lesion, there are   approximately 5 other lesions with associated edema and enhancement.   Vasogenic edema is most extensive in the left parietal region. See full   description above. Differential considerations, therefore, include the   possibility of metastatic disease versus lymphoma rather than   glioblastoma. Further assessment and correlation recommended.  2)  no evidence of arterial or venous occlusive disease.

## 2022-01-16 NOTE — DIETITIAN INITIAL EVALUATION ADULT. - ADD RECOMMEND
Add Ensure Enlive TID to optimize po intake and provide an additional 350kcal, 20g protein per serving.

## 2022-01-16 NOTE — PROGRESS NOTE ADULT - SUBJECTIVE AND OBJECTIVE BOX
Chief Complaint: This is a 81y old woman patient being seen in follow-up consultation for radiology finding or liver lesion.     Interval HPI / 24H events:  Patient seen and evaluated at bedside, reporting no complaints, no overnight events. MRI abdomen revealed left hepatic lobe atrophy and mild intrahepatic dilatation, suspicion for stricturing mass at the junction of the medial and lateral left lobes.  Patient tolerating diet. Denies nausea, vomiting, abdominal pain, chest pain, shortness of breath, hematemesis, hematochezia, melena.                                                          .     Review of Systems:  · ENMT: negative  · Respiratory and Thorax: negative  · Cardiovascular: negative  · Gastrointestinal: see above.  · Genitourinary:	negative  · Musculoskeletal: negative  · Neurological: negative  · Psychiatric: negative  · Hematology/Lymphatics: negative  · Endocrine: negative      PAST MEDICAL/SURGICAL HISTORY:  HTN (hypertension)    No significant past surgical history      MEDICATIONS  (STANDING):  ascorbic acid 500 milliGRAM(s) Oral daily  enoxaparin Injectable 40 milliGRAM(s) SubCutaneous at bedtime  levETIRAcetam 500 milliGRAM(s) Oral every 12 hours  multivitamin 1 Tablet(s) Oral daily    MEDICATIONS  (PRN):  acetaminophen     Tablet .. 650 milliGRAM(s) Oral every 6 hours PRN Temp greater or equal to 38C (100.4F), Mild Pain (1 - 3)  hydrALAZINE Injectable 10 milliGRAM(s) IV Push every 2 hours PRN SBP > 150  labetalol Injectable 10 milliGRAM(s) IV Push every 2 hours PRN Systolic blood pressure > 150  ondansetron   Disintegrating Tablet 4 milliGRAM(s) Oral every 6 hours PRN Nausea    Allergy Status Unknown    T(C): 36.7 (01-16-22 @ 08:24), Max: 36.9 (01-15-22 @ 16:00)  HR: 83 (01-16-22 @ 10:30) (65 - 93)  BP: 178/73 (01-16-22 @ 10:30) (120/55 - 178/73)  RR: 17 (01-16-22 @ 10:30) (14 - 17)  SpO2: 95% (01-16-22 @ 10:30) (95% - 100%)    I&O's Summary    15 Larry 2022 07:01  -  16 Jan 2022 07:00  --------------------------------------------------------  IN: 600 mL / OUT: 2000 mL / NET: -1400 mL    16 Jan 2022 07:01  -  16 Jan 2022 14:23  --------------------------------------------------------  IN: 0 mL / OUT: 550 mL / NET: -550 mL      PHYSICAL EXAM:  Constitutional: Thin appearing, in no acute distress  Neuro: Awake alert, oriented to person, place and situation, non-focal, speech clear and intact  HEENT: PERRL, anicteric sclerae  Neck: supple, no JVD  CV: +S1S2,   Pulm/chest: lung sounds clear bilaterally, no accessory muscle use noted  Abd: soft, NT, ND, +BS  Ext:  no Cyanosis, clubbing or edema  Skin: warm, well perfused, no jaundice   Psych: calm, appropriate affect      LABS:               13.9   5.64  )-----------( 285      ( 01-16 @ 07:16 )             41.6                13.9   6.53  )-----------( 270      ( 01-14 @ 07:06 )             40.8       01-16    135  |  100  |  9.4  ----------------------------<  128<H>  3.8   |  23.0  |  0.53    Ca    8.3<L>      16 Jan 2022 07:16    TPro  5.7<L>  /  Alb  3.9  /  TBili  0.4  /  DBili  0.1  /  AST  15  /  ALT  14  /  AlkPhos  96  01-16    LIVER FUNCTIONS - ( 16 Jan 2022 07:16 )  Alb: 3.9 g/dL / Pro: 5.7 g/dL / ALK PHOS: 96 U/L / ALT: 14 U/L / AST: 15 U/L / GGT: x             < from: MR Abdomen w/wo IV Cont (01.14.22 @ 19:55) >  FINDINGS:  LOWER CHEST: Within normal limits.    LIVER/BILE DUCTS: Numerous liver cysts. Left lobe atrophy, most marked in   segment 4. Mild intrahepatic ductal dilatation involvement segments 2 and   3 with ill-defined abnormal enhancement at the junction of the medial and   lateral left lobe segments. Right hepatic and extrahepatic ducts are   normal in caliber.  GALLBLADDER: Within normal limits.  SPLEEN: Within normal limits.  PANCREAS: Within normal limits.  ADRENALS: Within normal limits.  KIDNEYS/URETERS: Bilateral renal cysts.    VISUALIZED PORTIONS:  BOWEL: Sigmoid diverticulosis.  PERITONEUM: No ascites.  VESSELS: Within normal limits.  RETROPERITONEUM/LYMPH NODES: No lymphadenopathy.  ABDOMINAL WALL: Within normal limits.  BONES: No suspicious lesion.    IMPRESSION:  Left hepatic lobe atrophy and mild intrahepatic dilatation involving   segments 2 and 3. Suspicion for stricturing mass at the junction of the   medial and lateral left lobes. 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    < end of copied text >

## 2022-01-16 NOTE — PROGRESS NOTE ADULT - ASSESSMENT
This is a 81 year old right hand dominant female with a past medical history significant for hypertension, status post fall, with complaints of memory issues, imaging revealed a corpus callosal lesion.     1. Patient will need medical clearance for biopsy on Wednesday 1-  2. Continue neuro checks every 2 hours    3. Encourage out of bed to chair   4. Pneumatic compression device while in bed  5. No antiplatelets at this time   6. Tylenol for pain   7. Lovenox to be discontinued Tuesday morning   8. Plan was discussed with Dr Antoine Berry  This is a 81 year old right hand dominant female with a past medical history significant for hypertension, status post fall, with complaints of memory issues, imaging revealed a corpus callosal lesion.     1. Patient will need medical clearance for biopsy on Wednesday 1-  2. Continue neuro checks every 2 hours    3. Encourage out of bed to chair   4. Pneumatic compression device while in bed  5. No antiplatelets at this time   6. Tylenol for pain   7. Lovenox to be discontinued Tuesday morning   8. GI consult was appreciate   9. Plan was discussed with Dr Antoine Berry

## 2022-01-16 NOTE — PROGRESS NOTE ADULT - SUBJECTIVE AND OBJECTIVE BOX
TYSON BERNAL    [id number]    81y      Female    Patient is a 81y old  Female who presents with a chief complaint of corpus callosum tumor (2022 11:01)      INTERVAL HPI/OVERNIGHT EVENTS:    patient denies any complains except her memory problem and issues with ambulation, denies fever, chills, chest pain, sob    REVIEW OF SYSTEMS:      CONSTITUTIONAL: No fever, some fatigue  RESPIRATORY: No cough, No shortness of breath  CARDIOVASCULAR: No chest pain, palpitations  GASTROINTESTINAL: No abdominal, No nausea, vomiting  NEUROLOGICAL: No headaches,  loss of strength.  MISCELLANEOUS: No joint swelling or pain      Vital Signs Last 24 Hrs  T(C): 36.7 (2022 08:24), Max: 36.9 (15 Larry 2022 16:00)  T(F): 98.1 (2022 08:24), Max: 98.5 (15 Larry 2022 16:00)  HR: 83 (2022 10:30) (65 - 93)  BP: 178/73 (2022 10:30) (120/55 - 178/73)  BP(mean): 98 (2022 10:30) (80 - 99)  RR: 17 (2022 10:30) (14 - 17)  SpO2: 95% (2022 10:30) (95% - 100%)    PHYSICAL EXAM:  GENERAL: Elderly female looking comfortable   HEENT: PERRL, +EOMI  NECK: soft, Supple, No JVD,   CHEST/LUNG: Clear to auscultate bilaterally; No wheezing  HEART: S1S2+, Regular rate and rhythm; No murmurs  ABDOMEN: Soft, Nontender, Nondistended; Bowel sounds present  EXTREMITIES:  1+ Peripheral Pulses, No edema  SKIN: No rashes or lesions  NEURO: AAOX3, no facial asymmetry power 5/5   PSYCH: normal mood        LABS:                        13.9   5.64  )-----------( 285      ( 2022 07:16 )             41.6     01-16    135  |  100  |  9.4  ----------------------------<  128<H>  3.8   |  23.0  |  0.53    Ca    8.3<L>      2022 07:16    TPro  5.7<L>  /  Alb  3.9  /  TBili  0.4  /  DBili  0.1  /  AST  15  /  ALT  14  /  AlkPhos  96        Urinalysis Basic - ( 15 Larry 2022 00:18 )    Color: Yellow / Appearance: Slightly Turbid / S.010 / pH: x  Gluc: x / Ketone: Negative  / Bili: Negative / Urobili: Negative mg/dL   Blood: x / Protein: 15 / Nitrite: Negative   Leuk Esterase: Moderate / RBC: 3-5 /HPF / WBC 3-5   Sq Epi: x / Non Sq Epi: Occasional / Bacteria: x          I&O's Summary    15 Larry 2022 07:01  -  2022 07:00  --------------------------------------------------------  IN: 600 mL / OUT: 2000 mL / NET: -1400 mL    2022 07:01  -  2022 14:01  --------------------------------------------------------  IN: 0 mL / OUT: 550 mL / NET: -550 mL        MEDICATIONS  (STANDING):  ascorbic acid 500 milliGRAM(s) Oral daily  enoxaparin Injectable 40 milliGRAM(s) SubCutaneous at bedtime  levETIRAcetam 500 milliGRAM(s) Oral every 12 hours  multivitamin 1 Tablet(s) Oral daily    MEDICATIONS  (PRN):  acetaminophen     Tablet .. 650 milliGRAM(s) Oral every 6 hours PRN Temp greater or equal to 38C (100.4F), Mild Pain (1 - 3)  hydrALAZINE Injectable 10 milliGRAM(s) IV Push every 2 hours PRN SBP > 150  labetalol Injectable 10 milliGRAM(s) IV Push every 2 hours PRN Systolic blood pressure > 150  ondansetron   Disintegrating Tablet 4 milliGRAM(s) Oral every 6 hours PRN Nausea

## 2022-01-16 NOTE — PROGRESS NOTE ADULT - ASSESSMENT
82 y/o F PMH HTN transferred from [facility] [place] s/p trip & fall at home with +head strike. No LOC. 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  Mildly enlarged mediastinal and bilateral hilar lymph nodes.  -MRI brain +/- with brain done showed: in addition to the corpus callosal infiltrative lesion, there are approximately 5 other lesions with associated edema and enhancement.   Vasogenic edema is most extensivein the left parietal region. Differential considerations, therefore, include the   possibility of metastatic disease versus lymphoma rather than glioblastoma. Further assessment and correlation recommended.  no evidence of arterial or venous occlusive disease.  -Keppra 500 mg BID for seizure PPX  -SBP < 150; Labetalol/Hydralazine PRN  -NS @ 50; may start regular diet after contrasted study  -Pain control PRN  -SCDs for DVT PPX; hold AC/AP agents at this time   -CIWA protocol ordered, not scoring   thiamine, folic acid and multivitamins    Liver Lesion: MR of the liver done showed Left hepatic lobe atrophy and mild intrahepatic dilatation involving segments 2 and 3. Suspicion for stricturing mass at the junction of the medial and lateral left lobes. Findings are suspicious for primary biliary neoplasm, GI is following, might need IR consult, alpha Fetoprotein is pending, patient is optimized form the medicine point of view for Liver biopsy procedure.     HTN: will give PRN meds for now, will resume her home meds if blood pressure goes high    Hospitalist team is going to follow along     DVT prophylaxis: SCDs          80 y/o F PMH HTN transferred from [facility] [place] s/p trip & fall at home with +head strike. No LOC. 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  Mildly enlarged mediastinal and bilateral hilar lymph nodes.  -MRI brain +/- with brain done showed: in addition to the corpus callosal infiltrative lesion, there are approximately 5 other lesions with associated edema and enhancement.   Vasogenic edema is most extensivein the left parietal region. Differential considerations, therefore, include the   possibility of metastatic disease versus lymphoma rather than glioblastoma. Further assessment and correlation recommended.  no evidence of arterial or venous occlusive disease.  -Keppra 500 mg BID for seizure PPX  -SBP < 150; Labetalol/Hydralazine PRN  -NS @ 50; may start regular diet after contrasted study  -Pain control PRN  -SCDs for DVT PPX; hold AC/AP agents at this time   -CIWA protocol ordered, not scoring   thiamine, folic acid and multivitamins  Patient has no CKD, CHF or stroke, her labs and imaging reviewed, patient is optimized from the medicine point of view for brain Biopsy.     Liver Lesion: MR of the liver done showed Left hepatic lobe atrophy and mild intrahepatic dilatation involving segments 2 and 3. Suspicion for stricturing mass at the junction of the medial and lateral left lobes. Findings are suspicious for primary biliary neoplasm, GI is following, might need IR consult, alpha Fetoprotein is pending.     HTN: will give PRN meds for now, will resume her home meds if blood pressure goes high    Hospitalist team is going to follow along     DVT prophylaxis: SCDs

## 2022-01-16 NOTE — PROGRESS NOTE ADULT - PROBLEM SELECTOR PLAN 1
Etiology unknown , HCC vs cholangiocarcinoma. MRI abdomen revealed left hepatic lobe atrophy and mild intrahepatic dilatation, suspicion for stricturing mass at the junction of the medial and lateral left lobes.    AFP negative  Will review the MRI imaging with the radiologist.   Will discuss with Dr. Morales for possible EUS for further evaluation  Outpatient follow up with hepatologist Suraj at [facility] Likely HCC v. MRI abdomen revealed left hepatic lobe atrophy and mild intrahepatic dilatation, suspicion for stricturing mass at the junction of the medial and lateral left lobes.    AFP negative  Will review the MRI imaging with the radiologist.   Will discuss with Dr. Morales for possible EUS for further evaluation  Outpatient follow up with hepatologist Suraj at [facility]

## 2022-01-16 NOTE — PROGRESS NOTE ADULT - SUBJECTIVE AND OBJECTIVE BOX
Neurosurgery RAJEEV  Daily note     This is a 81 year old right hand dominant female with a past medical history significant for hypertension transferred from [facility] [place] and fall. Patient states she came home from work, didn't feel well and then fell and hit her head into her bathtub. Patient denies LOC. Patient called EMS herself. Patient states she's been feeling "off" for 2 weeks but can't describe what was wrong with her. She noticed today that she felt unsteady on her feet. Currently denies headache, use of blood thinners, cancer history, visual disturbance, nausea/vomiting, fever/chills, weakness, numbness/tingling. 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 Patient was seen and examined this morning.     INTERVAL HPI OVERNIGHT EVENTS:  This is a 81 year old right hand dominant female seen lying comfortably in bed. Tolerating diet. Patient denies headache, weakness, numbness, n/v/d, fevers, chills, chest pain, SOB.     Vital Signs Last 24 Hrs  T(C): 36.9 (2022 07:00), Max: 36.9 (15 Larry 2022 16:00)  T(F): 98.4 (2022 07:00), Max: 98.5 (15 Larry 2022 16:00)  HR: 74 (2022 07:00) (65 - 93)  BP: 146/76 (2022 07:00) (120/55 - 159/80)  BP(mean): 80 (15 Larry 2022 18:00) (73 - 99)  RR: 16 (2022 07:00) (14 - 20)  SpO2: 100% (2022 07:00) (95% - 100%)    PHYSICAL EXAM:  GENERAL: NAD, well-groomed, well-developed  HEAD:  Atraumatic, normocephalic  MENTAL STATUS: A A O x 3, Appropriately conversant, following simple commands   CRANIAL NERVES: PERRL. EOMI without nystagmus. Facial sensation intact V1-3 distribution b/l. Face symmetric w/ normal eye closure and smile, tongue midline. Hearing grossly intact. Speech clear. Head turning and shoulder shrug intact.   REFLEXES: No pronator drift  MOTOR: strength 5/5 b/l upper and lower extremities  SENSATION: grossly intact to light touch all extremities  CHEST/LUNG: Clear to auscultation bilaterally  HEART: +S1/+S2  ABDOMEN: Soft, nontender, nondistended  EXTREMITIES:  2+ peripheral pulses  SKIN: Warm, dry; no rashes or lesions    LABS:                        13.9   5.64  )-----------( 285      ( 2022 07:16 )             41.6         135  |  100  |  9.4  ----------------------------<  128<H>  3.8   |  23.0  |  0.53    Ca    8.3<L>      2022 07:16    TPro  5.7<L>  /  Alb  3.9  /  TBili  0.4  /  DBili  0.1  /  AST  15  /  ALT  14  /  AlkPhos  96        Urinalysis Basic - ( 15 Larry 2022 00:18 )    Color: Yellow / Appearance: Slightly Turbid / S.010 / pH: x  Gluc: x / Ketone: Negative  / Bili: Negative / Urobili: Negative mg/dL   Blood: x / Protein: 15 / Nitrite: Negative   Leuk Esterase: Moderate / RBC: 3-5 /HPF / WBC 3-5   Sq Epi: x / Non Sq Epi: Occasional / Bacteria: x    01-15 @ 07:01  -  16 @ 07:00  --------------------------------------------------------  IN: 600 mL / OUT: 2000 mL / NET: -1400 mL    RADIOLOGY & ADDITIONAL TESTS:  ACC: [id number] EXAM:  MR BRAIN WAW IC                        PROCEDURE DATE:  2022    INTERPRETATION:  INDICATION:    Brain mass on CT. Headaches.  TECHNIQUE:  Multiplanar brain imaging was conducted. T1, T2 and FLAIR   imaging was performed.  In addition, diffusion imaging, diffusion   coefficient assessment (ADC) and T2* was incorporated . Post contrast   imaging was performed. 7.5 cc Gadavist was administered.  COMPARISON EXAMINATION:  CT dated 2022.    FINDINGS:    As noted on CT, there is a large infiltrative, predominantly solid mass   in the splenium of the corpus callosum with greater growth towards the   left. The mass measures 3.1 cm in height, 3.4 cm in AP dimension, and 4.8   cm in transverse dimension. There is an area of central necrosis on the   left. There is surrounding vasogenic edema, which is most extensive in   the left parietal region.    There is a small focus of abnormality in the left the parietal cortex   with associated edema or enhancement. This lesion measures 8 to 9 mm.    Small lesions are also noted in the left frontal lobe anteriorly and   usually with approximately 5 lesions identified in addition to the corpus   callosum lesion.    A differential therefore includes metastatic disease and lymphoma rather   than a primary glioblastoma. However further workup and assessment is   recommended.    There are motion related artifacts, with no definitive posterior fossa   lesion identified. Vessels are patent. There is no evidence of venous   sinus thrombosis. No collections are identified.    IMPRESSION:    1)  in addition to the corpus callosal infiltrative lesion, there are   approximately 5 other lesions with associated edema and enhancement.   Vasogenic edema is most extensive in the left parietal region. See full   description above. Differential considerations, therefore, include the   possibility of metastatic disease versus lymphoma rather than   glioblastoma. Further assessment and correlation recommended.  2)  no evidence of arterial or venous occlusive disease.      ACC: [id number] EXAM:  MR ABDOMEN WAW IC                        PROCEDURE DATE:  2022   INTERPRETATION:  CLINICAL INFORMATION: Liver lesion. Rule out cancer.  COMPARISON: 2022  CONTRAST/COMPLICATIONS:  IV Contrast: Gadavist  5 cc administered   2.5 cc discarded  Oral Contrast: NONE  Complications: None reported at time of study completion  PROCEDURE:  MRI of the abdomen was performed.  MRCP was performed.    FINDINGS:  LOWER CHEST: Within normal limits.    LIVER/BILE DUCTS: Numerous liver cysts. Left lobe atrophy, most marked in   segment 4. Mild intrahepatic ductal dilatation involvement segments 2 and   3 with ill-defined abnormal enhancement at the junction of the medial and   lateral left lobe segments. Right hepatic and extrahepatic ducts are   normal in caliber.  GALLBLADDER: Within normal limits.  SPLEEN: Within normal limits.  PANCREAS: Within normal limits.  ADRENALS: Within normal limits.  KIDNEYS/URETERS: Bilateral renal cysts.    VISUALIZED PORTIONS:  BOWEL: Sigmoid diverticulosis.  PERITONEUM: No ascites.  VESSELS: Within normal limits.  RETROPERITONEUM/LYMPH NODES: No lymphadenopathy.  ABDOMINAL WALL: Within normal limits.  BONES: No suspicious lesion.    IMPRESSION:  Left hepatic lobe atrophy and mild intrahepatic dilatation involving   segments 2 and 3. Suspicion for stricturing mass at the junction of the   medial and lateral left lobes. 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

## 2022-01-16 NOTE — DIETITIAN INITIAL EVALUATION ADULT. - PERTINENT LABORATORY DATA
01-16 Na135 mmol/L Glu 128 mg/dL<H> K+ 3.8 mmol/L Cr  0.53 mg/dL BUN 9.4 mg/dL Phos n/a   Alb 3.9 g/dL PAB n/a

## 2022-01-16 NOTE — DIETITIAN INITIAL EVALUATION ADULT. - WEIGHT (KG)
Patient was calling as he has only 3 pills left. He is also asking for 90 day supply. Can we expedite.  [facility] [place]       59.8

## 2022-01-16 NOTE — DIETITIAN INITIAL EVALUATION ADULT. - OTHER INFO
82 y/o F PMH HTN [place] [facility] [place] at [place] with +head strike. No LOC. 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 Pt reported good po intake PTA and currently. Stated -137 lbs. Bedscale reading 132 lbs. Encouraged HBV protein with meals. Pt denied further nutrition questions/concerns. Last documented BM 1/13.

## 2022-01-17 NOTE — PROGRESS NOTE ADULT - SUBJECTIVE AND OBJECTIVE BOX
Chief Complaint: This is a 81y old woman patient being seen in follow-up consultation for radiology finding of liver lesion.     Interval HPI / 24H events:  Patient seen and evaluated at bedside, reporting no complaints, no overnight events. MRI abdomen revealed left hepatic lobe atrophy and mild intrahepatic dilatation, suspicion for stricturing mass at the junction of the medial and lateral left lobes.  Patient tolerating diet. Denies nausea, vomiting, abdominal pain, chest pain, shortness of breath, hematemesis, hematochezia, melena.                                                          .     Review of Systems:  · ENMT: negative  · Respiratory and Thorax: negative  · Cardiovascular: negative  · Gastrointestinal: see above.  · Genitourinary:	negative  · Musculoskeletal: negative  · Neurological: negative  · Psychiatric: negative  · Hematology/Lymphatics: negative  · Endocrine: negative    PAST MEDICAL/SURGICAL HISTORY:  HTN (hypertension)    No significant past surgical history      MEDICATIONS  (STANDING):  ascorbic acid 500 milliGRAM(s) Oral daily  enoxaparin Injectable 40 milliGRAM(s) SubCutaneous at bedtime  levETIRAcetam 500 milliGRAM(s) Oral every 12 hours  multivitamin 1 Tablet(s) Oral daily    MEDICATIONS  (PRN):  acetaminophen     Tablet .. 650 milliGRAM(s) Oral every 6 hours PRN Temp greater or equal to 38C (100.4F), Mild Pain (1 - 3)  hydrALAZINE Injectable 10 milliGRAM(s) IV Push every 2 hours PRN SBP > 150  labetalol Injectable 10 milliGRAM(s) IV Push every 2 hours PRN Systolic blood pressure > 150  ondansetron   Disintegrating Tablet 4 milliGRAM(s) Oral every 6 hours PRN Nausea  oxyCODONE    IR 5 milliGRAM(s) Oral every 6 hours PRN Severe Pain (7 - 10)    Bananas (Angioedema; Anaphylaxis; Short breath)  Drug Allergies Not Recorded  Kiwi (Angioedema; Anaphylaxis; Short breath)  Rock (Angioedema; Anaphylaxis; Short breath)  strawberry (Angioedema; Anaphylaxis; Short breath)    T(C): 37.1 (01-17-22 @ 10:00), Max: 37.1 (01-17-22 @ 06:02)  HR: 80 (01-17-22 @ 10:00) (66 - 87)  BP: 153/87 (01-17-22 @ 10:00) (120/97 - 164/76)  RR: 18 (01-17-22 @ 10:00) (13 - 19)  SpO2: 98% (01-17-22 @ 10:00) (95% - 100%) on room air    I&O's Summary    16 Jan 2022 07:01  -  17 Jan 2022 07:00  --------------------------------------------------------  IN: 240 mL / OUT: 2300 mL / NET: -2060 mL      PHYSICAL EXAM:  Constitutional: Thin appearing, in no acute distress  Neuro: Awake alert, oriented to person, place and situation, non-focal, speech clear and intact  HEENT: PERRL, anicteric sclerae  Neck: supple, no JVD  CV: +S1S2,   Pulm/chest: lung sounds clear bilaterally, no accessory muscle use noted  Abd: soft, NT, ND, +BS  Ext:  no Cyanosis, clubbing or edema  Skin: warm, well perfused, no jaundice   Psych: calm, appropriate affect    LABS:               13.9   5.64  )-----------( 285      ( 01-16 @ 07:16 )             41.6       01-16    135  |  100  |  9.4  ----------------------------<  128<H>  3.8   |  23.0  |  0.53    Ca    8.3<L>      16 Jan 2022 07:16    TPro  5.7<L>  /  Alb  3.9  /  TBili  0.4  /  DBili  0.1  /  AST  15  /  ALT  14  /  AlkPhos  96  01-16    LIVER FUNCTIONS - ( 16 Jan 2022 07:16 )  Alb: 3.9 g/dL / Pro: 5.7 g/dL / ALK PHOS: 96 U/L / ALT: 14 U/L / AST: 15 U/L / GGT: x             Culture - Urine (collected 15 Larry 2022 20:46)  Source: Clean Catch Clean Catch (Midstream)  Preliminary Report (17 Jan 2022 10:35):    10,000 - 49,000 CFU/mL Gram Negative Rods    50,000 - 99,000 CFU/mL Gram positive organisms    < from: MR Abdomen w/wo IV Cont (01.14.22 @ 19:55) >  FINDINGS:  LOWER CHEST: Within normal limits.    LIVER/BILE DUCTS: Numerous liver cysts. Left lobe atrophy, most marked in   segment 4. Mild intrahepatic ductal dilatation involvement segments 2 and   3 with ill-defined abnormal enhancement at the junction of the medial and   lateral left lobe segments. Right hepatic and extrahepatic ducts are   normal in caliber.  GALLBLADDER: Within normal limits.  SPLEEN: Within normal limits.  PANCREAS: Within normal limits.  ADRENALS: Within normal limits.  KIDNEYS/URETERS: Bilateral renal cysts.    VISUALIZED PORTIONS:  BOWEL: Sigmoid diverticulosis.  PERITONEUM: No ascites.  VESSELS: Within normal limits.  RETROPERITONEUM/LYMPH NODES: No lymphadenopathy.  ABDOMINAL WALL: Within normal limits.  BONES: No suspicious lesion.    IMPRESSION:  Left hepatic lobe atrophy and mild intrahepatic dilatation involving   segments 2 and 3. Suspicion for stricturing mass at the junction of the   medial and lateral left lobes. 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      < end of copied text >

## 2022-01-17 NOTE — PROGRESS NOTE ADULT - PROBLEM SELECTOR PLAN 1
Lesion is likely an HCC but I will need to review the images with radiology.   MRI abdomen reported as left hepatic lobe atrophy and mild intrahepatic dilatation, suspicion for stricturing mass at the junction of the medial and lateral left lobes.    AFP negative  LFTs and INR normal. No cirrhosis noted. Patient is optimized from a GI standpoint to have corpus callosum mass biopsied.  Will discuss with radiology on call today  Will consider EUS with Dr. Morales for further evaluation  Patient will need a hepatology follow-up at [address] with Dr. Salazar to evaluate if patient is a candidate for chemoembolization Lesion is likely an HCC but I will need to review the images with radiology.   MRI abdomen reported as left hepatic lobe atrophy and mild intrahepatic dilatation, suspicion for stricturing mass at the junction of the medial and lateral left lobes.    AFP negative  LFTs and INR normal. No cirrhosis noted. Patient is optimized from a GI standpoint to have corpus callosum mass biopsied.  Will discuss imaging with radiology to discuss next steps

## 2022-01-17 NOTE — PROGRESS NOTE ADULT - SUBJECTIVE AND OBJECTIVE BOX
HPI:  Patient is a 82 y/o female PMH HTN transferred from [facility] s/p trip & fall. Patient states she came home from work, didn't feel well and then fell and hit her head into her bathtub. Unsure if she syncopized. Denies LOC. Patient called EMS herself. Patient states she's been feeling "off" for 2 weeks but can't describe what was wrong with her. She noticed today that she felt unsteady on her feet. Currently denies headache, use of blood thinners, cancer history, visual disturbance, nausea/vomiting, fever/chills, weakness, numbness/tingling. 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   (13 Jan 2022 20:29)    INTERVAL HPI/OVERNIGHT EVENTS:  81y Female seen sitting comfortably in chair without complaints. Denies headache, weakness, numbness, n/v/d, fevers, chills, chest pain, SOB.     Vital Signs Last 24 Hrs  T(C): 37.1 (17 Jan 2022 10:00), Max: 37.1 (17 Jan 2022 06:02)  T(F): 98.7 (17 Jan 2022 10:00), Max: 98.7 (17 Jan 2022 06:02)  HR: 80 (17 Jan 2022 10:00) (66 - 87)  BP: 153/87 (17 Jan 2022 10:00) (120/97 - 164/76)  BP(mean): 86 (17 Jan 2022 08:00) (73 - 103)  RR: 18 (17 Jan 2022 10:00) (13 - 19)  SpO2: 98% (17 Jan 2022 10:00) (95% - 100%)    PHYSICAL EXAM:  GENERAL: NAD, well-groomed  HEAD:  Atraumatic, normocephalic  MENTAL STATUS: Awake; AAO x3; Opens eyes spontaneously. Appropriately conversant; following commands.  CRANIAL NERVES: PERRL. EOMI. Facial sensation intact V1-3 distribution b/l. Face symmetric w/ normal eye closure and smile, tongue midline.  MOTOR: strength 5/5 b/l upper and lower extremities  SENSATION: grossly intact to light touch all extremities  ABDOMEN: Soft, nontender, nondistended  EXTREMITIES:  2+ DP pulses  SKIN: Warm, dry; no rashes or lesions    LABS:                        13.9   5.64  )-----------( 285      ( 16 Jan 2022 07:16 )             41.6     01-16    135  |  100  |  9.4  ----------------------------<  128<H>  3.8   |  23.0  |  0.53    Ca    8.3<L>      16 Jan 2022 07:16    TPro  5.7<L>  /  Alb  3.9  /  TBili  0.4  /  DBili  0.1  /  AST  15  /  ALT  14  /  AlkPhos  96  01-16 01-16 @ 07:01 - 01-17 @ 07:00  --------------------------------------------------------  IN: 240 mL / OUT: 2300 mL / NET: -2060 mL    01-17 @ 07:01 - 01-17 @ 12:30  --------------------------------------------------------  IN: 360 mL / OUT: 0 mL / NET: 360 mL        RADIOLOGY & ADDITIONAL TESTS:    < from: MR Head w/wo IV Cont (01.14.22 @ 19:55) >  IMPRESSION:  1)  in addition to the corpus callosal infiltrative lesion, there are   approximately 5 other lesions with associated edema and enhancement.   Vasogenic edema is most extensivein the left parietal region. See full   description above. Differential considerations, therefore, include the   possibility of metastatic disease versus lymphoma rather than   glioblastoma. Further assessment and correlation recommended.  2)  no evidence of arterial or venous occlusive disease.      < from: MR Abdomen w/wo IV Cont (01.14.22 @ 19:55) >  IMPRESSION:  Left hepatic lobe atrophy and mild intrahepatic dilatation involving   segments 2 and 3. Suspicion for stricturing mass at the junction of the   medial and lateral left lobes. 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      --- End of Report ---    < end of copied text >

## 2022-01-17 NOTE — PROGRESS NOTE ADULT - ASSESSMENT
80 yo female with PMHx HTN who was [place] [facility] [place] with an incidental finding of corpus callosal lesion. Doing well without acute complaints. Plan for brain biopsy this Wednesday, 1/19.     Plan:    Neuro  - Brain biopsy Wednesday 1/19  - Optimized for OR, note from Dr. Rosenberg 1/16  - Continue neuro checks Q2  - Tylenol for pain as needed    CV  - Labetalol, Hydralazine    Respiratory  - RA    GI  - MR of liver suspicious for primary biliary neoplasm, GI following    - AFP within normal limits  - Pending MRCP      - Voiding    Heme  - SCDs   - Lovenox to be discontinued Tuesday morning   - Encourage out of bed to chair

## 2022-01-17 NOTE — PROGRESS NOTE ADULT - ASSESSMENT
80 y/o F PMH HTN transferred from [facility] [place] s/p trip & fall at home with +head strike. No LOC. 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  -MRI brain +/- with brain done showed: in addition to the corpus callosal infiltrative lesion, there are approximately 5 other lesions with associated edema and enhancement.   Vasogenic edema is most extensivein the left parietal region. Differential considerations, therefore, include the   possibility of metastatic disease versus lymphoma rather than glioblastoma. Further assessment and correlation recommended.  no evidence of arterial or venous occlusive disease.  -Keppra 500 mg BID for seizure PPX  -SBP < 150; Labetalol/Hydralazine PRN  -NS @ 50; may start regular diet after contrasted study  -Pain control PRN  -SCDs for DVT PPX; hold AC/AP agents at this time   -CIWA protocol ordered, not scoring   thiamine, folic acid and multivitamins  Patient has no CKD, CHF or stroke, her labs and imaging reviewed, patient is optimized from the medicine point of view for brain Biopsy.     Liver Lesion: MR of the liver done showed Left hepatic lobe atrophy and mild intrahepatic dilatation involving segments 2 and 3. Suspicion for stricturing mass at the junction of the medial and lateral left lobes. Findings are suspicious for primary biliary neoplasm, GI is following, might need IR consult, alpha Fetoprotein is negative, CEA is 1.6,  GI is going to discuss with advanced endoscopist and will further decided    HTN: will give PRN meds for now, will resume her home meds if blood pressure goes high    Hospitalist team is going to follow along     DVT prophylaxis: SCDs

## 2022-01-17 NOTE — PROGRESS NOTE ADULT - SUBJECTIVE AND OBJECTIVE BOX
TYSON BERNAL    [id number]    81y      Female    Patient is a 81y old  Female who presents with a chief complaint of Brain mass (17 Jan 2022 12:29)      INTERVAL HPI/OVERNIGHT EVENTS:    patient denies any complains except her memory problem and issues with ambulation as she can not keep up her balance, denies fever, chills, chest pain, sob.     REVIEW OF SYSTEMS:      CONSTITUTIONAL: No fever, some fatigue  RESPIRATORY: No cough, No shortness of breath  CARDIOVASCULAR: No chest pain, palpitations  GASTROINTESTINAL: No abdominal, No nausea, vomiting  NEUROLOGICAL: No headaches,  loss of strength.  MISCELLANEOUS: No joint swelling or pain       Vital Signs Last 24 Hrs  T(C): 36.8 (17 Jan 2022 12:00), Max: 37.1 (17 Jan 2022 06:02)  T(F): 98.3 (17 Jan 2022 12:00), Max: 98.7 (17 Jan 2022 06:02)  HR: 78 (17 Jan 2022 12:00) (66 - 87)  BP: 136/78 (17 Jan 2022 12:00) (120/97 - 164/76)  BP(mean): 86 (17 Jan 2022 08:00) (73 - 103)  RR: 18 (17 Jan 2022 10:00) (13 - 19)  SpO2: 99% (17 Jan 2022 12:00) (95% - 100%)    PHYSICAL EXAM:    GENERAL: Elderly female looking comfortable   HEENT: PERRL, +EOMI  NECK: soft, Supple, No JVD,   CHEST/LUNG: Clear to auscultate bilaterally; No wheezing  HEART: S1S2+, Regular rate and rhythm; No murmurs  ABDOMEN: Soft, Nontender, Nondistended; Bowel sounds present  EXTREMITIES:  1+ Peripheral Pulses, No edema  SKIN: No rashes or lesions  NEURO: AAOX3, no facial asymmetry power 5/5   PSYCH: normal mood      LABS:                        13.9   5.64  )-----------( 285      ( 16 Jan 2022 07:16 )             41.6     01-16    135  |  100  |  9.4  ----------------------------<  128<H>  3.8   |  23.0  |  0.53    Ca    8.3<L>      16 Jan 2022 07:16    TPro  5.7<L>  /  Alb  3.9  /  TBili  0.4  /  DBili  0.1  /  AST  15  /  ALT  14  /  AlkPhos  96  01-16            I&O's Summary    16 Jan 2022 07:01  -  17 Jan 2022 07:00  --------------------------------------------------------  IN: 240 mL / OUT: 2300 mL / NET: -2060 mL    17 Jan 2022 07:01  -  17 Jan 2022 13:55  --------------------------------------------------------  IN: 560 mL / OUT: 600 mL / NET: -40 mL        MEDICATIONS  (STANDING):  ascorbic acid 500 milliGRAM(s) Oral daily  enoxaparin Injectable 40 milliGRAM(s) SubCutaneous at bedtime  levETIRAcetam 500 milliGRAM(s) Oral every 12 hours  multivitamin 1 Tablet(s) Oral daily    MEDICATIONS  (PRN):  acetaminophen     Tablet .. 650 milliGRAM(s) Oral every 6 hours PRN Temp greater or equal to 38C (100.4F), Mild Pain (1 - 3)  hydrALAZINE Injectable 10 milliGRAM(s) IV Push every 2 hours PRN SBP > 150  labetalol Injectable 10 milliGRAM(s) IV Push every 2 hours PRN Systolic blood pressure > 150  ondansetron   Disintegrating Tablet 4 milliGRAM(s) Oral every 6 hours PRN Nausea  oxyCODONE    IR 5 milliGRAM(s) Oral every 6 hours PRN Severe Pain (7 - 10)

## 2022-01-18 PROBLEM — I10 ESSENTIAL (PRIMARY) HYPERTENSION: Chronic | Status: ACTIVE | Noted: 2022-01-01

## 2022-01-18 NOTE — CONSULT NOTE ADULT - SUBJECTIVE AND OBJECTIVE BOX
81yF was admitted on  from [facility] [place] after a fall and head injury with no LOC, also noting feelng off for 2 weeks. 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     Imaging performed:  MRI HEAD  - 1)  in addition to the corpus callosal infiltrative lesion, there are approximately 5 other lesions with associated edema and enhancement. Vasogenic edema is most extensive in the left parietal region. See full description above. Differential considerations, therefore, include the possibility of metastatic disease versus lymphoma rather than glioblastoma. Further assessment and correlation recommended.2)  no evidence of arterial or venous occlusive disease.    MRI ABD  - Left hepatic lobe atrophy and mild intrahepatic dilatation involving segments 2 and 3. Suspicion for stricturing mass at the junction of the medial and lateral left lobes. 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    Patient feels weak overall and anxious.   Has not been up much to ambulate and does not recall having worked with PT.     REVIEW OF SYSTEMS  Constitutional - No fever, No weight loss, No fatigue  HEENT - No eye pain, No visual disturbances, No difficulty hearing, No tinnitus, No vertigo, No neck pain  Respiratory - No cough, No wheezing, No shortness of breath  Cardiovascular - No chest pain, No palpitations  Gastrointestinal - No abdominal pain, No nausea, No vomiting, No diarrhea, No constipation  Genitourinary - No dysuria, No frequency, No hematuria, No incontinence  Neurological - No headaches, +memory loss, +loss of strength, No numbness, No tremors  Skin - No itching, No rashes, No lesions   Endocrine - No temperature intolerance  Musculoskeletal - No joint pain, No joint swelling, No muscle pain  Psychiatric - +depression, +anxiety    VITALS  T(C): 36.8 (22 @ 08:00), Max: 36.8 (22 @ 12:00)  HR: 71 (22 @ 08:00) (64 - 85)  BP: 150/68 (22 @ 08:00) (106/50 - 154/91)  RR: 18 (22 @ 08:00) (12 - 18)  SpO2: 99% (22 @ 08:00) (92% - 99%)  Wt(kg): --    PAST MEDICAL & SURGICAL HISTORY  HTN (hypertension)    No significant past surgical history        FUNCTIONAL HISTORY  Lives alone, 2+3 RYAN  Independent  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    CURRENT FUNCTIONAL STATUS   PT  Bed Mobility: Sit to Supine:     · Level of [place]	supervision  · Physical Assist/Nonphysical Assist	supervision    Bed Mobility: Supine to Sit:     · Level of [place]	supervision  · Physical Assist/Nonphysical Assist	supervision    Transfer: Sit to Stand:     · Level of [place]	minimum assist (75% patients effort)  · Physical Assist/Nonphysical Assist	1 person assist  · Weight-Bearing Restrictions	full weight-bearing  · Assistive Device	rolling walker    Transfer: Stand to Sit:     · Level of [place]	minimum assist (75% patients effort)  · Physical Assist/Nonphysical Assist	1 person assist  · Assistive Device	rolling walker    Gait Skills:     · Level of [place]	minimum assist (75% patients effort); moderate assist (50% patients effort)  · Physical Assist/Nonphysical Assist	1 person assist  · Weight-Bearing Restrictions	full weight-bearing  · Assistive Device	rolling walker  · Gait Distance	10 feet    Gait Analysis:     · Gait Deviations Noted	right knee intermittently buckling, requring blocking, decreased toe clearance right LE - both increasing with fatigue  · Impairments Contributing to Gait Deviations	impaired balance; decreased flexibility     OT  Bathing Training:     · Level of [place]	minimum assist (75% patients effort); moderate assist (50% patients effort)  · Physical Assist/Nonphysical Assist	1 person assist    Upper Body Dressing Training:     · Level of [place]	minimum assist (75% patients effort)  · Physical Assist/Nonphysical Assist	1 person assist    Lower Body Dressing Training:     · Level of [place]	minimum assist (75% patients effort); to don socks seated at the edge of the stretcher  · Physical Assist/Nonphysical Assist	1 person assist    Toilet Hygiene Training:     · Level of [place]	to be assessed    Grooming Training:     · Level of [place]	supervision  · Physical Assist/Nonphysical Assist	supervision    Eating/Self-Feeding Training:     · Level of [place]	independent    SOCIAL HISTORY - as per documentation/history  Smoking - None  EtOH - +  Drugs - None    FAMILY HISTORY   No pertinent family history in first degree relatives        RECENT LABS - Reviewed  CBC Full  -  ( 2022 07:30 )  WBC Count : 5.85 K/uL  RBC Count : 4.30 M/uL  Hemoglobin : 13.4 g/dL  Hematocrit : 39.6 %  Platelet Count - Automated : 286 K/uL  Mean Cell Volume : 92.1 fl  Mean Cell Hemoglobin : 31.2 pg  Mean Cell Hemoglobin Concentration : 33.8 gm/dL  Auto Neutrophil # : x  Auto Lymphocyte # : x  Auto Monocyte # : x  Auto Eosinophil # : x  Auto Basophil # : x  Auto Neutrophil % : x  Auto Lymphocyte % : x  Auto Monocyte % : x  Auto Eosinophil % : x  Auto Basophil % : x        134<L>  |  100  |  9.8  ----------------------------<  93  3.9   |  23.0  |  0.38<L>    Ca    8.7      2022 07:30  Phos  3.3       Mg     2.1           Urinalysis Basic - ( 2022 20:39 )    Color: Yellow / Appearance: Clear / S.010 / pH: x  Gluc: x / Ketone: Negative  / Bili: Negative / Urobili: Negative mg/dL   Blood: x / Protein: Negative / Nitrite: Negative   Leuk Esterase: Small / RBC: 0-2 /HPF / WBC 0-2   Sq Epi: x / Non Sq Epi: Occasional / Bacteria: x        ALLERGIES  Bananas (Angioedema; Anaphylaxis; Short breath)  Kiwi (Angioedema; Anaphylaxis; Short breath)  Rock (Angioedema; Anaphylaxis; Short breath)  No Known Drug Allergies  strawberry (Angioedema; Anaphylaxis; Short breath)      MEDICATIONS   acetaminophen     Tablet .. 650 milliGRAM(s) Oral every 6 hours PRN  ascorbic acid 500 milliGRAM(s) Oral daily  ceFAZolin   IVPB 2000 milliGRAM(s) IV Intermittent once  hydrALAZINE Injectable 10 milliGRAM(s) IV Push every 2 hours PRN  labetalol Injectable 10 milliGRAM(s) IV Push every 2 hours PRN  levETIRAcetam 500 milliGRAM(s) Oral every 12 hours  levETIRAcetam  IVPB 1000 milliGRAM(s) IV Intermittent once  multivitamin 1 Tablet(s) Oral daily  ondansetron   Disintegrating Tablet 4 milliGRAM(s) Oral every 6 hours PRN  oxyCODONE    IR 5 milliGRAM(s) Oral every 6 hours PRN      ----------------------------------------------------------------------------------------  PHYSICAL EXAM  Constitutional - NAD, Comfortable  HEENT - NCAT, EOMI  Neck - Supple, No limited ROM  Chest - Breathing comfortably, No wheezing  Cardiovascular - S1S2   Abdomen - Soft   Extremities - No C/C/E, No calf tenderness   Neurologic Exam -                    Cognitive - AAO to self, place, date, year, situation     Communication - Fluent, No dysarthria     Cranial Nerves - CN 2-12 intact     Motor - No focal deficits                    LEFT    UE - ShAB 5/5, EF 5/5, EE 5/5, WE 5/5,  5/5                    RIGHT UE - ShAB 5/5, EF 5/5, EE 5/5, WE 5/5,  5/5                    LEFT    LE - HF 5/5, KE 5/5, DF 5/5, PF 5/5                    RIGHT LE - HF 5/5, KE 5/5, DF 5/5, PF 5/5        Sensory - Intact to LT     Coordination - FTN intact  Psychiatric - Mood anxious  ----------------------------------------------------------------------------------------  ASSESSMENT/PLAN  81yFemale with functional deficits after 2 weeks of feeling off, now found to have a brain mass  Brain Mass - Keppra, Stereotactic Biopsy    HTN - Hydralazine, Labetalol  Pain - Tylenol, Oxycodone  DVT PPX - SCDs  Rehab - Pending stereotactic biopsy  and will need follow up therapy evals for functional assessment post-op to determine rehab needs.     Will continue to follow. Rehab recommendations are dependent on how functional progress changes as well as how patient continues to participate and tolerate therapeutic interventions, which may change. Recommend ongoing mobilization by staff to maintain cardiopulmonary function and prevention of secondary complications related to debility. Discussed with rehab team.

## 2022-01-18 NOTE — PROGRESS NOTE ADULT - SUBJECTIVE AND OBJECTIVE BOX
HPI:  Patient is a 82 y/o F with PMH HTN [place] [facility] [place]. Patient states she came home from work, didn't feel well and then fell and hit her head into her bathtub. Unsure if she syncopized. Denies LOC. Patient called EMS herself. Patient states she's been feeling "off" for 2 weeks but can't describe what was wrong with her. She noticed today that she felt unsteady on her feet. Currently denies headache, use of blood thinners, cancer history, visual disturbance, nausea/vomiting, fever/chills, weakness, numbness/tingling. 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    (2022 20:29)    INTERVAL HPI/OVERNIGHT EVENTS:  81y Female seen sitting comfortably in chair without complaints. Denies headache, weakness, numbness, n/v/d, fevers, chills, chest pain, SOB.       Vital Signs Last 24 Hrs  T(C): 36.8 (2022 08:00), Max: 36.8 (2022 12:00)  T(F): 98.3 (2022 08:00), Max: 98.3 (2022 12:00)  HR: 76 (2022 10:00) (64 - 85)  BP: 128/85 (2022 10:00) (106/50 - 154/91)  BP(mean): 85 (2022 08:00) (71 - 85)  RR: 18 (2022 08:00) (12 - 18)  SpO2: 98% (2022 10:00) (92% - 99%)    PHYSICAL EXAM:  GENERAL: NAD, well-groomed  HEAD:  Atraumatic, normocephalic  MENTAL STATUS: AAO x3; Awake; Opens eyes spontaneously; Appropriately conversant without aphasia; following simple commands  CRANIAL NERVES: PERRL. EOMI. Facial sensation intact V1-3 distribution b/l. Face symmetric w/ normal eye closure and smile, tongue midline.   MOTOR: strength 5/5 b/l upper and lower extremities  SENSATION: grossly intact to light touch all extremities  ABDOMEN: Soft, nontender, nondistended  EXTREMITIES:  2+ peripheral pulses  SKIN: Warm, dry; no rashes or lesions      LABS:                        13.4   5.85  )-----------( 286      ( 2022 07:30 )             39.6         134<L>  |  100  |  9.8  ----------------------------<  93  3.9   |  23.0  |  0.38<L>    Ca    8.7      2022 07:30  Phos  3.3       Mg     2.1         PT/INR - ( 2022 07:30 )   PT: 12.0 sec;   INR: 1.04 ratio       PTT - ( 2022 07:30 )  PTT:38.0 sec    Urinalysis Basic - ( 2022 20:39 )    Color: Yellow / Appearance: Clear / S.010 / pH: x  Gluc: x / Ketone: Negative  / Bili: Negative / Urobili: Negative mg/dL   Blood: x / Protein: Negative / Nitrite: Negative   Leuk Esterase: Small / RBC: 0-2 /HPF / WBC 0-2   Sq Epi: x / Non Sq Epi: Occasional / Bacteria: x     @ 07: @ 07:00  --------------------------------------------------------  IN: 810 mL / OUT: 1600 mL / NET: -790 mL     @ 07: @ 11:24  --------------------------------------------------------  IN: 350 mL / OUT: 0 mL / NET: 350 mL        RADIOLOGY & ADDITIONAL TESTS:    < from: MR Head w/wo IV Cont (22 @ 19:55) >    IMPRESSION:  1)  in addition to the corpus callosal infiltrative lesion, there are   approximately 5 other lesions with associated edema and enhancement.   Vasogenic edema is most extensivein the left parietal region. See full   description above. Differential considerations, therefore, include the   possibility of metastatic disease versus lymphoma rather than   glioblastoma. Further assessment and correlation recommended.  2)  no evidence of arterial or venous occlusive disease.    < from: MR Abdomen w/wo IV Cont (22 @ 19:55) >    IMPRESSION:  Left hepatic lobe atrophy and mild intrahepatic dilatation involving   segments 2 and 3. Suspicion for stricturing mass at the junction of the   medial and lateral left lobes. 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    < end of copied text >

## 2022-01-18 NOTE — PROGRESS NOTE ADULT - SUBJECTIVE AND OBJECTIVE BOX
TYSON BERNAL    [id number]    81y      Female    Patient is a 81y old  Female who presents with a chief complaint of Brain mass (2022 10:14)      INTERVAL HPI/OVERNIGHT EVENTS:    patient is doing ok, she has issues with ambulation as she can not keep up her balance, denies fever, chills, chest pain, sob.     REVIEW OF SYSTEMS:      CONSTITUTIONAL: No fever, some fatigue  RESPIRATORY: No cough, No shortness of breath  CARDIOVASCULAR: No chest pain, palpitations  GASTROINTESTINAL: No abdominal, No nausea, vomiting  NEUROLOGICAL: No headaches,  loss of strength.  MISCELLANEOUS: No joint swelling or pain       Vital Signs Last 24 Hrs  T(C): 36.8 (2022 08:00), Max: 36.8 (2022 12:00)  T(F): 98.3 (2022 08:00), Max: 98.3 (2022 12:00)  HR: 76 (2022 10:00) (64 - 85)  BP: 128/85 (2022 10:00) (106/50 - 154/91)  BP(mean): 85 (2022 08:00) (71 - 85)  RR: 18 (2022 08:00) (12 - 18)  SpO2: 98% (2022 10:00) (92% - 99%)    PHYSICAL EXAM:    GENERAL: Elderly female looking comfortable   HEENT: PERRL, +EOMI  NECK: soft, Supple, No JVD   CHEST/LUNG: Clear to auscultate bilaterally; No wheezing  HEART: S1S2+, Regular rate and rhythm; No murmurs  ABDOMEN: Soft, Nontender, Nondistended; Bowel sounds present  EXTREMITIES:  1+ Peripheral Pulses, No edema  SKIN: No rashes or lesions  NEURO: AAOX3, no facial asymmetry power 5/5   PSYCH: normal mood      LABS:                        13.4   5.85  )-----------( 286      ( 2022 07:30 )             39.6         134<L>  |  100  |  9.8  ----------------------------<  93  3.9   |  23.0  |  0.38<L>    Ca    8.7      2022 07:30  Phos  3.3       Mg     2.1           PT/INR - ( 2022 07:30 )   PT: 12.0 sec;   INR: 1.04 ratio         PTT - ( 2022 07:30 )  PTT:38.0 sec  Urinalysis Basic - ( 2022 20:39 )    Color: Yellow / Appearance: Clear / S.010 / pH: x  Gluc: x / Ketone: Negative  / Bili: Negative / Urobili: Negative mg/dL   Blood: x / Protein: Negative / Nitrite: Negative   Leuk Esterase: Small / RBC: 0-2 /HPF / WBC 0-2   Sq Epi: x / Non Sq Epi: Occasional / Bacteria: x          I&O's Summary    2022 07:01  -  2022 07:00  --------------------------------------------------------  IN: 810 mL / OUT: 1600 mL / NET: -790 mL    2022 07:01  -  2022 11:18  --------------------------------------------------------  IN: 350 mL / OUT: 0 mL / NET: 350 mL        MEDICATIONS  (STANDING):  ascorbic acid 500 milliGRAM(s) Oral daily  ceFAZolin   IVPB 2000 milliGRAM(s) IV Intermittent once  levETIRAcetam 500 milliGRAM(s) Oral every 12 hours  levETIRAcetam  IVPB 1000 milliGRAM(s) IV Intermittent once  multivitamin 1 Tablet(s) Oral daily    MEDICATIONS  (PRN):  acetaminophen     Tablet .. 650 milliGRAM(s) Oral every 6 hours PRN Temp greater or equal to 38C (100.4F), Mild Pain (1 - 3)  hydrALAZINE Injectable 10 milliGRAM(s) IV Push every 2 hours PRN SBP > 150  labetalol Injectable 10 milliGRAM(s) IV Push every 2 hours PRN Systolic blood pressure > 150  ondansetron   Disintegrating Tablet 4 milliGRAM(s) Oral every 6 hours PRN Nausea  oxyCODONE    IR 5 milliGRAM(s) Oral every 6 hours PRN Severe Pain (7 - 10)

## 2022-01-18 NOTE — PROGRESS NOTE ADULT - ASSESSMENT
81 year old female with PMHx HTN who was transferred from [facility] [place]/p fall with an incidental finding of corpus callosal lesion. Doing well without complaints. Plan for brain biopsy tomorrow, 1/19.     Plan:    Neuro  - Brain biopsy tomorrow was per Lisbet; optimized for OR; NPO after midnight  - MRI with brain lab protocol today, consent obtained from HCP  - Continue neurochecks Q2  - Pain medication PRN    CV  - Labetalol, Hydralazine    Respiratory  - RA    GI  - MR of liver suspicious for primary biliary neoplasm, GI following   - Needs IR consult for liver biopsy       - Voiding    Heme  - SCDs  - Lovenox discontinued  - Encourage out of bed to chair

## 2022-01-18 NOTE — PROGRESS NOTE ADULT - ASSESSMENT
82 y/o F PMH HTN transferred from [facility] [place] s/p trip & fall at home with +head strike. No LOC. 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  -MRI brain +/- with brain done showed: in addition to the corpus callosal infiltrative lesion, there are approximately 5 other lesions with associated edema and enhancement.   Vasogenic edema is most extensivein the left parietal region. Differential considerations, therefore, include the   possibility of metastatic disease versus lymphoma rather than glioblastoma. Further assessment and correlation recommended.  no evidence of arterial or venous occlusive disease.  -Keppra 500 mg BID for seizure PPX  -SBP < 150; Labetalol/Hydralazine PRN  -NS @ 50; may start regular diet after contrasted study  -Pain control PRN  -SCDs for DVT PPX; hold AC/AP agents at this time   -CIWA protocol ordered, not scoring   thiamine, folic acid and multivitamins  Patient has no CKD, CHF or stroke, her labs and imaging reviewed, patient is optimized from the medicine point of view for brain Biopsy.     Liver Lesion: MR of the liver done showed Left hepatic lobe atrophy and mild intrahepatic dilatation involving segments 2 and 3. Suspicion for stricturing mass at the junction of the medial and lateral left lobes. Findings are suspicious for primary biliary neoplasm, GI is following, might need IR consult, alpha Fetoprotein is negative, CEA is 1.6,  GI recommended IR consult for Guided biopsy.     HTN: will give PRN meds for now, will resume her home meds if blood pressure goes high      DVT prophylaxis: SCDs     Hospitalist team is singing off, please call as needed

## 2022-01-18 NOTE — PROGRESS NOTE ADULT - SUBJECTIVE AND OBJECTIVE BOX
Chief Complaint:  Patient is a 81y old  Female who presents with a chief complaint of Brain mass. Being seen in follow-up consultation for radiology finding of liver lesion.         Interval Events / Subjective: Patient seen and evaluated at bedside, reporting no complaints, no overnight events. Denies nausea, vomiting, abdominal pain, chest pain, shortness of breath, hematemesis, hematochezia, melena. Patient scheduled for possible biopsy tomorrow as MRI of abdomen revealed left hepatic lobe atrophy and mild intrahepatic dilatation, suspicion for stricturing mass at the junction of the medial and lateral left lobes.        REVIEW OF SYSTEMS:   General: Negative  HEENT: Negative  CV: Negative  Respiratory: Negative  GI: See HPI  : Negative  MSK: Negative  Hematologic: Negative  Skin: Negative    MEDICATIONS:   MEDICATIONS  (STANDING):  ascorbic acid 500 milliGRAM(s) Oral daily  ceFAZolin   IVPB 2000 milliGRAM(s) IV Intermittent once  levETIRAcetam 500 milliGRAM(s) Oral every 12 hours  levETIRAcetam  IVPB 1000 milliGRAM(s) IV Intermittent once  multivitamin 1 Tablet(s) Oral daily    MEDICATIONS  (PRN):  acetaminophen     Tablet .. 650 milliGRAM(s) Oral every 6 hours PRN Temp greater or equal to 38C (100.4F), Mild Pain (1 - 3)  hydrALAZINE Injectable 10 milliGRAM(s) IV Push every 2 hours PRN SBP > 150  labetalol Injectable 10 milliGRAM(s) IV Push every 2 hours PRN Systolic blood pressure > 150  ondansetron   Disintegrating Tablet 4 milliGRAM(s) Oral every 6 hours PRN Nausea  oxyCODONE    IR 5 milliGRAM(s) Oral every 6 hours PRN Severe Pain (7 - 10)      ALLERGIES:   Allergies    Bananas (Angioedema; Anaphylaxis; Short breath)  Kiwi (Angioedema; Anaphylaxis; Short breath)  Rock (Angioedema; Anaphylaxis; Short breath)  No Known Drug Allergies  strawberry (Angioedema; Anaphylaxis; Short breath)    Intolerances        VITAL SIGNS:   Vital Signs Last 24 Hrs  T(C): 36.8 (2022 08:00), Max: 36.8 (2022 12:00)  T(F): 98.3 (2022 08:00), Max: 98.3 (2022 12:00)  HR: 71 (2022 08:00) (64 - 85)  BP: 150/68 (2022 08:00) (106/50 - 154/91)  BP(mean): 85 (2022 08:00) (71 - 85)  RR: 18 (2022 08:00) (12 - 18)  SpO2: 99% (2022 08:00) (92% - 99%)  I&O's Summary    2022 07:01  -  2022 07:00  --------------------------------------------------------  IN: 810 mL / OUT: 1600 mL / NET: -790 mL        PHYSICAL EXAM:   GENERAL:  no acute distress  HEENT:  NC/AT,  conjunctivae clear, sclera -anicteric  CHEST:  Full & symmetric excursion, no increased effort, breath sounds clear  HEART:  Regular rhythm, S1, S2, no murmur/rub/S3/S4,  no edema  ABDOMEN:  Soft, non-tender, non-distended, normoactive bowel sounds,  no masses, no hepatosplenomegaly,   EXTREMITIES: No cyanosis, clubbing or edema  SKIN:  No rash/erythema/ecchymoses/petechiae/wounds/abscess/warm/dry  NEURO:  Alert, oriented    LABS:  CBC Full  -  ( 2022 07:30 )  WBC Count : 5.85 K/uL  RBC Count : 4.30 M/uL  Hemoglobin : 13.4 g/dL  Hematocrit : 39.6 %  Platelet Count - Automated : 286 K/uL  Mean Cell Volume : 92.1 fl  Mean Cell Hemoglobin : 31.2 pg  Mean Cell Hemoglobin Concentration : 33.8 gm/dL  Auto Neutrophil # : x  Auto Lymphocyte # : x  Auto Monocyte # : x  Auto Eosinophil # : x  Auto Basophil # : x  Auto Neutrophil % : x  Auto Lymphocyte % : x  Auto Monocyte % : x  Auto Eosinophil % : x  Auto Basophil % : x        134<L>  |  100  |  9.8  ----------------------------<  93  3.9   |  23.0  |  0.38<L>    Ca    8.7      2022 07:30  Phos  3.3     -18  Mg     2.1     -18        PT/INR - ( 2022 07:30 )   PT: 12.0 sec;   INR: 1.04 ratio         PTT - ( 2022 07:30 )  PTT:38.0 sec  Urinalysis Basic - ( 2022 20:39 )    Color: Yellow / Appearance: Clear / S.010 / pH: x  Gluc: x / Ketone: Negative  / Bili: Negative / Urobili: Negative mg/dL   Blood: x / Protein: Negative / Nitrite: Negative   Leuk Esterase: Small / RBC: 0-2 /HPF / WBC 0-2   Sq Epi: x / Non Sq Epi: Occasional / Bacteria: x        Culture - Urine (collected 15 Larry 2022 20:46)  Source: Clean Catch Clean Catch (Midstream)  Final Report (2022 16:51):    >=3 organisms. Probable collection contamination.        RADIOLOGY & ADDITIONAL STUDIES (The following images were personally reviewed):    ACC: [id number] EXAM:  MR ABDOMEN WAW IC                          PROCEDURE DATE:  2022          INTERPRETATION:  CLINICAL INFORMATION: Liver lesion. Rule out cancer.    COMPARISON: 2022    CONTRAST/COMPLICATIONS:  IV Contrast: Gadavist  5 cc administered   2.5 cc discarded  Oral Contrast: NONE  Complications: None reported at time of study completion    PROCEDURE:  MRI of the abdomen was performed.  MRCP was performed.    FINDINGS:  LOWER CHEST: Within normal limits.    LIVER/BILE DUCTS: Numerous liver cysts. Left lobe atrophy, most marked in   segment 4. Mild intrahepatic ductal dilatation involvement segments 2 and   3 with ill-defined abnormal enhancement at the junction of the medial and   lateral left lobe segments. Right hepatic and extrahepatic ducts are   normal in caliber.  GALLBLADDER: Within normal limits.  SPLEEN: Within normal limits.  PANCREAS: Within normal limits.  ADRENALS: Within normal limits.  KIDNEYS/URETERS: Bilateral renal cysts.    VISUALIZED PORTIONS:  BOWEL: Sigmoid diverticulosis.  PERITONEUM: No ascites.  VESSELS: Within normal limits.  RETROPERITONEUM/LYMPH NODES: No lymphadenopathy.  ABDOMINAL WALL: Within normal limits.  BONES: No suspicious lesion.    IMPRESSION:  Left hepatic lobe atrophy and mild intrahepatic dilatation involving   segments 2 and 3. Suspicion for stricturing mass at the junction of the   medial and lateral left lobes. 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        --- End of Report ---            JONELLE LAWLER MD; Attending Radiologist  This document has been electronically signed. 2022  8:17PM       Chief Complaint:  Patient is a 81y old  Female who presents with a chief complaint of Brain mass. Being seen in follow-up consultation for radiology finding of liver lesion.         Interval Events / Subjective: Patient seen and evaluated at bedside, reporting no complaints, no overnight events. Denies nausea, vomiting, abdominal pain, chest pain, shortness of breath, hematemesis, hematochezia, melena. Patient scheduled for brain mass  biopsy tomorrow. MRI of abdomen revealed left hepatic lobe atrophy and mild intrahepatic dilatation, suspicion for stricturing mass at the junction of the medial and lateral left lobes.        REVIEW OF SYSTEMS:   General: Negative  HEENT: Negative  CV: Negative  Respiratory: Negative  GI: See HPI  : Negative  MSK: Negative  Hematologic: Negative  Skin: Negative    MEDICATIONS:   MEDICATIONS  (STANDING):  ascorbic acid 500 milliGRAM(s) Oral daily  ceFAZolin   IVPB 2000 milliGRAM(s) IV Intermittent once  levETIRAcetam 500 milliGRAM(s) Oral every 12 hours  levETIRAcetam  IVPB 1000 milliGRAM(s) IV Intermittent once  multivitamin 1 Tablet(s) Oral daily    MEDICATIONS  (PRN):  acetaminophen     Tablet .. 650 milliGRAM(s) Oral every 6 hours PRN Temp greater or equal to 38C (100.4F), Mild Pain (1 - 3)  hydrALAZINE Injectable 10 milliGRAM(s) IV Push every 2 hours PRN SBP > 150  labetalol Injectable 10 milliGRAM(s) IV Push every 2 hours PRN Systolic blood pressure > 150  ondansetron   Disintegrating Tablet 4 milliGRAM(s) Oral every 6 hours PRN Nausea  oxyCODONE    IR 5 milliGRAM(s) Oral every 6 hours PRN Severe Pain (7 - 10)      ALLERGIES:   Allergies    Bananas (Angioedema; Anaphylaxis; Short breath)  Kiwi (Angioedema; Anaphylaxis; Short breath)  Rock (Angioedema; Anaphylaxis; Short breath)  No Known Drug Allergies  strawberry (Angioedema; Anaphylaxis; Short breath)    Intolerances        VITAL SIGNS:   Vital Signs Last 24 Hrs  T(C): 36.8 (2022 08:00), Max: 36.8 (2022 12:00)  T(F): 98.3 (2022 08:00), Max: 98.3 (2022 12:00)  HR: 71 (2022 08:00) (64 - 85)  BP: 150/68 (2022 08:00) (106/50 - 154/91)  BP(mean): 85 (2022 08:00) (71 - 85)  RR: 18 (2022 08:00) (12 - 18)  SpO2: 99% (2022 08:00) (92% - 99%)  I&O's Summary    2022 07:01  -  2022 07:00  --------------------------------------------------------  IN: 810 mL / OUT: 1600 mL / NET: -790 mL        PHYSICAL EXAM:   GENERAL:  no acute distress  HEENT:  NC/AT,  conjunctivae clear, sclera -anicteric  CHEST:  Full & symmetric excursion, no increased effort, breath sounds clear  HEART:  Regular rhythm, S1, S2, no murmur/rub/S3/S4,  no edema  ABDOMEN:  Soft, non-tender, non-distended, normoactive bowel sounds,  no masses, no hepatosplenomegaly,   EXTREMITIES: No cyanosis, clubbing or edema  SKIN:  No rash/erythema/ecchymoses/petechiae/wounds/abscess/warm/dry  NEURO:  Alert, oriented    LABS:  CBC Full  -  ( 2022 07:30 )  WBC Count : 5.85 K/uL  RBC Count : 4.30 M/uL  Hemoglobin : 13.4 g/dL  Hematocrit : 39.6 %  Platelet Count - Automated : 286 K/uL  Mean Cell Volume : 92.1 fl  Mean Cell Hemoglobin : 31.2 pg  Mean Cell Hemoglobin Concentration : 33.8 gm/dL  Auto Neutrophil # : x  Auto Lymphocyte # : x  Auto Monocyte # : x  Auto Eosinophil # : x  Auto Basophil # : x  Auto Neutrophil % : x  Auto Lymphocyte % : x  Auto Monocyte % : x  Auto Eosinophil % : x  Auto Basophil % : x        134<L>  |  100  |  9.8  ----------------------------<  93  3.9   |  23.0  |  0.38<L>    Ca    8.7      2022 07:30  Phos  3.3     -18  Mg     2.1     -18        PT/INR - ( 2022 07:30 )   PT: 12.0 sec;   INR: 1.04 ratio         PTT - ( 2022 07:30 )  PTT:38.0 sec  Urinalysis Basic - ( 2022 20:39 )    Color: Yellow / Appearance: Clear / S.010 / pH: x  Gluc: x / Ketone: Negative  / Bili: Negative / Urobili: Negative mg/dL   Blood: x / Protein: Negative / Nitrite: Negative   Leuk Esterase: Small / RBC: 0-2 /HPF / WBC 0-2   Sq Epi: x / Non Sq Epi: Occasional / Bacteria: x        Culture - Urine (collected 15 Larry 2022 20:46)  Source: Clean Catch Clean Catch (Midstream)  Final Report (2022 16:51):    >=3 organisms. Probable collection contamination.        RADIOLOGY & ADDITIONAL STUDIES (The following images were personally reviewed):    ACC: [id number] EXAM:  MR ABDOMEN WAW IC                          PROCEDURE DATE:  2022          INTERPRETATION:  CLINICAL INFORMATION: Liver lesion. Rule out cancer.    COMPARISON: 2022    CONTRAST/COMPLICATIONS:  IV Contrast: Gadavist  5 cc administered   2.5 cc discarded  Oral Contrast: NONE  Complications: None reported at time of study completion    PROCEDURE:  MRI of the abdomen was performed.  MRCP was performed.    FINDINGS:  LOWER CHEST: Within normal limits.    LIVER/BILE DUCTS: Numerous liver cysts. Left lobe atrophy, most marked in   segment 4. Mild intrahepatic ductal dilatation involvement segments 2 and   3 with ill-defined abnormal enhancement at the junction of the medial and   lateral left lobe segments. Right hepatic and extrahepatic ducts are   normal in caliber.  GALLBLADDER: Within normal limits.  SPLEEN: Within normal limits.  PANCREAS: Within normal limits.  ADRENALS: Within normal limits.  KIDNEYS/URETERS: Bilateral renal cysts.    VISUALIZED PORTIONS:  BOWEL: Sigmoid diverticulosis.  PERITONEUM: No ascites.  VESSELS: Within normal limits.  RETROPERITONEUM/LYMPH NODES: No lymphadenopathy.  ABDOMINAL WALL: Within normal limits.  BONES: No suspicious lesion.    IMPRESSION:  Left hepatic lobe atrophy and mild intrahepatic dilatation involving   segments 2 and 3. Suspicion for stricturing mass at the junction of the   medial and lateral left lobes. 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        --- End of Report ---            JONELLE LAWLER MD; Attending Radiologist  This document has been electronically signed. 2022  8:17PM

## 2022-01-18 NOTE — PROGRESS NOTE ADULT - PROBLEM SELECTOR PLAN 1
80 y/o F PMH HTN transferred from [facility] s/p trip & fall at home with +head strike. No LOC. CTH at 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 Reviewed CT, MRI shows Left hepatic lobe atrophy and mild intrahepatic dilatation involving segments 2 and 3. Suspicion for stricturing mass at the junction of the medial and lateral left lobes. 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 AFP & CEA negative.  LFTs and INR normal. No cirrhosis noted.     -Patient with possible cholangiocarcinoma?. Mass Not amenable by EUS guided biopsy. Will recommend IR consult for guided biopsy for further evaluation.    -Continue to trend LFTs.

## 2022-01-19 NOTE — CONSULT NOTE ADULT - SUBJECTIVE AND OBJECTIVE BOX
[facility]                                     [facility] [place]                                 [facility]                                  [address]                                        [place], [place], [address]                                             [phone]    CC: acute onset rght weakness, left eye deviation, decreased responsiveness  HPI:  The patient is a 81y Female who presented with CNS malignancy, s/p biopsy today this morning.  Had normal assess ment at 1300.  Just before 1400 she was found to have right facial weakness, right neglect and left gaze deviation.  Code stroke called and I repsonded.  Upon my examination she was also aphasic, right hemiparetix and had right HH vs profound neglect.      PAST MEDICAL & SURGICAL HISTORY:  HTN (hypertension)    No significant past surgical history        MEDICATIONS  (STANDING):  ceFAZolin   IVPB 2000 milliGRAM(s) IV Intermittent once  dexAMETHasone  Injectable 4 milliGRAM(s) IV Push every 6 hours  dexAMETHasone  IVPB 10 milliGRAM(s) IV Intermittent once  dextrose 40% Gel 15 Gram(s) Oral once  dextrose 5%. 1000 milliLiter(s) (50 mL/Hr) IV Continuous <Continuous>  dextrose 50% Injectable 25 Gram(s) IV Push once  glucagon  Injectable 1 milliGRAM(s) IntraMuscular once  insulin lispro (ADMELOG) corrective regimen sliding scale   SubCutaneous three times a day before meals  levETIRAcetam  IVPB 1000 milliGRAM(s) IV Intermittent once  levETIRAcetam  IVPB 500 milliGRAM(s) IV Intermittent every 12 hours  niCARdipine Infusion 5 mG/Hr (25 mL/Hr) IV Continuous <Continuous>  pantoprazole  Injectable 40 milliGRAM(s) IV Push daily  sodium chloride 0.9%. 1000 milliLiter(s) (60 mL/Hr) IV Continuous <Continuous>    MEDICATIONS  (PRN):  hydrALAZINE Injectable 10 milliGRAM(s) IV Push every 2 hours PRN SBP > 150  labetalol Injectable 10 milliGRAM(s) IV Push every 2 hours PRN Systolic blood pressure > 150  ondansetron Injectable 4 milliGRAM(s) IV Push every 6 hours PRN Nausea and/or Vomiting      Allergies    Bananas (Angioedema; Anaphylaxis; Short breath)  Kiwi (Angioedema; Anaphylaxis; Short breath)  latex (Unknown)  Rock (Angioedema; Anaphylaxis; Short breath)  No Known Drug Allergies  strawberry (Angioedema; Anaphylaxis; Short breath)    Intolerances        SOCIAL HISTORY:  unable to assess    FAMILY HISTORY:  Unable to assess          ROS: 14 point ROS negative other than what is present in HPI or below    Vital Signs Last 24 Hrs  T(C): 36.9 (19 Jan 2022 12:00), Max: 36.9 (19 Jan 2022 12:00)  T(F): 98.4 (19 Jan 2022 12:00), Max: 98.4 (19 Jan 2022 12:00)  HR: 83 (19 Jan 2022 14:00) (56 - 91)  BP: 142/63 (19 Jan 2022 13:45) (100/59 - 158/80)  BP(mean): 85 (19 Jan 2022 13:45) (70 - 106)  RR: 19 (19 Jan 2022 14:00) (12 - 24)  SpO2: 96% (19 Jan 2022 14:00) (84% - 98%)      General: NAD    Detailed Neurologic Exam:    Mental status: The patient is awake and alert and has global aphasia    Cranial nerves: Pupils equal and react symmetrically to light. There is no visual field deficit to confrontation. Extraocular motion is limited to right with dolls eyes maneuvers.  There is no ptosis. Facial sensation is intact. Facial musculature is asymmetric, central right facial weakness.     Motor: There is normal bulk and tone.  There is no tremor.  Unable to assess formally, but she has preserved tone and can hold left arm up when raised by me, right arm falls to the bed immediately    Sensation: reduced grimace/verbalization of pain when right side is pinched compared to the left pin    Reflexes: muted throughout and plantar responses are silent.    Cerebellar: unable to assess  dysmetria on finger to nose testing.    Gait : deferred    [facility] SS:  DATE: 1/19/2022  TIME: 1420  1A: Level of consciousness (0-3): 1  1B: Questions (0-2): 2  1C: Commands (0-2): 2  2: Gaze (0-2): 1  3: Visual fields (0-3): 1  4: Facial palsy (0-3): 2  MOTOR:  5A: Left arm motor drift (0-4): 2  5B: Right arm motor drift (0-4): 4  6A: Left leg motor drift (0-4): 4  6B: Right leg motor drift (0-4): 4  7: Limb ataxia (0-2): 0  SENSORY:  8: Sensation (0-2): 1  SPEECH:  9: Language (0-3): 3  10: Dysarthria (0-2): 1  EXTINCTION:  11: Extinction/inattention (0-2): 1    TOTAL SCORE: 29        LABS:                         14.3   6.46  )-----------( 270      ( 19 Jan 2022 05:31 )             41.4       01-19    135  |  101  |  14.1  ----------------------------<  102<H>  3.8   |  22.0  |  0.40<L>    Ca    8.9      19 Jan 2022 05:31  Phos  3.9     01-19  Mg     2.0     01-19        PT/INR - ( 19 Jan 2022 05:31 )   PT: 11.3 sec;   INR: 0.97 ratio         PTT - ( 19 Jan 2022 05:31 )  PTT:35.4 sec    RADIOLOGY & ADDITIONAL STUDIES (independently reviewed unless otherwise noted):  CT head: left parietal ICH, runs along biopsy tract.  There is no CVA.  (+) callosal mass

## 2022-01-19 NOTE — PROGRESS NOTE ADULT - SUBJECTIVE AND OBJECTIVE BOX
HPI:  Patient is a 80 y/o F PMH HTN [place] [facility] [place]. Patient states she came home from work, didn't feel well and then fell and hit her head into her bathtub. Unsure if she syncopized. Denies LOC. Patient called EMS herself. Patient states she's been feeling "off" for 2 weeks but can't describe what was wrong with her. She noticed today that she felt unsteady on her feet. Currently denies headache, use of blood thinners, cancer history, visual disturbance, nausea/vomiting, fever/chills, weakness, numbness/tingling. 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  (2022 20:29)    INTERVAL HPI:  81y Female POD#0 s/p left craniotomy for brain biopsy. Patient seen lying comfortably in bed. Orozco in with __ clear urine. Denies headache, weakness, numbness, n/v/d, fevers, chills, chest pain, SOB.     Vital Signs Last 24 Hrs  T(C): 36.9 (2022 12:00), Max: 36.9 (2022 12:00)  T(F): 98.4 (2022 12:00), Max: 98.4 (2022 12:00)  HR: 91 (2022 12:30) (62 - 91)  BP: 153/71 (2022 12:30) (100/59 - 153/71)  BP(mean): 94 (2022 12:30) (70 - 94)  RR: 16 (2022 12:30) (12 - 22)  SpO2: 94% (2022 12:30) (92% - 98%)    PHYSICAL EXAM:  GENERAL: NAD, well-groomed  HEAD:  Atraumatic, normocephalic  DRAINS:   WOUND: Dressing clean dry intact  MALLY COMA SCORE: E- V- M- =       E: 4= opens eyes spontaneously 3= to voice 2= to noxious 1= no opening       V: 5= oriented 4= confused 3= inappropriate words 2= incomprehensible sounds 1= nonverbal 1T= intubated       M: 6= follows commands 5= localizes 4= withdraws 3= flexor posturing 2= extensor posturing 1= no movement  MENTAL STATUS: AAO x3; Awake/Comatose; Opens eyes spontaneously/to voice/to light touch/to noxious stimuli; Appropriately conversant without aphasia/Nonverbal; following simple commands/mimicking/not following commands  CRANIAL NERVES: Visual acuity normal for age, visual fields full to confrontation, PERRL. EOMI without nystagmus. Facial sensation intact V1-3 distribution b/l. Face symmetric w/ normal eye closure and smile, tongue midline. Hearing grossly intact. Speech clear. Head turning and shoulder shrug intact.   REFLEXES: PERRL. Corneals intact b/l. Gag intact. Cough intact. Oculocephalic reflex intact (Doll's eye). Negative García's b/l. Negative clonus b/l  MOTOR: strength 5/5 b/l upper and lower extremities  Uppers     Delt (C5/6)     Bicep (C5/6)     Wrist Extend (C6)     Tricep (C7)     HG (C8/T1)  R                     5/5                 5/5                         5/5                           5/5                   5/5  L                      5/5                 5/5                         5/5                           5/5                   5/5  Lowers      HF(L1/L2)     KE (L3)     DF (L4)     EHL (L5)     PF (S1)      R                     5/5              5/5           5/5           5/5            5/5  L                     5/5               5/5          5/5            5/5            5/5  SENSATION: grossly intact to light touch all extremities  COORDINATION: Gait intact; rapid alternating movements intact; heel to shin intact; no upper extremity dysmetria  CHEST/LUNG: Clear to auscultation bilaterally; no rales, rhonchi, wheezing, or rubs  HEART: +S1/+S2; Regular rate and rhythm; no murmurs, rubs, or gallops  ABDOMEN: Soft, nontender, nondistended; bowel sounds present all four quadrants  EXTREMITIES:  2+ peripheral pulses, no clubbing, cyanosis, or edema  SKIN: Warm, dry; no rashes or lesions    LABS:                        14.3   6.46  )-----------( 270      ( 2022 05:31 )             41.4         135  |  101  |  14.1  ----------------------------<  102<H>  3.8   |  22.0  |  0.40<L>    Ca    8.9      2022 05:31  Phos  3.9       Mg     2.0           PT/INR - ( 2022 05:31 )   PT: 11.3 sec;   INR: 0.97 ratio         PTT - ( 2022 05:31 )  PTT:35.4 sec  Urinalysis Basic - ( 2022 20:39 )    Color: Yellow / Appearance: Clear / S.010 / pH: x  Gluc: x / Ketone: Negative  / Bili: Negative / Urobili: Negative mg/dL   Blood: x / Protein: Negative / Nitrite: Negative   Leuk Esterase: Small / RBC: 0-2 /HPF / WBC 0-2   Sq Epi: x / Non Sq Epi: Occasional / Bacteria: x         @ 07:01  -   @ 07:00  --------------------------------------------------------  IN: 1300 mL / OUT: 2050 mL / NET: -750 mL        RADIOLOGY & ADDITIONAL TESTS:   HPI:  Patient is a 80 y/o F PMH HTN [place] [facility] [place]. Patient states she came home from work, didn't feel well and then fell and hit her head into her bathtub. Unsure if she syncopized. Denies LOC. Patient called EMS herself. Patient states she's been feeling "off" for 2 weeks but can't describe what was wrong with her. She noticed today that she felt unsteady on her feet. Currently denies headache, use of blood thinners, cancer history, visual disturbance, nausea/vomiting, fever/chills, weakness, numbness/tingling. 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  (2022 20:29)    INTERVAL HPI:  81y Female POD#0 s/p left craniotomy for brain biopsy. Patient seen lying comfortably in bed.       Vital Signs Last 24 Hrs  T(C): 36.9 (2022 12:00), Max: 36.9 (2022 12:00)  T(F): 98.4 (2022 12:00), Max: 98.4 (2022 12:00)  HR: 91 (2022 12:30) (62 - 91)  BP: 153/71 (2022 12:30) (100/59 - 153/71)  BP(mean): 94 (2022 12:30) (70 - 94)  RR: 16 (2022 12:30) (12 - 22)  SpO2: 94% (2022 12:30) (92% - 98%)    PHYSICAL EXAM:  GENERAL: NAD, well-groomed, well developed female  HEAD:  Atraumatic, normocephalic  DRAINS: Left Subgaleal ANGELA  WOUND: Dressing clean dry intact  NEURO: Awake, alert, extubated. EOMI without nystagmus. Face symmetrical. Speech soft. Following simple commands. Moving left side spontaneously, moves right upper intermittently, minimal movement of right lower      LABS:                        14.3   6.46  )-----------( 270      ( 2022 05:31 )             41.4     -    135  |  101  |  14.1  ----------------------------<  102<H>  3.8   |  22.0  |  0.40<L>    Ca    8.9      2022 05:31  Phos  3.9       Mg     2.0           PT/INR - ( 2022 05:31 )   PT: 11.3 sec;   INR: 0.97 ratio         PTT - ( 2022 05:31 )  PTT:35.4 sec  Urinalysis Basic - ( 2022 20:39 )    Color: Yellow / Appearance: Clear / S.010 / pH: x  Gluc: x / Ketone: Negative  / Bili: Negative / Urobili: Negative mg/dL   Blood: x / Protein: Negative / Nitrite: Negative   Leuk Esterase: Small / RBC: 0-2 /HPF / WBC 0-2   Sq Epi: x / Non Sq Epi: Occasional / Bacteria: x         @ 07:01  -   @ 07:00  --------------------------------------------------------  IN: 1300 mL / OUT: 2050 mL / NET: -750 mL        RADIOLOGY & ADDITIONAL TESTS:

## 2022-01-19 NOTE — CONSULT NOTE ADULT - SUBJECTIVE AND OBJECTIVE BOX
HPI: Patient is a 80 y/o F PMH HTN transferred from [facility] [place] & fall. Patient states she came home from work, didn't feel well and then fell and hit her head into her bathtub. Unsure if she syncopized. Denies LOC. Patient called EMS herself. Patient states she's been feeling "off" for 2 weeks but can't describe what was wrong with her. She noticed today that she felt unsteady on her feet. Currently denies headache, use of blood thinners, cancer history, visual disturbance, nausea/vomiting, fever/chills, weakness, numbness/tingling. 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   (2022 20:29)  Patient admitted to neurosurgery service on stepdown unit 21. MRI brain  showed corpus callosal infiltrative lesion, as well as 5 additional lesions with edema and enhancement. MRI ABD  also performed after CT findings concerning for hepatic lesion, revealed L hepatic lobe atrophy and mild intrahepatic dilatation involving segments 2 and 3. Suspicion for stricturing mass at the junction of the medial and lateral left lobes. 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   Patient now POD#0 s/p L craniotomy for brain tumor biopsy. Subgaleal ANGELA drain placed. Patient extubated in OR and transferred to NSICU for further management and observation. Post-operatively, developed R sided weakness, aphasia, L gaze preference, R facial droop for which code stroke was called. STAT CTH obtained which revealed 3.4 x 5.1 cm acute hemorrhage along the biopsy tract in the L parietal lobe extending to the mass within the posterior corpus callosum.       PAST MEDICAL & SURGICAL HISTORY:  HTN (hypertension)  No significant past surgical history    FAMILY HISTORY:  No pertinent family history in first degree relatives    SOCIAL HISTORY:  Tobacco Use: Denies  EtOH use: Drinks 1-2 glasses of wine daily   Substance: Denies    Allergies    Bananas (Angioedema; Anaphylaxis; Short breath)  Kiwi (Angioedema; Anaphylaxis; Short breath)  latex (Unknown)  Rock (Angioedema; Anaphylaxis; Short breath)  No Known Drug Allergies  strawberry (Angioedema; Anaphylaxis; Short breath)      HOME MEDICATIONS:  Home Medications:  valsartan-hydrochlorothiazide 80 mg-12.5 mg oral tablet: 1 tab(s) orally once a day (2022 14:10)      MEDICATIONS:  Antibiotics:  ceFAZolin   IVPB 2000 milliGRAM(s) IV Intermittent once    Neuro:  haloperidol    Injectable 2.5 milliGRAM(s) IV Push once  levETIRAcetam  IVPB 1000 milliGRAM(s) IV Intermittent once  levETIRAcetam  IVPB 500 milliGRAM(s) IV Intermittent every 12 hours  ondansetron Injectable 4 milliGRAM(s) IV Push every 6 hours PRN    Anticoagulation:    OTHER:  dexAMETHasone  Injectable 4 milliGRAM(s) IV Push every 6 hours  dextrose 40% Gel 15 Gram(s) Oral once  dextrose 50% Injectable 25 Gram(s) IV Push once  glucagon  Injectable 1 milliGRAM(s) IntraMuscular once  hydrALAZINE Injectable 10 milliGRAM(s) IV Push every 2 hours PRN  insulin lispro (ADMELOG) corrective regimen sliding scale   SubCutaneous three times a day before meals  labetalol Injectable 10 milliGRAM(s) IV Push every 2 hours PRN  niCARdipine Infusion 5 mG/Hr IV Continuous <Continuous>  pantoprazole  Injectable 40 milliGRAM(s) IV Push daily    IVF:  dextrose 5%. 1000 milliLiter(s) IV Continuous <Continuous>  sodium chloride 0.9%. 1000 milliLiter(s) IV Continuous <Continuous>      Vital Signs Last 24 Hrs  T(C): 36.9 (2022 12:00), Max: 36.9 (2022 12:00)  T(F): 98.4 (2022 12:00), Max: 98.4 (2022 12:00)  HR: 89 (2022 16:00) (56 - 91)  BP: 114/54 (2022 16:00) (101/68 - 158/80)  BP(mean): 72 (2022 16:00) (70 - 106)  RR: 19 (2022 16:00) (12 - 24)  SpO2: 96% (2022 16:00) (84% - 98%)      PHYSICAL EXAM:  GENERAL: Lying in ICU bed   HEAD: S/p L craniotomy, dressing C/D/I  DRAINS: Subgaleal ANGELA drain in place with sanguinous output   EYES: Conjunctiva and sclera clear b/l  MALLY COMA SCORE: E-4 V-1 M-6 = 11  MENTAL STATUS: Alert; Awake; Opens eyes spontaneously; Globally aphasic - intermittently answers "I don't know" to questions; Intermittently following simple commands with encouragement   CRANIAL NERVES: L gaze preference, does not cross midline. PERRL. R facial droop. Hearing/speech unable to assess   MOTOR: RUE minimal WD to noxious; LUE sustains antigravity; RLE WD, LLE sustains antigravity  SENSATION: No grimace to noxious stimuli on R side   CHEST/LUNG: Nonlabored breathing, no signs of respiratory distress   HEART: +S1/+S2; Regular rate and rhythm  ABDOMEN: Soft, nontender, nondistended  EXTREMITIES: No edema b/l  SKIN: Warm, dry    LABS:                        14.3   6.46  )-----------( 270      ( 2022 05:31 )             41.4         135  |  101  |  14.1  ----------------------------<  102<H>  3.8   |  22.0  |  0.40<L>    Ca    8.9      2022 05:31  Phos  3.9       Mg     2.0           PT/INR - ( 2022 05:31 )   PT: 11.3 sec;   INR: 0.97 ratio         PTT - ( 2022 05:31 )  PTT:35.4 sec  Urinalysis Basic - ( 2022 20:39 )    Color: Yellow / Appearance: Clear / S.010 / pH: x  Gluc: x / Ketone: Negative  / Bili: Negative / Urobili: Negative mg/dL   Blood: x / Protein: Negative / Nitrite: Negative   Leuk Esterase: Small / RBC: 0-2 /HPF / WBC 0-2   Sq Epi: x / Non Sq Epi: Occasional / Bacteria: x        CULTURES:  Culture Results:   >=3 organisms. Probable collection contamination. (01-15 @ 20:46)      RADIOLOGY & ADDITIONAL STUDIES:  CT Head No Cont (22 @ 14:31):  IMPRESSION:     3.4 x 5.1 cm acute hemorrhage along the biopsy tract in the   LEFT parietal lobe extending to the mass within the posterior corpus   callosum. Pneumocephalus noted along the biopsy tract as well as within   the mass. Overlying RIGHT parietal LEFT parietal craniotomy is noted.    MR Brain Stereotactic w/ IV Cont (22 @ 16:03):  IMPRESSION:    5.2 cm TRV x 3.3 cm AP x 3.1 cm CC solid enhancing lobulated   mass in the posterior body of the corpus callosum. Moderate vasogenic   edema is noted. A 1 cm enhancing lesion is seen in the LEFT parietal   cortex and several subcentimeter enhancing lesions are seen in the   bifrontal subcortical white matter.  FINDINGS suspicious for primary CNS   lymphoma.    MR Abdomen w/wo IV Cont (22 @ 19:55):  IMPRESSION:  Left hepatic lobe atrophy and mild intrahepatic dilatation involving   segments 2 and 3. Suspicion for stricturing mass at the junction of the   medial and lateral left lobes. 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    MR Head w/wo IV Cont (22 @ 19:55):  IMPRESSION:  1)  in addition to the corpus callosal infiltrative lesion, there are   approximately 5 other lesions with associated edema and enhancement.   Vasogenic edema is most extensivein the left parietal region. See full   description above. Differential considerations, therefore, include the   possibility of metastatic disease versus lymphoma rather than   glioblastoma. Further assessment and correlation recommended.  2)  no evidence of arterial or venous occlusive disease.    CT Chest, ABD, Pelvis w/ IV Cont (22 @ 07:03):  IMPRESSION:  Region of low attenuation in the central aspect of the left hepatic lobe   measuring approximately 2.8 cm with mild biliary ductal dilatation in the   left hepatic lobe and left hepatic lobe atrophy; a pre and post IV   contrast MRI/MRCP of the abdomen is recommended to exclude an underlying   mass.  Mildly enlarged mediastinal and bilateral hilar lymph nodes.

## 2022-01-19 NOTE — CONSULT NOTE ADULT - ASSESSMENT
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 Patient admitted to neurosurgery service on stepdown unit 1/13/21. MRI brain 1/4 showed corpus callosal infiltrative lesion, as well as 5 additional lesions with edema and enhancement. MRI ABD 1/14 also performed after CT findings concerning for hepatic lesion, revealed L hepatic lobe atrophy and mild intrahepatic dilatation involving segments 2 and 3. Suspicion for stricturing mass at the junction of the medial and lateral left lobes. 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   Patient now POD#0 s/p L craniotomy for brain tumor biopsy. Subgaleal ANGELA drain placed. Patient extubated in OR and transferred to NSICU for further management and observation. Post-operatively, developed R sided weakness, aphasia, L gaze preference, R facial droop for which code stroke was called. STAT CTH obtained which revealed 3.4 x 5.1 cm acute hemorrhage along the biopsy tract in the L parietal lobe extending to the mass within the posterior corpus callosum.     Plan:  -D/w Dr. Andujar and Dr. Frausto   -Q1hr neuro checks   -Repeat CTH at 6PM  -cVEEG  -Keppra 500mg BID   -Decadron 4mg Q6hrs   -Subgaleal ANGELA drain in place - monitor output Q1hr   -Precedex added for anxiety   -STAT CTH for any decline in mental status   -SBP goal 100-140  -Cardene gtt  -Hydralazine/Labetalol PRN  -Saturating well on room air   -Maintain SpO2>92%  -NPO  -Zofran PRN  -Protonix 40mg daily   -Orozco  -NS@60  -SCDs b/l for DVT prophylaxis   -Hold all AC/AP use at this time   -MISS

## 2022-01-19 NOTE — CONSULT NOTE ADULT - ATTENDING COMMENTS
80 y/o F PMH HTN, presented with newly diagnosed brain masses, concern for both primary and mts nature. S/p recent fall.  S/p L craniotomy for brain tumor biopsy. Post-operatively with stroke-like Sx, Cth with acute hemorrhage along the biopsy tract in L parietal lobe extending to the mass within the posterior corpus callosum.   Possible sz.  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  Plan: stability CTh  Keppra 1 gr q12hrs (also received a load of 2 gr)  agitated - received Haldol 2.5; if requires sedation - consider Precedex  cEEG  cont Dexa  maintain -140  rest as above
81F with PMH HTN presented [place] [facility] [place], +HS, -LOC. Patient states she was initially unable to stand up but eventually pulled herself up and called 911.   Does not take blood thinners.   Denies pain anywhere. On CT [place] [facility] [place], she was allegedly found to have a large cerebral mass and transferred here for neurosurgery.  Neurologic GCS 15  Hemodynamic stable  Bilateral BS  Abdomen soft and incidental finding of lucent liver cysts  No traumatic injuries detected requiring further trauma involvement

## 2022-01-19 NOTE — CONSULT NOTE ADULT - ASSESSMENT
The patient is a 81y Female who is followed by neurology because of code stroke    Code stroke  CT head shows ICH inleft parietal lobe which explains her symptoms  conitnue keppra    I was in the CT room at the time of her scan and once ICH was seen I suggested to d/c the CTA/P  Given the ICH she was not a candidate for alteplase or thrombectomy    Conitnue keppra.    Plan per Neurosurgery    Please recall as needed    Thank you for allowing me to participate in the care of your patient    Yousif Dobbins MD, PhD   [id number]

## 2022-01-19 NOTE — CHART NOTE - NSCHARTNOTEFT_GEN_A_CORE
Neurosurgery    Pt developed worsening right sided weakness, acute right facial & she became aphasic. CT brain done which revealed a left sided IPH     CT Head No Cont (01.19.22 @ 14:31)       The brain demonstrates a 3.4 x 5.1 cm acute hemorrhage along the biopsy   tract in the LEFT parietal lobe extending to the mass within the   posterior corpus callosum. Pneumocephalus noted along the biopsy tract as   well as within the mass. Overlying RIGHT parietal LEFT parietal   craniotomy is noted. No acute cerebral cortical infarct is seen. Moderate   LEFT parietal and mild RIGHT parietal vasogenic edema again noted. There   is no midline shift.      The orbits are unremarkable.  The paranasal sinuses are clear.  The nasal   cavity appears intact.  The nasopharynx is symmetric.  The central skull   base, petrous temporal bones remain intact.      IMPRESSION:   3.4 x 5.1 cm acute hemorrhage along the biopsy tract in the   LEFT parietal lobe extending to the mass within the posterior corpus   callosum. Pneumocephalus noted along the biopsy tract as well as within   the mass. Overlying RIGHT parietal LEFT parietal craniotomy is noted.       81 year old female  POD#0 s/p left craniotomy for brain biopsy. Acute right facial, aphasia, worsening right sided weakness. CTB revealed left IPH along biopsy tract    Plan:     - Q1 neuro checks  - Pending post op head CT 3:30pm   - Keppra 500 BID seizure prophylaxis   - Tylenol/Oxycodone PRN for pain   - Recommend keeping BP between 110-120  - Decadron 4mg Q6  - Protonix ordered for GI prophylaxis  - DVT prophylaxis, mechanical  SCDs

## 2022-01-19 NOTE — PROGRESS NOTE ADULT - ASSESSMENT
81 year old female with PMHx HTN who was [place] [facility] with an incidental finding of corpus callosal lesion. POD#0 s/p left craniotomy for brain biopsy. Doing well post op. 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     Plan:    - POD#0 s/p left craniotomy for brain biopsy   - Q1 neuro checks  - Pending post op head CT 3:30pm   - Keppra 500 BID seizure prophylaxis   - Tylenol/Oxycodone PRN for pain   - Recommend keeping BP between 110-120  - Decadron 4mg Q6  - Protonix ordered for GI prophylaxis  - Lovenox and SCDs     81 year old female with PMHx HTN who was [place] from [facility] [place]p [place] with an incidental finding of corpus callosal lesion. POD#0 s/p left craniotomy for brain biopsy. Doing well post op. 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     Plan:    - POD#0 s/p left craniotomy for brain biopsy   - Q1 neuro checks  - Pending post op head CT 3:30pm   - Keppra 500 BID seizure prophylaxis   - Tylenol/Oxycodone PRN for pain   - Recommend keeping BP between 110-120  - Decadron 4mg Q6  - Protonix ordered for GI prophylaxis  - DVT prophylaxis, mechanical SCD's

## 2022-01-20 NOTE — PHYSICAL THERAPY INITIAL EVALUATION ADULT - LEVEL OF INDEPENDENCE: GAIT, REHAB EVAL
maximum assist (25% patients effort)
minimum assist (75% patients effort)/moderate assist (50% patients effort)

## 2022-01-20 NOTE — PHYSICAL THERAPY INITIAL EVALUATION ADULT - MANUAL MUSCLE TESTING RESULTS, REHAB EVAL
See OT eval for UEs; right LE grossly 3/5, left LE grossly 4/5
unable to follow commands./not tested due to

## 2022-01-20 NOTE — OCCUPATIONAL THERAPY INITIAL EVALUATION ADULT - VISUAL ASSESSMENT: VISUAL FIELD CUTS
ongoing functional assessment.  Pt unable to follow commands of formal assessment due to global aphasia.

## 2022-01-20 NOTE — OCCUPATIONAL THERAPY INITIAL EVALUATION ADULT - PERTINENT HX OF CURRENT PROBLEM, REHAB EVAL
transferred from [facility] [place] and fall, also attests to feeling off for 1 month, now found with corpus callosal lesion, now s/p craniotomy for biopsy POD#1, postoperative course complicated with hemorrhage in surgical bed/biopsy tract.
Pt with a brain mass

## 2022-01-20 NOTE — OCCUPATIONAL THERAPY INITIAL EVALUATION ADULT - DIAGNOSIS, OT EVAL
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
Impaired self care and functional mobility

## 2022-01-20 NOTE — OCCUPATIONAL THERAPY INITIAL EVALUATION ADULT - RANGE OF MOTION EXAMINATION, UPPER EXTREMITY
unable to assess - pt not following commands of assessment but spontaneous movement right UE grossly WFL.  Active assisted ROM left UE WFL.  Cognitive impairment, difficulty following/understanding directions impacting motor assessment.

## 2022-01-20 NOTE — PROGRESS NOTE ADULT - SUBJECTIVE AND OBJECTIVE BOX
INTERVAL HPI/OVERNIGHT EVENTS:  81y Female PMH HTN, [place] [facility] [place], also attests to feeling off for 1 month, now found with corpus callosal lesion, now s/p craniotomy for biopsy POD#1, postoperative course complicated with hemorrhage in surgical bed/biopsy tract. Patient seen earlier this AM, appears to have headache. S/p Ofirmev and morphine with improvement.    Vital Signs Last 24 Hrs  T(C): 37.6 (19 Jan 2022 22:30), Max: 37.6 (19 Jan 2022 22:30)  T(F): 99.7 (19 Jan 2022 22:30), Max: 99.7 (19 Jan 2022 22:30)  HR: 67 (20 Jan 2022 02:00) (56 - 100)  BP: 96/50 (20 Jan 2022 02:00) (96/50 - 158/80)  BP(mean): 64 (20 Jan 2022 02:00) (64 - 108)  RR: 15 (20 Jan 2022 02:00) (12 - 27)  SpO2: 96% (20 Jan 2022 02:00) (84% - 98%)    PHYSICAL EXAM:  GENERAL: NAD, well-groomed  HEAD: Dressing c/d/i. Subgaleal drain with serosanguinous output  MALLY COMA SCORE: E- 4 V- 3 M- 5=12  MENTAL STATUS: Awake, alert; minimally conversant with some appropriate words; Does not follow commands, does not mimic  CRANIAL NERVES: PERRL. Left gaze preference. Right facial droop. Hearing grossly intact. Minimal speech, difficult to assess for dysarthria  MOTOR: Right neglect, able to lift antigravity at least 3/5; LUE/LLE moves briskly spontaneously antigravity and against resistance at least 4/5    LABS:                        14.3   6.46  )-----------( 270      ( 19 Jan 2022 05:31 )             41.4     01-19    135  |  101  |  14.1  ----------------------------<  102<H>  3.8   |  22.0  |  0.40<L>    Ca    8.9      19 Jan 2022 05:31  Phos  3.9     01-19  Mg     2.0     01-19    PT/INR - ( 19 Jan 2022 05:31 )   PT: 11.3 sec;   INR: 0.97 ratio       PTT - ( 19 Jan 2022 05:31 )  PTT:35.4 sec    01-18 @ 07:01  -  01-19 @ 07:00  --------------------------------------------------------  IN: 1300 mL / OUT: 2050 mL / NET: -750 mL    01-19 @ 07:01  -  01-20 @ 03:52  --------------------------------------------------------  IN: 1382.3 mL / OUT: 1830 mL / NET: -447.7 mL    RADIOLOGY & ADDITIONAL TESTS:  CT Head No Cont (01.19.22 @ 14:31)  IMPRESSION:   3.4 x 5.1 cm acute hemorrhage along the biopsy tract in the   LEFT parietal lobe extending to the mass within the posterior corpus   callosum. Pneumocephalus noted along the biopsy tract as well as within   the mass. Overlying RIGHT parietal LEFT parietal craniotomy is noted.

## 2022-01-20 NOTE — OCCUPATIONAL THERAPY INITIAL EVALUATION ADULT - LEVEL OF INDEPENDENCE, OT EVAL
minimum assist (75% patients effort)/moderate assist (50% patients effort)
dependent (less than 25% patients effort)

## 2022-01-20 NOTE — PHYSICAL THERAPY INITIAL EVALUATION ADULT - PERTINENT HX OF CURRENT PROBLEM, REHAB EVAL
81F with PMH HTN [place] [facility] [place], -[place]. Patient states she was initially unable to stand up but eventually pulled herself up and called 911. Does not take blood thinners. Denies pain anywhere. [place] [facility] [place] found to have a large cerebral mass and transferred here for neurosurgery.
81F with PMH HTN [place] [facility] [place], +HS, -LOC. Patient states she was initially unable to stand up but eventually pulled herself up and called 911. Does not take blood thinners. Denies pain anywhere. [place] [facility] [place] found to have a large cerebral mass and transferred here for neurosurgery.

## 2022-01-20 NOTE — OCCUPATIONAL THERAPY INITIAL EVALUATION ADULT - LEVEL OF INDEPENDENCE: BED TO CHAIR, REHAB EVAL
Assess.  Not safe due to impaired following directions, decreased functional use of right UE/LE due to neglect.

## 2022-01-20 NOTE — OCCUPATIONAL THERAPY INITIAL EVALUATION ADULT - LEVEL OF INDEPENDENCE: SIT/STAND, REHAB EVAL
pt required min/mod assist to ambulate/minimum assist (75% patients effort)
moderate assist (50% patients effort)

## 2022-01-20 NOTE — OCCUPATIONAL THERAPY INITIAL EVALUATION ADULT - TRANSFER SAFETY CONCERNS NOTED: SIT/STAND, REHAB EVAL
right neglect/decreased safety awareness/decreased sequencing ability/decreased weight-shifting ability

## 2022-01-20 NOTE — OCCUPATIONAL THERAPY INITIAL EVALUATION ADULT - VISUAL ACUITY
As per chart, pt wears reading glasses at baseline.  Pt now demonstrates left gaze preference.  Pt does not attend to right side of environment despite auditory/visual stimuli.  Pt does not cross midline despite stimuli.
no vision c/o offered; pt wears reading glasses

## 2022-01-20 NOTE — OCCUPATIONAL THERAPY INITIAL EVALUATION ADULT - IMPAIRMENTS CONTRIBUTING IMPAIRED BED MOBILITY, REHAB EVAL
right neglect observed/impaired balance/cognition/impaired coordination/impaired motor control/impaired sensory feedback/decreased strength

## 2022-01-20 NOTE — OCCUPATIONAL THERAPY INITIAL EVALUATION ADULT - NS ASR FOLLOW COMMAND OT EVAL
100% of the time/able to follow single-step instructions
when mobility related./25% of the time/able to follow single-step instructions

## 2022-01-20 NOTE — OCCUPATIONAL THERAPY INITIAL EVALUATION ADULT - ASSISTIVE DEVICE FOR TRANSFER: SIT/STAND, REHAB EVAL
attempted use of RW but pt not using right UE due to neglect, right knee buckling.  Pt unable to maintain grasp on RW due to neglect.

## 2022-01-20 NOTE — EEG REPORT - NS EEG TEXT BOX
[facility]   COMPREHENSIVE EPILEPSY CENTER   REPORT OF LONG-TERM VIDEO EEG     [facility]: [address], [place] [address], Ph#: [phone]  [facility]: [id number] [place] [address], Ph#: [phone]  [facility]: [address] [place] [address], Ph#: [phone]    Patient Name: TYSON BERNAL  Age and : 81y (44)  MRN #: [id number]  Location: [place] [address]  Referring Physician: Cb Frausto    Start Time/Date: 15:57 on 22  End Time/Date: 08:00 on 22  Duration: 15h 54m    _____________________________________________________________  STUDY INFORMATION    EEG Recording Technique:  The patient underwent continuous Video-EEG monitoring, using Telemetry System hardware on the XLTek Digital System. EEG and video data were stored on a computer hard drive with important events saved in digital archive files. The material was reviewed by a physician (electroencephalographer / epileptologist) on a daily basis. Alexy and seizure detection algorithms were utilized and reviewed. An EEG Technician attended to the patient, and was available throughout daytime work hours.  The epilepsy center neurologist was available in person or on call 24-hours per day.    EEG Placement and Labeling of Electrodes:  The EEG was performed utilizing 20 channel referential EEG connections (coronal over temporal over parasagittal montage) using all standard 10-20 electrode placements with EKG, with additional electrodes placed in the inferior temporal region using the modified 10-10 montage electrode placements for elective admissions, or if deemed necessary. Recording was at a sampling rate of 256 samples per second per channel. Time synchronized digital video recording was done simultaneously with EEG recording. A low light infrared camera was used for low light recording.     _____________________________________________________________  HISTORY    Patient is a 81y old  Female who presents with a chief complaint of Brain mass (2022 03:51)      PERTINENT MEDICATION:  levETIRAcetam  IVPB 500 milliGRAM(s) IV Intermittent every 12 hours    _____________________________________________________________  STUDY INTERPRETATION    Findings: The background was continuous and reactive. During wakefulness, the posterior dominant rhythm consisted of asymmetric 9 Hz activity, with amplitude to 30 uV, with better formed over the right hemisphere.     Background Slowing:  Excess diffuse polymorphic delta slowing.    Focal Slowing:   Continuous theta/delta slowing in the left hemisphere, max frontotemporally (max Fp1/F7) and rarely occurred as very brief fluctuating 1-1.5 Hz lateralized rhythmic delta activity (LRDA).    Sleep Background:  Drowsiness was characterized by fragmentation, attenuation, and slowing of the background activity.    Sleep was characterized by the presence of asymmetric sleep spindles and K-complexes, better formed over the right hemisphere.    Other Non-Epileptiform Findings:  Attenuation of fast activity in the left hemisphere.    Interictal Epileptiform Activity:   Occasional left frontal (max Fp1/F3) sharp wave discharges.    Events:  Clinical events: None recorded.  Seizures: None recorded.    Activation Procedures:   Hyperventilation was not performed.    Photic stimulation was performed and did not elicit any abnormality.     Artifacts:  Intermittent myogenic and movement artifacts were noted.    ECG:  The heart rate on single channel ECG was predominantly between 70-80 BPM.    _____________________________________________________________  EEG SUMMARY/CLASSIFICATION    Abnormal EEG in the awake, drowsy and asleep states.  - Occasional left frontal (max Fp1/F3) sharp wave discharges.  - Continuous theta/delta slowing in the left hemisphere, max frontotemporally (max Fp1/F7) and rarely occurred as very brief fluctuating 1-1.5 Hz lateralized rhythmic delta activity (LRDA).  - Attenuation of fast activity in the left hemisphere.  - Mild generalized slowing.    _____________________________________________________________  EEG IMPRESSION/CLINICAL CORRELATE    Abnormal EEG study.  1. Potential epileptogenic focus in the left frontal region.   2. Structural abnormality and cortical dysfunction in the left hemisphere region.   3. Mild nonspecific diffuse or multifocal cerebral dysfunction.   4. No seizure seen.    _____________________________________________________________    Torres Francis MD  Director, Epilepsy/EMU - [facility]

## 2022-01-20 NOTE — PHYSICAL THERAPY INITIAL EVALUATION ADULT - GENERAL OBSERVATIONS, REHAB EVAL
Pt. greeted on stretcher in ED +Iv lock, agreeable to PT
Pt received in bed supine, + ANGELA drain, telemetry/SpO2 monitor, IV, a-line, ortega, EEG.

## 2022-01-20 NOTE — OCCUPATIONAL THERAPY INITIAL EVALUATION ADULT - ADDITIONAL COMMENTS
Pt unable to provide any information due to global aphasia (?) at time of this eval.  As per prior eval, pt was living independently prior to admission.  She has grab bars in shower, but no other medical equipment.      Pt is right handed
Pt has shower stall with curtain and grab bars  Pt does not own any DME  Pt is right handed

## 2022-01-20 NOTE — PHYSICAL THERAPY INITIAL EVALUATION ADULT - ACTIVE RANGE OF MOTION EXAMINATION, REHAB EVAL
no Active ROM deficits were identified
b/l UE/LE appears functional with spontaneous movement but unable to follow commands.

## 2022-01-20 NOTE — OCCUPATIONAL THERAPY INITIAL EVALUATION ADULT - LIVES WITH, PROFILE
in a mobile home with 2 steps to enter and 3 steps inside both with railings/alone
in a mobile home with 2 steps to enter and 3 steps inside both with railings/alone

## 2022-01-20 NOTE — PROGRESS NOTE ADULT - ASSESSMENT
81y Female PMH HTN, [place] [facility] [place], also attests to feeling off for 1 month, now found with corpus callosal lesion, now s/p craniotomy for biopsy POD#1, postoperative course complicated with hemorrhage in surgical bed/biopsy tract  - Exam stable from postoperative hemorrhage    PLAN:  - Will d/w attending  - Q1 neuro checks  - Continue subgaleal drain to thumbprint for now, monitor output, d/c pending final output  - Continue Decadron 4 Q6; protonix/ISS while on steroids  - Continue Keppra 500 Q12  - SBP goal < 140; currently on cardene gtt; wean as tolerated. Hydralazine/labetalol PRN  - Precedex PRN, wean per NSICU as tolerated. Avoid oversedation  - PT/OT/Speech  - Hold ACT/APT for now  - Supportive care/further medical management per NSICU  - Final plan pending AM discussion and rounds with attending

## 2022-01-20 NOTE — PROGRESS NOTE ADULT - ASSESSMENT
82 y/o F PMH HTN, presented with newly diagnosed brain masses, concern for both primary and mts nature. S/p recent fall.  S/p L craniotomy for brain tumor biopsy. Post-operatively with stroke-like Sx, Cth with acute hemorrhage along the biopsy tract in L parietal lobe extending to the mass within the posterior corpus callosum.   Possible sz. Negative cEEG now.   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    Plan:   neurochecks  Keppra 1 gr q12hrs - continue  off Precedex now, d/c  cEEG - d/c, cont Keppra  cont Dexa  maintain -140  -Subgaleal ANGELA drain in place - monitor output Q1hr, possible d/c in am  -STAT CTH for any decline in mental status   -Maintain SpO2>92%  -NPO for now, S/S eval in am  -Protonix 40mg daily as on steroids  -NS@60  -SCDs b/l for DVT prophylaxis   -Hold all AC/AP use at this time   -cont ISS      Pt is critically ill and at high risk of rapid deterioration/death due to abovementioned conditions.   Still requires critical care interventions - ongoing frequent evaluations, interventions and management adjustment by the Attending and ICU team, - and included review of relevant history, clinical examination, review of data and images, discussion of treatment with the multidisciplinary team and any consultants involved in this patient’s care as well as family discussion.

## 2022-01-20 NOTE — OCCUPATIONAL THERAPY INITIAL EVALUATION ADULT - SHORT TERM MEMORY, REHAB EVAL
needs further assessment
Pt needed min verbal cues to prevent pulling on drain and bandages, despite prior instruction./impaired

## 2022-01-20 NOTE — PHYSICAL THERAPY INITIAL EVALUATION ADULT - PLANNED THERAPY INTERVENTIONS, PT EVAL
balance training/bed mobility training/gait training/postural re-education/strengthening/transfer training
bed mobility training/gait training/transfer training

## 2022-01-20 NOTE — OCCUPATIONAL THERAPY INITIAL EVALUATION ADULT - ORIENTATION, REHAB EVAL
Pt unable to state her name but chooses correct name when given choices.  Pt chooses "hospital" when offered choices for place.  Pt did not choose correct year with choices.
person/place

## 2022-01-20 NOTE — OCCUPATIONAL THERAPY INITIAL EVALUATION ADULT - PLANNED THERAPY INTERVENTIONS, OT EVAL
ADL retraining/balance training/bed mobility training/cognitive, visual perceptual/fine motor coordination training/motor coordination training/ROM/transfer training
ADL retraining/balance training/bed mobility training/cognitive, visual perceptual/fine motor coordination training/motor coordination training/neuromuscular re-education/strengthening/transfer training

## 2022-01-20 NOTE — PHYSICAL THERAPY INITIAL EVALUATION ADULT - ADDITIONAL COMMENTS
Pt. lives in a mobile home with 2 steps to enter and 3 steps inside both with railings. Pt. was independent PTA and does not own DME.
Pt. lives in a mobile home with 2 steps to enter and 3 steps inside both with railings. Pt. was independent PTA and does not own DME.

## 2022-01-20 NOTE — OCCUPATIONAL THERAPY INITIAL EVALUATION ADULT - GENERAL OBSERVATIONS, REHAB EVAL
+IV, +telemetry monitor
Pt received semifowler in bed, +bilateral VCDs, +IV, +ortega, +cardiac monitor with , +drain, +O2 via nasal canula, intermittently restless, agreeable to OT eval.

## 2022-01-20 NOTE — PROGRESS NOTE ADULT - SUBJECTIVE AND OBJECTIVE BOX
Patient seen this morning. Complains of fatigue.    REVIEW OF SYSTEMS  Constitutional - No fever,  + fatigue  Neurological - No headaches, + memory loss, + loss of strength, No numbness, No tremors    FUNCTIONAL PROGRESS  1/20 OT    Bed Mobility: Supine to Sit:     · Level of [place]	moderate assist (50% patients effort)  · Physical Assist/Nonphysical Assist	1 person assist; verbal cues; nonverbal cues (demo/gestures)    Bed Mobility Analysis:     · Impairments Contributing to Impaired Bed Mobility	impaired balance; cognition; decreased strength; impaired sensory feedback; impaired coordination; impaired motor control; right neglect observed    Transfer: Bed to Chair:    Bed to Chair Transfer:    Transfer Skill: Bed to Chair   · Level of [place]	Assess.  Not safe due to impaired following directions, decreased functional use of right UE/LE due to neglect.    Transfer: Chair to Bed:     · Level of [place]	unable to perform    Bed to Chair Safety Analysis:     · Impairments Contributing to Impaired Transfers	impaired balance; cognition; impaired coordination; impaired sensory feedback; decreased strength  · Transfer Safety Concerns Noted	decreased safety awareness; decreased weight-shifting ability    Transfer: Sit to Stand:     · Level of [place]	moderate assist (50% patients effort)  · Physical Assist/Nonphysical Assist	2 person assist; verbal cues; nonverbal cues (demo/gestures)  · Assistive Device	attempted use of RW but pt not using right UE due to neglect, right knee buckling.  Pt unable to maintain grasp on RW due to neglect.    Sit/Stand Transfer Safety Analysis:     · Transfer Safety Concerns Noted	decreased weight-shifting ability; decreased sequencing ability; decreased safety awareness; right neglect  · Impairments Contributing to Impaired Transfers	impaired balance; cognition; impaired coordination; impaired sensory feedback; decreased strength    Transfer: Toilet Transfer:     · Level of [place]	assess as able    Sensory Examination:     Grossly Intact:   · Gross Sensory Examination	unable to assess - pt not following commands of assessment    · Balance Skills	static standing poor      VITALS  T(C): 36.3 (01-20-22 @ 07:28), Max: 37.6 (01-19-22 @ 22:30)  HR: 90 (01-20-22 @ 11:00) (56 - 100)  BP: 132/65 (01-20-22 @ 11:00) (96/50 - 158/80)  RR: 16 (01-20-22 @ 11:00) (12 - 27)  SpO2: 98% (01-20-22 @ 09:00) (90% - 98%)  Wt(kg): --    MEDICATIONS   dexAMETHasone  Injectable 6 milliGRAM(s) every 6 hours  dextrose 40% Gel 15 Gram(s) once  dextrose 5%. 1000 milliLiter(s) <Continuous>  dextrose 50% Injectable 25 Gram(s) once  glucagon  Injectable 1 milliGRAM(s) once  hydrALAZINE Injectable 10 milliGRAM(s) every 2 hours PRN  insulin lispro (ADMELOG) corrective regimen sliding scale   every 6 hours  labetalol Injectable 10 milliGRAM(s) every 2 hours PRN  levETIRAcetam  IVPB 500 milliGRAM(s) every 12 hours  ondansetron Injectable 4 milliGRAM(s) every 6 hours PRN  pantoprazole  Injectable 40 milliGRAM(s) daily  sodium chloride 0.9%. 1000 milliLiter(s) <Continuous>      RECENT LABS/IMAGING                          13.3   11.42 )-----------( 261      ( 20 Jan 2022 05:13 )             38.6     01-20    135  |  103  |  13.3  ----------------------------<  149<H>  3.8   |  19.0<L>  |  0.32<L>    Ca    8.2<L>      20 Jan 2022 05:13  Phos  3.3     01-20  Mg     2.1     01-20      PT/INR - ( 19 Jan 2022 05:31 )   PT: 11.3 sec;   INR: 0.97 ratio         PTT - ( 19 Jan 2022 05:31 )  PTT:35.4 sec      MRI HEAD 1/14 - 1)  in addition to the corpus callosal infiltrative lesion, there are approximately 5 other lesions with associated edema and enhancement. Vasogenic edema is most extensive in the left parietal region. See full description above. Differential considerations, therefore, include the possibility of metastatic disease versus lymphoma rather than glioblastoma. Further assessment and correlation recommended.2)  no evidence of arterial or venous occlusive disease.    MRI ABD 1/14 - Left hepatic lobe atrophy and mild intrahepatic dilatation involving segments 2 and 3. Suspicion for stricturing mass at the junction of the medial and lateral left lobes. 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    MR Brain Stereotactic w/ IV Cont 1/18: 5.2 cm TRV x 3.3 cm AP x 3.1 cm CC solid enhancing lobulated mass in the posterior body of the corpus callosum. Moderate vasogenic edema is noted. A 1 cm enhancing lesion is seen in the LEFT parietal cortex and several subcentimeter enhancing lesions are seen in the bifrontal subcortical white matter.  FINDINGS suspicious for primary CNS lymphoma.    CT Head 1/19: 3.4 x 5.1 cm acute hemorrhage along the biopsy tract in the LEFT parietal lobe extending to the mass within the posterior corpus callosum. Pneumocephalus noted along the biopsy tract as well as within the mass. Overlying RIGHT parietal LEFT parietal craniotomy is noted.    CT Head 1/19: No change since 2:30 PM. Left parietal parenchymal hemorrhage along the biopsy tract to the mass in the splenium of the corpus callosum.      ----------------------------------------------------------------------------------------  PHYSICAL EXAM  Constitutional - NAD, Comfortable  HEENT - NCAT, EOMI  Neck - Supple, No limited ROM  Chest - Breathing comfortably, No wheezing  Cardiovascular - S1S2   Abdomen - Soft   Extremities - No C/C/E, No calf tenderness   Neurologic Exam -                    Cognitive - AAO to self, right neglect     Communication - Dysarthric     Cranial Nerves - left facial droop     Motor - follows commands on the left. able to move the right side but with significant neglect and inattention component  Psychiatric - Calm  ----------------------------------------------------------------------------------------  ASSESSMENT/PLAN  81yFemale with functional deficits after 2 weeks of feeling off, now found to have a brain mass and IPH post-biopsy  Brain Mass - Keppra, Decadron  IPH - Monitoring  HTN - Hydralazine, Labetalol  Pain - Tylenol, Oxycodone  DVT PPX - SCDs  Rehab - Patient currently being medically optimized in the ICU.     Will continue to follow. Rehab recommendations are dependent on how functional progress changes as well as how patient continues to participate and tolerate therapeutic interventions, which may change. Recommend ongoing mobilization by staff to maintain cardiopulmonary function and prevention of secondary complications related to debility. Discussed with rehab team.

## 2022-01-20 NOTE — OCCUPATIONAL THERAPY INITIAL EVALUATION ADULT - LEVEL OF INDEPENDENCE: DRESS LOWER BODY, OT EVAL
dependent (less than 25% patients effort)
to don socks seated at the edge of the stretcher/minimum assist (75% patients effort)

## 2022-01-20 NOTE — PROGRESS NOTE ADULT - SUBJECTIVE AND OBJECTIVE BOX
HPI: Patient is a 80 y/o F PMH HTN [place] [facility] [place]. Patient states she came home from work, didn't feel well and then fell and hit her head into her bathtub. Unsure if she syncopized. Denies LOC. Patient called EMS herself. Patient states she's been feeling "off" for 2 weeks but can't describe what was wrong with her. She noticed today that she felt unsteady on her feet. Currently denies headache, use of blood thinners, cancer history, visual disturbance, nausea/vomiting, fever/chills, weakness, numbness/tingling. 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   (13 Jan 2022 20:29)  Patient admitted to neurosurgery service on stepdown unit 1/13/21. MRI brain 1/4 showed corpus callosal infiltrative lesion, as well as 5 additional lesions with edema and enhancement. MRI ABD 1/14 also performed after CT findings concerning for hepatic lesion, revealed L hepatic lobe atrophy and mild intrahepatic dilatation involving segments 2 and 3. Suspicion for stricturing mass at the junction of the medial and lateral left lobes. 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   Patient now POD#0 s/p L craniotomy for brain tumor biopsy. Subgaleal ANGELA drain placed. Patient extubated in OR and transferred to NSICU for further management and observation. Post-operatively, developed R sided weakness, aphasia, L gaze preference, R facial droop for which code stroke was called. STAT CTH obtained which revealed 3.4 x 5.1 cm acute hemorrhage along the biopsy tract in the L parietal lobe extending to the mass within the posterior corpus callosum.     VS, labs, imaging reviewed.  O/n events: none reported.  ROS limited due to pt's aphasia.    PHYSICAL EXAM:  GENERAL: Lying in ICU bed, tearful  HEAD: S/p L craniotomy, dressing C/D/I  DRAINS: Subgaleal ANGELA drain in place with sanguinous output   MENTAL STATUS: Alert; Awake; Opens eyes spontaneously; mostly expressive aphasia - improved today; Intermittently following simple commands with encouragement   CRANIAL NERVES: L gaze preference, does not cross midline. PERRL. R facial droop. Hearing/speech unable to assess   MOTOR: RUE moves spont; LUE sustains antigravity; RLE moves spont, LLE sustains antigravity  CHEST/LUNG: Nonlabored breathing, no signs of respiratory distress   HEART: +S1/+S2; Regular rate and rhythm  ABDOMEN: Soft, nontender, nondistended  EXTREMITIES: No edema b/l  SKIN: Warm, dry

## 2022-01-20 NOTE — PHYSICAL THERAPY INITIAL EVALUATION ADULT - GAIT DEVIATIONS NOTED, PT EVAL
right knee intermittently buckling, requring blocking, decreased toe clearance right LE - both increasing with fatigue
needs right knee blocked and assist to advance LEs/decreased weight-shifting ability

## 2022-01-20 NOTE — OCCUPATIONAL THERAPY INITIAL EVALUATION ADULT - FINE MOTOR COORDINATION, RIGHT HAND, MANIPULATE OBJECTS, OT EVAL
pt fidgets with blankets, lines.  Ongoing functional assessment as pt has difficulty following commands.

## 2022-01-21 NOTE — PROGRESS NOTE ADULT - SUBJECTIVE AND OBJECTIVE BOX
HPI: Patient is a 82 y/o F PMH HTN [place] [facility] [place]. Patient states she came home from work, didn't feel well and then fell and hit her head into her bathtub. Unsure if she syncopized. Denies LOC. Patient called EMS herself. Patient states she's been feeling "off" for 2 weeks but can't describe what was wrong with her. She noticed today that she felt unsteady on her feet. Currently denies headache, use of blood thinners, cancer history, visual disturbance, nausea/vomiting, fever/chills, weakness, numbness/tingling. 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   (13 Jan 2022 20:29)  Patient admitted to neurosurgery service on stepdown unit 1/13/21. MRI brain 1/4 showed corpus callosal infiltrative lesion, as well as 5 additional lesions with edema and enhancement. MRI ABD 1/14 also performed after CT findings concerning for hepatic lesion, revealed L hepatic lobe atrophy and mild intrahepatic dilatation involving segments 2 and 3. Suspicion for stricturing mass at the junction of the medial and lateral left lobes. 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   Patient now POD#0 s/p L craniotomy for brain tumor biopsy. Subgaleal ANGELA drain placed. Patient extubated in OR and transferred to NSICU for further management and observation. Post-operatively, developed R sided weakness, aphasia, L gaze preference, R facial droop for which code stroke was called. STAT CTH obtained which revealed 3.4 x 5.1 cm acute hemorrhage along the biopsy tract in the L parietal lobe extending to the mass within the posterior corpus callosum.     VS, labs, imaging reviewed.  O/n events: none reported. Agitated this am, required 1:1.  ROS limited due to pt's aphasia.    PHYSICAL EXAM:  GENERAL: Lying in ICU bed, tearful  HEAD: S/p L craniotomy, dressing C/D/I  DRAINS: Subgaleal ANGELA drain in place with sanguinous output   MENTAL STATUS: Alert; Awake; Opens eyes spontaneously; mostly expressive aphasia; Intermittently following simple commands with encouragement   CRANIAL NERVES: L gaze preference, does not cross midline. PERRL. R facial droop. Hearing/speech unable to assess   MOTOR: RUE moves spont; LUE sustains antigravity; RLE moves spont, LLE sustains antigravity  CHEST/LUNG: Nonlabored breathing, no signs of respiratory distress   HEART: +S1/+S2; Regular rate and rhythm  ABDOMEN: Soft, nontender, nondistended  EXTREMITIES: No edema b/l  SKIN: Warm, dry

## 2022-01-21 NOTE — PROGRESS NOTE ADULT - ASSESSMENT
82 y/o F PMH HTN, presented with newly diagnosed brain masses, concern for both primary and mts nature. S/p recent fall.  S/p L craniotomy for brain tumor biopsy. Post-operatively with stroke-like Sx, Cth with acute hemorrhage along the biopsy tract in L parietal lobe extending to the mass within the posterior corpus callosum.   Possible sz. Negative cEEG now.   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    Plan:   neurochecks  Keppra 1 gr q12hrs - continue  Precedex PRN  switch to VPA with the benefit of mood stabilization  cont Dexa, start slow tapering  maintain -140  -STAT CTH for any decline in mental status   -Maintain SpO2>92%  -diet as tolerated  -Protonix 40mg daily as on steroids  -NS@60 till full PO  -SCDs b/l for DVT prophylaxis   -Hold all AC/AP use at this time   -cont ISS      Pt is critically ill and at high risk of rapid deterioration/death due to abovementioned conditions.   Still requires critical care interventions - ongoing frequent evaluations, interventions and management adjustment by the Attending and ICU team, - and included review of relevant history, clinical examination, review of data and images, discussion of treatment with the multidisciplinary team and any consultants involved in this patient’s care as well as family discussion.

## 2022-01-21 NOTE — PROGRESS NOTE ADULT - SUBJECTIVE AND OBJECTIVE BOX
Pt seen and examined. Appears extremely altered and somewhat agitated.    REVIEW OF SYSTEMS:  Unobtainable in this pt.       MEDICATIONS:  MEDICATIONS  (STANDING):  dexAMETHasone  Injectable 6 milliGRAM(s) IV Push every 6 hours  dextrose 40% Gel 15 Gram(s) Oral once  dextrose 5%. 1000 milliLiter(s) (50 mL/Hr) IV Continuous <Continuous>  dextrose 50% Injectable 25 Gram(s) IV Push once  glucagon  Injectable 1 milliGRAM(s) IntraMuscular once  insulin lispro (ADMELOG) corrective regimen sliding scale   SubCutaneous every 6 hours  levETIRAcetam  IVPB 500 milliGRAM(s) IV Intermittent every 12 hours  pantoprazole  Injectable 40 milliGRAM(s) IV Push daily  sodium chloride 0.9%. 1000 milliLiter(s) (60 mL/Hr) IV Continuous <Continuous>    MEDICATIONS  (PRN):  hydrALAZINE Injectable 10 milliGRAM(s) IV Push every 2 hours PRN SBP > 140  labetalol Injectable 10 milliGRAM(s) IV Push every 2 hours PRN SBP > 140  ondansetron Injectable 4 milliGRAM(s) IV Push every 6 hours PRN Nausea and/or Vomiting      Allergies    Bananas (Angioedema; Anaphylaxis; Short breath)  Kiwi (Angioedema; Anaphylaxis; Short breath)  latex (Unknown)  [place] (Angioedema; Anaphylaxis; Short breath)  No Known Drug Allergies  strawberry (Angioedema; Anaphylaxis; Short breath)    Intolerances        Vital Signs Last 24 Hrs  T(C): 36.8 (21 Jan 2022 07:44), Max: 37.3 (21 Jan 2022 03:18)  T(F): 98.3 (21 Jan 2022 07:44), Max: 99.1 (21 Jan 2022 03:18)  HR: 96 (21 Jan 2022 08:00) (71 - 115)  BP: 132/81 (21 Jan 2022 07:00) (95/71 - 150/68)  BP(mean): 95 (21 Jan 2022 07:00) (67 - 106)  RR: 15 (21 Jan 2022 08:00) (13 - 23)  SpO2: 98% (21 Jan 2022 08:00) (90% - 98%)    01-20 @ 07:01  -  01-21 @ 07:00  --------------------------------------------------------  IN: 1720 mL / OUT: 2815 mL / NET: -1095 mL        PHYSICAL EXAM:    General: Well developed; altered when seen.  HEENT: MMM, conjunctiva pink and sclera anicteric.  Lungs: clear to auscultation bilaterally.  Cor: RRR S1, S2 only.  Gastrointestinal: Abdomen: Soft non-tender non-distended; Normal bowel sounds; No hepatosplenomegaly.  Extremities: no cyanosis, clubbing or edema.  Skin: Warm and dry. No obvious rash  Neuro: Pt. alert but altered.    LABS:  CBC Full  -  ( 21 Jan 2022 03:58 )  WBC Count : 13.41 K/uL  RBC Count : 4.03 M/uL  Hemoglobin : 12.3 g/dL  Hematocrit : 36.9 %  Platelet Count - Automated : 332 K/uL  Mean Cell Volume : 91.6 fl  Mean Cell Hemoglobin : 30.5 pg  Mean Cell Hemoglobin Concentration : 33.3 gm/dL  Auto Neutrophil # : x  Auto Lymphocyte # : x  Auto Monocyte # : x  Auto Eosinophil # : x  Auto Basophil # : x  Auto Neutrophil % : x  Auto Lymphocyte % : x  Auto Monocyte % : x  Auto Eosinophil % : x  Auto Basophil % : x    01-21    139  |  106  |  16.5  ----------------------------<  133<H>  3.5   |  21.0<L>  |  0.37<L>    Ca    8.6      21 Jan 2022 03:58  Phos  2.2     01-21  Mg     2.3     01-21                        RADIOLOGY & ADDITIONAL STUDIES (The following images were personally reviewed):

## 2022-01-21 NOTE — SWALLOW BEDSIDE ASSESSMENT ADULT - ORAL PHASE
Decreased anterior-posterior movement of the bolus/Delayed oral transit time impacted by reduced attention to bolus/Decreased anterior-posterior movement of the bolus/Delayed oral transit time oral swishing noted/Decreased anterior-posterior movement of the bolus/Delayed oral transit time

## 2022-01-21 NOTE — SWALLOW BEDSIDE ASSESSMENT ADULT - SWALLOW EVAL: DIAGNOSIS
Oral dysphagia for assessed trials, impacted by cognition. Pharyngeal stage of swallow WFL for puree & mildly thick fluids. Pharyngeal dysphagia suspected for thin fluids: throat clearing post intake

## 2022-01-21 NOTE — PROGRESS NOTE ADULT - ASSESSMENT
Assessment:  81y Female PMH HTN, transferred from [facility] [place] after [place] and fall, also attests to feeling off for 1 month, now found with corpus callosal lesion, now s/p craniotomy for biopsy POD#2, postoperative course complicated with hemorrhage in surgical bed/biopsy tract. Repeat CTH stable. Exam stable with some improvements in speech.     PLAN:  - Q2 neuro checks  - Continue subgaleal drain to thumbprint for now, monitor output, possible removal pending output   - Decadron increased to 6 Q6; protonix/ISS while on steroids  - Continue Keppra 500 Q12  - SBP goal < 140; Hydralazine/labetalol PRN  - PT/OT/Speech, PM&R following  - Hold ACT/APT for now, possible prophylactic lovenox after removal of SG drain   - Supportive care/further medical management per NSICU  - Final plan pending AM discussion and rounds with attending

## 2022-01-21 NOTE — PROGRESS NOTE ADULT - ASSESSMENT
Status post brain biopsy awaiting optimization of her neurological status before proceeding with IR directed liver biopsy. Will re-evaluate pt. Monday. Daily GI f/u is not presently required.

## 2022-01-21 NOTE — SWALLOW BEDSIDE ASSESSMENT ADULT - COMMENTS
As per MD note: "81y Female PMH HTN, transferred from [facility] [place] after trip and fall, also attests to feeling off for 1 month, now found with corpus callosal lesion, now s/p craniotomy for biopsy POD#2, postoperative course complicated with hemorrhage in surgical bed/biopsy tract. Repeat CTH stable. Exam stable with some improvements in speech."

## 2022-01-21 NOTE — PROGRESS NOTE ADULT - SUBJECTIVE AND OBJECTIVE BOX
Patient is a 81y old  Female who presents with a chief complaint of Brain mass (20 Jan 2022 20:58)    HPI:  Patient is a 81y old  Female who presents with a chief complaint of brain mass.    HPI: Patient is a 80 y/o F PMH HTN transferred from [facility] s/p trip & fall. Patient states she came home from work, didn't feel well and then fell and hit her head into her bathtub. Unsure if she syncopized. Denies LOC. Patient called EMS herself. Patient states she's been feeling "off" for 2 weeks but can't describe what was wrong with her. She noticed today that she felt unsteady on her feet. Currently denies headache, use of blood thinners, cancer history, visual disturbance, nausea/vomiting, fever/chills, weakness, numbness/tingling. 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  (13 Jan 2022 20:29)      Interval history:  Patient seen and examined by  neurosurgery team. Patient is post op day 2 from tumor biopsy, course complicated by post-procedure L parietal lobe hematoma, repeat CTH stable. EEG negative for seizure, d/c'd. No acute events reported by staff.       Vital Signs Last 24 Hrs  T(C): 36.8 (20 Jan 2022 23:40), Max: 36.8 (20 Jan 2022 16:00)  T(F): 98.3 (20 Jan 2022 23:40), Max: 98.3 (20 Jan 2022 23:40)  HR: 89 (21 Jan 2022 00:00) (61 - 115)  BP: 134/82 (21 Jan 2022 00:00) (99/49 - 150/68)  BP(mean): 90 (21 Jan 2022 00:00) (63 - 106)  RR: 13 (21 Jan 2022 00:00) (13 - 24)  SpO2: 92% (21 Jan 2022 00:00) (90% - 98%)      Physical Exam:  Constitutional: NAD, lying in bed  Neuro  * Mental Status: Awake, alert, confused. Able to say name, repeat with some difficulty but overall improved from prior exam. Some R neglect.  * Cranial Nerves: L gaze preference but able to cross midline. Mild R facial droop.   * Motor: RUE AG distally, LUE 5/5, RLE AG, LLE 5/5  * Sensory: Sensation grossly intact to noxious stimuli   * Reflexes: not assessed   Skin: SG ANGELA drain in place, incision inspected, no drainage noted, no erythema       LABS:                        13.3   11.42 )-----------( 261      ( 20 Jan 2022 05:13 )             38.6     01-20    135  |  103  |  13.3  ----------------------------<  149<H>  3.8   |  19.0<L>  |  0.32<L>    Ca    8.2<L>      20 Jan 2022 05:13  Phos  3.3     01-20  Mg     2.1     01-20    PT/INR - ( 19 Jan 2022 05:31 )   PT: 11.3 sec;   INR: 0.97 ratio    PTT - ( 19 Jan 2022 05:31 )  PTT:35.4 sec    CULTURES:  Culture Results:   >=3 organisms. Probable collection contamination. (01-15 @ 20:46)      Medications:  MEDICATIONS  (STANDING):  dexAMETHasone  Injectable 6 milliGRAM(s) IV Push every 6 hours  dextrose 40% Gel 15 Gram(s) Oral once  dextrose 5%. 1000 milliLiter(s) (50 mL/Hr) IV Continuous <Continuous>  dextrose 50% Injectable 25 Gram(s) IV Push once  glucagon  Injectable 1 milliGRAM(s) IntraMuscular once  insulin lispro (ADMELOG) corrective regimen sliding scale   SubCutaneous every 6 hours  levETIRAcetam  IVPB 500 milliGRAM(s) IV Intermittent every 12 hours  pantoprazole  Injectable 40 milliGRAM(s) IV Push daily  sodium chloride 0.9%. 1000 milliLiter(s) (60 mL/Hr) IV Continuous <Continuous>    MEDICATIONS  (PRN):  hydrALAZINE Injectable 10 milliGRAM(s) IV Push every 2 hours PRN SBP > 140  labetalol Injectable 10 milliGRAM(s) IV Push every 2 hours PRN SBP > 140  ondansetron Injectable 4 milliGRAM(s) IV Push every 6 hours PRN Nausea and/or Vomiting      RADIOLOGY & ADDITIONAL STUDIES:  No new neurosurgical imaging to review     < from: CT Head No Cont (01.19.22 @ 19:56) >  IMPRESSION: No change since 2:30 PM. Left parietal parenchymal hemorrhage   along the biopsy tract to the mass in the splenium of the corpus callosum.    --- End of Report ---    ADRIENNE RIDLEY MD; Attending Radiologist  This document has been electronically signed. Jan 20 2022  9:31AM    < end of copied text >

## 2022-01-21 NOTE — PROGRESS NOTE ADULT - SUBJECTIVE AND OBJECTIVE BOX
Patient seen this morning. Appears in good spirits. No complaints at this time.    REVIEW OF SYSTEMS  + memory loss, + loss of strength, + right hemineglect    FUNCTIONAL PROGRESS  1/20 OT    Bed Mobility: Supine to Sit:     · Level of Belva	moderate assist (50% patients effort)  · Physical Assist/Nonphysical Assist	1 person assist; verbal cues; nonverbal cues (demo/gestures)    Bed Mobility Analysis:     · Impairments Contributing to Impaired Bed Mobility	impaired balance; cognition; decreased strength; impaired sensory feedback; impaired coordination; impaired motor control; right neglect observed    Transfer: Bed to Chair:    Bed to Chair Transfer:    Transfer Skill: Bed to Chair   · Level of Belva	Assess.  Not safe due to impaired following directions, decreased functional use of right UE/LE due to neglect.    Transfer: Chair to Bed:     · Level of Belva	unable to perform    Bed to Chair Safety Analysis:     · Impairments Contributing to Impaired Transfers	impaired balance; cognition; impaired coordination; impaired sensory feedback; decreased strength  · Transfer Safety Concerns Noted	decreased safety awareness; decreased weight-shifting ability    Transfer: Sit to Stand:     · Level of Belva	moderate assist (50% patients effort)  · Physical Assist/Nonphysical Assist	2 person assist; verbal cues; nonverbal cues (demo/gestures)  · Assistive Device	attempted use of RW but pt not using right UE due to neglect, right knee buckling.  Pt unable to maintain grasp on RW due to neglect.    Sit/Stand Transfer Safety Analysis:     · Transfer Safety Concerns Noted	decreased weight-shifting ability; decreased sequencing ability; decreased safety awareness; right neglect  · Impairments Contributing to Impaired Transfers	impaired balance; cognition; impaired coordination; impaired sensory feedback; decreased strength    Transfer: Toilet Transfer:     · Level of Belva	assess as able    Sensory Examination:     Grossly Intact:   · Gross Sensory Examination	unable to assess - pt not following commands of assessment    · Balance Skills	static standing poor    1/20 PT  Transfer: Sit to Stand:     · Level of Belva	moderate assist (50% patients effort)  · Physical Assist/Nonphysical Assist	2 person assist  · Assistive Device	attempted with RW, improved with b/l HHA and right knee blocked    Transfer: Stand to Sit:     · Level of Belva	moderate assist (50% patients effort)  · Physical Assist/Nonphysical Assist	2 person assist    Sit/Stand Transfer Safety Analysis:     · Transfer Safety Concerns Noted	decreased weight-shifting ability  · Impairments Contributing to Impaired Transfers	impaired postural control; decreased strength    Gait Skills:     · Level of Belva	maximum assist (25% patients effort)  · Physical Assist/Nonphysical Assist	2 person assist  · Assistive Device	b/l HHA  · Gait Distance	2 side steps    Gait Analysis:     · Gait Deviations Noted	decreased weight-shifting ability; needs right knee blocked and assist to advance LEs    Stair Negotiation:     · Level of Belva	unable to perform      VITALS  T(C): 36.8 (01-21-22 @ 07:44), Max: 37.3 (01-21-22 @ 03:18)  HR: 96 (01-21-22 @ 08:00) (89 - 115)  BP: 156/71 (01-21-22 @ 08:00) (95/71 - 156/71)  RR: 15 (01-21-22 @ 08:00) (13 - 23)  SpO2: 98% (01-21-22 @ 08:00) (90% - 98%)  Wt(kg): --    MEDICATIONS   dexAMETHasone  Injectable 6 milliGRAM(s) every 6 hours  dextrose 40% Gel 15 Gram(s) once  dextrose 5%. 1000 milliLiter(s) <Continuous>  dextrose 50% Injectable 25 Gram(s) once  glucagon  Injectable 1 milliGRAM(s) once  hydrALAZINE Injectable 10 milliGRAM(s) every 2 hours PRN  insulin lispro (ADMELOG) corrective regimen sliding scale   every 6 hours  labetalol Injectable 10 milliGRAM(s) every 2 hours PRN  levETIRAcetam  IVPB 500 milliGRAM(s) every 12 hours  ondansetron Injectable 4 milliGRAM(s) every 6 hours PRN  pantoprazole  Injectable 40 milliGRAM(s) daily  sodium chloride 0.9%. 1000 milliLiter(s) <Continuous>      RECENT LABS/IMAGING                          12.3   13.41 )-----------( 332      ( 21 Jan 2022 03:58 )             36.9     01-21    139  |  106  |  16.5  ----------------------------<  133<H>  3.5   |  21.0<L>  |  0.37<L>    Ca    8.6      21 Jan 2022 03:58  Phos  2.2     01-21  Mg     2.3     01-21        MRI HEAD 1/14 - 1)  in addition to the corpus callosal infiltrative lesion, there are approximately 5 other lesions with associated edema and enhancement. Vasogenic edema is most extensive in the left parietal region. See full description above. Differential considerations, therefore, include the possibility of metastatic disease versus lymphoma rather than glioblastoma. Further assessment and correlation recommended.2)  no evidence of arterial or venous occlusive disease.    MRI ABD 1/14 - Left hepatic lobe atrophy and mild intrahepatic dilatation involving segments 2 and 3. Suspicion for stricturing mass at the junction of the medial and lateral left lobes. 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    MR Brain Stereotactic w/ IV Cont 1/18: 5.2 cm TRV x 3.3 cm AP x 3.1 cm CC solid enhancing lobulated mass in the posterior body of the corpus callosum. Moderate vasogenic edema is noted. A 1 cm enhancing lesion is seen in the LEFT parietal cortex and several subcentimeter enhancing lesions are seen in the bifrontal subcortical white matter.  FINDINGS suspicious for primary CNS lymphoma.    CT Head 1/19: 3.4 x 5.1 cm acute hemorrhage along the biopsy tract in the LEFT parietal lobe extending to the mass within the posterior corpus callosum. Pneumocephalus noted along the biopsy tract as well as within the mass. Overlying RIGHT parietal LEFT parietal craniotomy is noted.    CT Head 1/19: No change since 2:30 PM. Left parietal parenchymal hemorrhage along the biopsy tract to the mass in the splenium of the corpus callosum.      ----------------------------------------------------------------------------------------  PHYSICAL EXAM  Constitutional - NAD, Comfortable  HEENT - NCAT, EOMI  Neck - Supple, No limited ROM  Chest - Breathing comfortably, No wheezing  Cardiovascular - S1S2   Abdomen - Soft   Extremities - No C/C/E, No calf tenderness   Neurologic Exam -                    Cognitive - AAO to self, right neglect and with poor insight into deficits     Communication - Dysarthric     Cranial Nerves - left facial droop     Motor - follows commands on the left. able to move the right side (arm>leg) but with significant neglect and inattention component  Psychiatric - Calm  ----------------------------------------------------------------------------------------  ASSESSMENT/PLAN  81yFemale with functional deficits after 2 weeks of feeling off, now found to have a brain mass and IPH post-biopsy  Brain Mass - Jagdeep Deluca  IPH - Monitoring.   Sleep - Recommend melatonin 5 mg HS once able to establish oral intake  Oropharyngeal Dysphagia - SLP eval. Patient may need NGT vs PEG if fails  HTN - Hydralazine, Labetalol  Pain - Recommend Tylenol  DVT PPX - SCDs  Rehab - Patient currently being medically optimized in the ICU. Anticipate eventual acute rehab based upon her current functional deficits.    Will continue to follow. Rehab recommendations are dependent on how functional progress changes as well as how patient continues to participate and tolerate therapeutic interventions, which may change. Recommend ongoing mobilization by staff to maintain cardiopulmonary function and prevention of secondary complications related to debility. Discussed with rehab team.

## 2022-01-21 NOTE — SWALLOW BEDSIDE ASSESSMENT ADULT - ADDITIONAL RECOMMENDATIONS
See Scanned Documents.  
Marisel Ortiz was in the clinic today to see Wilian Glover MD.  Please note, his blood pressures were elevated x2 while in the clinic.    BP Readings from Last 1 Encounters:   03/12/19 0946 144/90   03/12/19 0945 164/90   03/12/19 0835 160/90     Encouraged patient to follow up with his PCP.  Please review with PCP and follow up with patient as appropriate.  
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  Patient was examined and determination is pending regarding his DOT card.  Documentation was requested regarding sleep apnea and high blood pressure readings in the clinic today.          
Will follow to re-assess swallow   Speech-language eval, pending MD order

## 2022-01-21 NOTE — SWALLOW BEDSIDE ASSESSMENT ADULT - SLP GENERAL OBSERVATIONS
Pt received & seen seated upright in bed, awake/alert, 02 sats on room air (93-95%), reduced cognition, ? aphasic, b/l wrist restraints, 0/10 pain

## 2022-01-22 NOTE — PROGRESS NOTE ADULT - ASSESSMENT
Assessment:  81y Female PMH HTN, [place] [facility] [place], also attests to feeling off for 1 month, now found with corpus callosal lesion, now s/p craniotomy for biopsy POD#3, postoperative course complicated with hemorrhage in surgical bed/biopsy tract. Repeat CTH stable. Exam continues to show improvements in speech, although patient remains agitated.     PLAN:  - Q2 neuro checks  - Decadron decreased to 4 Q6 2/2 agitation; protonix/ISS while on steroids  - Keppra switched to  BID 2/2 agitation  - SBP goal < 140; Hydralazine/labetalol PRN  - PT/OT/Speech, PM&R following  - Hold ACT/APT at this time, possible resumption of lovenox this evening   - Oncology to be consulted after final pathology report from biopsy  - GI consulted for liver mass, possible biopsy pending tumor pathology   - Supportive care/further medical management per NSICU  - Final plan pending AM discussion and rounds with attending

## 2022-01-22 NOTE — PROGRESS NOTE ADULT - SUBJECTIVE AND OBJECTIVE BOX
Patient is a 81y old  Female who presents with a chief complaint of Brain mass (20 Jan 2022 20:58)    HPI:  Patient is a 81y old  Female who presents with a chief complaint of brain mass.    HPI: Patient is a 80 y/o F PMH HTN [place] [facility] [place]. Patient states she came home from work, didn't feel well and then fell and hit her head into her bathtub. Unsure if she syncopized. Denies LOC. Patient called EMS herself. Patient states she's been feeling "off" for 2 weeks but can't describe what was wrong with her. She noticed today that she felt unsteady on her feet. Currently denies headache, use of blood thinners, cancer history, visual disturbance, nausea/vomiting, fever/chills, weakness, numbness/tingling. 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  (13 Jan 2022 20:29)      Interval history:  Patient seen and examined by  neurosurgery team. Patient is post op day 3 from tumor biopsy, course complicated by post-procedure L parietal lobe hematoma, repeat CTH stable. SG ANGELA drain removed yesterday. Patient remains intermittently agitated, requiring precedex drip and 1:1 for safety. Otherwise no acute events reported by staff.       Vital Signs Last 24 Hrs  T(C): 36.8 (21 Jan 2022 23:19), Max: 37.4 (21 Jan 2022 11:58)  T(F): 98.3 (21 Jan 2022 23:19), Max: 99.4 (21 Jan 2022 11:58)  HR: 60 (22 Jan 2022 00:00) (60 - 150)  BP: 95/54 (22 Jan 2022 00:00) (90/50 - 158/80)  BP(mean): 67 (22 Jan 2022 00:00) (66 - 145)  RR: 16 (22 Jan 2022 00:00) (13 - 29)  SpO2: 99% (22 Jan 2022 00:00) (93% - 99%)      Physical Exam:  Constitutional: lying in bed  Neuro  * Mental Status: Awake, alert, confused. Able to say name, repeat, speech more coherent. Some R neglect.  * Cranial Nerves: L gaze preference but able to cross midline. Mild R facial droop.   * Motor: RUE AG distally, LUE 5/5, RLE AG, LLE 5/5  * Sensory: Sensation grossly intact to noxious stimuli   * Reflexes: not assessed   Skin: incision inspected, no drainage noted, no erythema       LABS:                        12.3   13.41 )-----------( 332      ( 21 Jan 2022 03:58 )             36.9   01-21    139  |  106  |  16.5  ----------------------------<  133<H>  3.5   |  21.0<L>  |  0.37<L>    Ca    8.6      21 Jan 2022 03:58  Phos  2.2     01-21  Mg     2.3     01-21      Medications:  MEDICATIONS  (STANDING):  dexAMETHasone  Injectable 4 milliGRAM(s) IV Push every 6 hours  dexMEDEtomidine Infusion 0.1 MICROgram(s)/kG/Hr (1.57 mL/Hr) IV Continuous <Continuous>  dextrose 40% Gel 15 Gram(s) Oral once  dextrose 5%. 1000 milliLiter(s) (50 mL/Hr) IV Continuous <Continuous>  dextrose 50% Injectable 25 Gram(s) IV Push once  glucagon  Injectable 1 milliGRAM(s) IntraMuscular once  insulin lispro (ADMELOG) corrective regimen sliding scale   SubCutaneous every 6 hours  pantoprazole  Injectable 40 milliGRAM(s) IV Push daily  sodium chloride 0.9%. 1000 milliLiter(s) (60 mL/Hr) IV Continuous <Continuous>  valproate sodium IVPB 500 milliGRAM(s) IV Intermittent every 12 hours    MEDICATIONS  (PRN):  hydrALAZINE Injectable 10 milliGRAM(s) IV Push every 2 hours PRN SBP > 140  labetalol Injectable 10 milliGRAM(s) IV Push every 2 hours PRN SBP > 140  OLANZapine 2.5 milliGRAM(s) Oral every 12 hours PRN agitation  ondansetron Injectable 4 milliGRAM(s) IV Push every 6 hours PRN Nausea and/or Vomiting      RADIOLOGY & ADDITIONAL STUDIES:  No new neurosurgical imaging to review     < from: CT Head No Cont (01.19.22 @ 19:56) >  IMPRESSION: No change since 2:30 PM. Left parietal parenchymal hemorrhage   along the biopsy tract to the mass in the splenium of the corpus callosum.    --- End of Report ---    ADRIENNE RIDLEY MD; Attending Radiologist  This document has been electronically signed. Jan 20 2022  9:31AM    < end of copied text >

## 2022-01-22 NOTE — PROGRESS NOTE ADULT - SUBJECTIVE AND OBJECTIVE BOX
HPI: Patient is a 82 y/o F PMH HTN transferred from [facility] [place]p trip & fall. Patient states she came home from work, didn't feel well and then fell and hit her head into her bathtub. Unsure if she syncopized. Denies LOC. Patient called EMS herself. Patient states she's been feeling "off" for 2 weeks but can't describe what was wrong with her. She noticed today that she felt unsteady on her feet. Currently denies headache, use of blood thinners, cancer history, visual disturbance, nausea/vomiting, fever/chills, weakness, numbness/tingling. 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   (13 Jan 2022 20:29)  Patient admitted to neurosurgery service on stepdown unit 1/13/21. MRI brain 1/4 showed corpus callosal infiltrative lesion, as well as 5 additional lesions with edema and enhancement. MRI ABD 1/14 also performed after CT findings concerning for hepatic lesion, revealed L hepatic lobe atrophy and mild intrahepatic dilatation involving segments 2 and 3. Suspicion for stricturing mass at the junction of the medial and lateral left lobes. 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   Patient now POD#0 s/p L craniotomy for brain tumor biopsy. Subgaleal ANGELA drain placed. Patient extubated in OR and transferred to NSICU for further management and observation. Post-operatively, developed R sided weakness, aphasia, L gaze preference, R facial droop for which code stroke was called. STAT CTH obtained which revealed 3.4 x 5.1 cm acute hemorrhage along the biopsy tract in the L parietal lobe extending to the mass within the posterior corpus callosum.     VS, labs, imaging reviewed.  O/n events: none reported.   ROS limited due to pt's aphasia.    PHYSICAL EXAM:  GENERAL: Lying in ICU bed, NAD, cooperative.  HEAD: S/p L craniotomy, dressing C/D/I  MENTAL STATUS: Alert; Awake; Oriented x3; Opens eyes spontaneously; mild expressive aphasia, improving; FC.  CRANIAL NERVES: no gaze preference, crosses midline. PERRL. R facial droop.  MOTOR: MORROW spont.  CHEST/LUNG: Nonlabored breathing, no signs of respiratory distress   HEART: +S1/+S2; Regular rate and rhythm  ABDOMEN: Soft, nontender, nondistended  EXTREMITIES: No edema b/l  SKIN: Warm, dry

## 2022-01-22 NOTE — PROGRESS NOTE ADULT - ASSESSMENT
80 y/o F PMH HTN, presented with newly diagnosed brain masses, concern for both primary and mts nature. S/p recent fall.  S/p L craniotomy for brain tumor biopsy. Post-operatively with stroke-like Sx, Cth with acute hemorrhage along the biopsy tract in L parietal lobe extending to the mass within the posterior corpus callosum.   Possible sz. Negative cEEG now.   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    Plan:   neurochecks  cont VPA with the benefit of mood stabilization  cont Dexa, slow tapering  maintain -140; EKG reviewed - QTC WNL  -STAT CTH for any decline in mental status   -Maintain SpO2>92%  -diet as tolerated  -Protonix 40mg daily as on steroids  -SCDs b/l for DVT prophylaxis   -start SQL if stable CTh   -cont ISS      Pt is critically ill and at high risk of rapid deterioration/death due to abovementioned conditions.   Still requires critical care interventions - ongoing frequent evaluations, interventions and management adjustment by the Attending and ICU team, - and included review of relevant history, clinical examination, review of data and images, discussion of treatment with the multidisciplinary team and any consultants involved in this patient’s care as well as family discussion.

## 2022-01-23 NOTE — PROGRESS NOTE ADULT - ASSESSMENT
Assessment:  81y Female PMH HTN, transferred from [facility] [place] after trip and fall, also attests to feeling off for 1 month, now found with corpus callosal lesion, now s/p craniotomy for biopsy POD#4, postoperative course complicated with hemorrhage in surgical bed/biopsy tract. Exam continues to show improvements overall, less agitated. CTH yesterday grossly stable.     PLAN:  - Q2 neuro checks  - Decadron 4 Q6 with likely taper to begin today; protonix/ISS while on steroids  -  BID  - SBP goal < 140; Hydralazine/labetalol PRN  - PT/OT/Speech, PM&R following  - DVT prophylaxis: SCDs, lovenox to be started after official read of CTH   - Oncology to be consulted after final pathology report from biopsy  - GI consulted for liver mass, possible biopsy pending tumor pathology   - Supportive care/further medical management per NSICU  - Final plan pending AM discussion and rounds with attending Assessment:  81y Female PMH HTN, transferred from [facility] [place] after [place] and fall, also attests to feeling off for 1 month, now found with corpus callosal lesion, now s/p craniotomy for biopsy POD#4, postoperative course complicated with hemorrhage in surgical bed/biopsy tract. Exam continues to show improvements overall, less agitated. CTH yesterday grossly stable.     PLAN:  Neuro:  Q2. Awake, Ox1-2, L gaze preference (overcomes), exp aphasia improving. R facial, FC, R neglect but able to lift antigravity, L side moves spontaneously AG.       BID    Decadron 4 Q6   Zyprexa 2.5 Q12 PRN      CV: 100-140 |LDL 93   Norvasc 5   Hydralazine/Labetalol PRN     Resp: RA     GI: Puree with mildly thick liquids   Zofran PRN   Protonix 40     : Pam Garcia       Heme: SCDs   Lovenox SQ 40     ID:      Endo: A1C 5.0 | TSH 3.44   MISS AC/HS     Lines, Restraints & Skin:    1:1   B/L wrist

## 2022-01-23 NOTE — PROGRESS NOTE ADULT - SUBJECTIVE AND OBJECTIVE BOX
Patient is a 81y old  Female who presents with a chief complaint of Brain mass (20 Jan 2022 20:58)    HPI:  Patient is a 81y old  Female who presents with a chief complaint of brain mass.    HPI: Patient is a 82 y/o F PMH HTN transferred from [facility] [place] s/p trip & fall. Patient states she came home from work, didn't feel well and then fell and hit her head into her bathtub. Unsure if she syncopized. Denies LOC. Patient called EMS herself. Patient states she's been feeling "off" for 2 weeks but can't describe what was wrong with her. She noticed today that she felt unsteady on her feet. Currently denies headache, use of blood thinners, cancer history, visual disturbance, nausea/vomiting, fever/chills, weakness, numbness/tingling. 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  (13 Jan 2022 20:29)      Interval history:  Patient seen and examined by  neurosurgery team. Patient is post op day 4 from tumor biopsy, course complicated by post-procedure L parietal lobe hematoma, repeat CTH stable. Repeat CTH yesterday grossly stable, awaiting official read. Patient complaining of headache overnight, relieved by oxycodone. No other acute events reported.       Vital Signs Last 24 Hrs  T(C): 37.2 (23 Jan 2022 00:18), Max: 37.2 (23 Jan 2022 00:18)  T(F): 99 (23 Jan 2022 00:18), Max: 99 (23 Jan 2022 00:18)  HR: 77 (23 Jan 2022 00:00) (60 - 92)  BP: 139/65 (23 Jan 2022 00:00) (100/51 - 160/82)  BP(mean): 87 (23 Jan 2022 00:00) (65 - 108)  RR: 15 (23 Jan 2022 00:00) (12 - 26)  SpO2: 97% (23 Jan 2022 00:00) (93% - 100%)      Physical Exam:  Constitutional: lying in bed  Neuro  * Mental Status: Awake, alert, intermittent confusion. Able to say name, repeat, speech with continued improvement. Some R neglect.  * Cranial Nerves: L gaze preference but able to cross midline. Mild R facial droop.   * Motor: RUE 4-/5, LUE 5/5, RLE 3/5, LLE 5/5  * Sensory: Sensation grossly intact to noxious stimuli   * Reflexes: not assessed   Skin: incision inspected, no drainage noted, no erythema       LABS:                        12.4   10.37 )-----------( 273      ( 22 Jan 2022 03:55 )             36.9   01-22    140  |  106  |  21.7<H>  ----------------------------<  118<H>  3.5   |  19.0<L>  |  0.41<L>    Ca    8.2<L>      22 Jan 2022 03:55  Phos  3.3     01-22  Mg     2.4     01-22    TPro  5.5<L>  /  Alb  3.6  /  TBili  0.8  /  DBili  x   /  AST  27  /  ALT  39<H>  /  AlkPhos  79  01-22      Medications:  MEDICATIONS  (STANDING):  dexAMETHasone  Injectable 4 milliGRAM(s) IV Push every 6 hours  dextrose 40% Gel 15 Gram(s) Oral once  dextrose 5%. 1000 milliLiter(s) (50 mL/Hr) IV Continuous <Continuous>  dextrose 50% Injectable 25 Gram(s) IV Push once  glucagon  Injectable 1 milliGRAM(s) IntraMuscular once  insulin lispro (ADMELOG) corrective regimen sliding scale   SubCutaneous every 6 hours  pantoprazole  Injectable 40 milliGRAM(s) IV Push daily  sodium chloride 0.9%. 1000 milliLiter(s) (60 mL/Hr) IV Continuous <Continuous>  valproate sodium IVPB 500 milliGRAM(s) IV Intermittent every 12 hours    MEDICATIONS  (PRN):  acetaminophen     Tablet .. 650 milliGRAM(s) Oral every 6 hours PRN Mild Pain (1 - 3)  hydrALAZINE Injectable 10 milliGRAM(s) IV Push every 2 hours PRN SBP > 140  labetalol Injectable 10 milliGRAM(s) IV Push every 2 hours PRN SBP > 140  OLANZapine 2.5 milliGRAM(s) Oral every 12 hours PRN agitation  ondansetron Injectable 4 milliGRAM(s) IV Push every 6 hours PRN Nausea and/or Vomiting  oxyCODONE    IR 5 milliGRAM(s) Oral every 6 hours PRN Moderate Pain (4 - 6)  oxyCODONE    IR 10 milliGRAM(s) Oral every 6 hours PRN Severe Pain (7 - 10)      RADIOLOGY & ADDITIONAL STUDIES:  CTH performed yesterday afternoon, no official read, appears grossly stable     < from: CT Head No Cont (01.19.22 @ 19:56) >  IMPRESSION: No change since 2:30 PM. Left parietal parenchymal hemorrhage   along the biopsy tract to the mass in the splenium of the corpus callosum.    --- End of Report ---    ADRIENNE RIDLEY MD; Attending Radiologist  This document has been electronically signed. Jan 20 2022  9:31AM    < end of copied text >   Patient is a 81y old  Female who presents with a chief complaint of Brain mass (20 Jan 2022 20:58)    HPI:  Patient is a 81y old  Female who presents with a chief complaint of brain mass.    HPI: Patient is a 82 y/o F PMH HTN transferred from [facility]/p trip & fall. Patient states she came home from work, didn't feel well and then fell and hit her head into her bathtub. Unsure if she syncopized. Denies LOC. Patient called EMS herself. Patient states she's been feeling "off" for 2 weeks but can't describe what was wrong with her. She noticed today that she felt unsteady on her feet. Currently denies headache, use of blood thinners, cancer history, visual disturbance, nausea/vomiting, fever/chills, weakness, numbness/tingling. 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  (13 Jan 2022 20:29)      Interval history:  Patient seen and examined by  neurosurgery team. Patient is post op day 4 from tumor biopsy, course complicated by post-procedure L parietal lobe hematoma, repeat CTH stable. Repeat CTH yesterday grossly stable, awaiting official read. Patient complaining of headache overnight, relieved by oxycodone. No other acute events reported.       Vital Signs Last 24 Hrs  T(C): 37.2 (23 Jan 2022 00:18), Max: 37.2 (23 Jan 2022 00:18)  T(F): 99 (23 Jan 2022 00:18), Max: 99 (23 Jan 2022 00:18)  HR: 77 (23 Jan 2022 00:00) (60 - 92)  BP: 139/65 (23 Jan 2022 00:00) (100/51 - 160/82)  BP(mean): 87 (23 Jan 2022 00:00) (65 - 108)  RR: 15 (23 Jan 2022 00:00) (12 - 26)  SpO2: 97% (23 Jan 2022 00:00) (93% - 100%)      Physical Exam:  Constitutional: lying in bed  Neuro  * Mental Status: Awake, alert, intermittent confusion. Able to say name, repeat, speech with continued improvement. Some R neglect.  * Cranial Nerves: L gaze preference but able to cross midline. Mild R facial droop.   * Motor: RUE 4+/5, LUE 5/5, RLE 4/5, LLE 5/5, RUE sensory drift   * Sensory: Sensation grossly intact to noxious stimuli   * Reflexes: not assessed   Skin: incision inspected, no drainage noted, no erythema       LABS:                        12.4   10.37 )-----------( 273      ( 22 Jan 2022 03:55 )             36.9   01-22    140  |  106  |  21.7<H>  ----------------------------<  118<H>  3.5   |  19.0<L>  |  0.41<L>    Ca    8.2<L>      22 Jan 2022 03:55  Phos  3.3     01-22  Mg     2.4     01-22    TPro  5.5<L>  /  Alb  3.6  /  TBili  0.8  /  DBili  x   /  AST  27  /  ALT  39<H>  /  AlkPhos  79  01-22      Medications:  MEDICATIONS  (STANDING):  dexAMETHasone  Injectable 4 milliGRAM(s) IV Push every 6 hours  dextrose 40% Gel 15 Gram(s) Oral once  dextrose 5%. 1000 milliLiter(s) (50 mL/Hr) IV Continuous <Continuous>  dextrose 50% Injectable 25 Gram(s) IV Push once  glucagon  Injectable 1 milliGRAM(s) IntraMuscular once  insulin lispro (ADMELOG) corrective regimen sliding scale   SubCutaneous every 6 hours  pantoprazole  Injectable 40 milliGRAM(s) IV Push daily  sodium chloride 0.9%. 1000 milliLiter(s) (60 mL/Hr) IV Continuous <Continuous>  valproate sodium IVPB 500 milliGRAM(s) IV Intermittent every 12 hours    MEDICATIONS  (PRN):  acetaminophen     Tablet .. 650 milliGRAM(s) Oral every 6 hours PRN Mild Pain (1 - 3)  hydrALAZINE Injectable 10 milliGRAM(s) IV Push every 2 hours PRN SBP > 140  labetalol Injectable 10 milliGRAM(s) IV Push every 2 hours PRN SBP > 140  OLANZapine 2.5 milliGRAM(s) Oral every 12 hours PRN agitation  ondansetron Injectable 4 milliGRAM(s) IV Push every 6 hours PRN Nausea and/or Vomiting  oxyCODONE    IR 5 milliGRAM(s) Oral every 6 hours PRN Moderate Pain (4 - 6)  oxyCODONE    IR 10 milliGRAM(s) Oral every 6 hours PRN Severe Pain (7 - 10)      RADIOLOGY & ADDITIONAL STUDIES:  CTH performed yesterday afternoon, no official read, appears grossly stable     < from: CT Head No Cont (01.19.22 @ 19:56) >  IMPRESSION: No change since 2:30 PM. Left parietal parenchymal hemorrhage   along the biopsy tract to the mass in the splenium of the corpus callosum.    --- End of Report ---    ADRIENNE RIDLEY MD; Attending Radiologist  This document has been electronically signed. Jan 20 2022  9:31AM    < end of copied text >   Transfer Note:   Patient is a 81y old  Female who presents with a chief complaint of Brain mass (20 Jan 2022 20:58)    HPI:  Patient is a 81y old  Female who presents with a chief complaint of brain mass.    HPI: Patient is a 80 y/o F PMH HTN transferred from [facility] [place]/p trip & fall. Patient states she came home from work, didn't feel well and then fell and hit her head into her bathtub. Unsure if she syncopized. Denies LOC. Patient called EMS herself. Patient states she's been feeling "off" for 2 weeks but can't describe what was wrong with her. She noticed today that she felt unsteady on her feet. Currently denies headache, use of blood thinners, cancer history, visual disturbance, nausea/vomiting, fever/chills, weakness, numbness/tingling. 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  (13 Jan 2022 20:29)    PMH/PSH:   HTN     Allergies:   Latex, bananas, strawberries, kiwi, liv     Hospital Course:     1/13: Admitted to nsx service.    1/19: Taken to OR for tumor biopsy, frozen c/w metastatic. Post-op code stroke called for aphasia and R neglect, found to have large bleed. Dex 10 given. Haldol 2.5 and Ativan given. Precedex gtt started for anxiety.    1/20: Decadron incr. No sz seen on EEG. Precedex D/C. EEG D/C.    1/21: SG ANGELA drain D/C. Agitated – Precdex gtt and Haldol 2.5. Dec decr to 4q6. Keppra switched to VPA. TOV. Ativan 1.   1/22: ON zac, precedex off, more calm.    1/23: Failed TOV, ortega. Cardura added. HTN, Norvasc added. SQL started     Interval history:  Patient seen and examined by  neurosurgery team. Patient is post op day 4 from tumor biopsy, course complicated by post-procedure L parietal lobe hematoma, repeat CTH stable. Repeat CTH yesterday grossly stable, awaiting official read. Patient complaining of headache overnight, relieved by oxycodone. No other acute events reported.       Vital Signs Last 24 Hrs  T(C): 37.2 (23 Jan 2022 00:18), Max: 37.2 (23 Jan 2022 00:18)  T(F): 99 (23 Jan 2022 00:18), Max: 99 (23 Jan 2022 00:18)  HR: 77 (23 Jan 2022 00:00) (60 - 92)  BP: 139/65 (23 Jan 2022 00:00) (100/51 - 160/82)  BP(mean): 87 (23 Jan 2022 00:00) (65 - 108)  RR: 15 (23 Jan 2022 00:00) (12 - 26)  SpO2: 97% (23 Jan 2022 00:00) (93% - 100%)      Physical Exam:  Constitutional: lying in bed  Neuro  * Mental Status: Awake, alert, intermittent confusion. Able to say name, repeat, speech with continued improvement. Some R neglect.  * Cranial Nerves: L gaze preference but able to cross midline. Mild R facial droop.   * Motor: RUE 4+/5, LUE 5/5, RLE 4/5, LLE 5/5, RUE sensory drift   * Sensory: Sensation grossly intact to noxious stimuli   * Reflexes: not assessed   Skin: incision inspected, no drainage noted, no erythema       LABS:                        12.4   10.37 )-----------( 273      ( 22 Jan 2022 03:55 )             36.9   01-22    140  |  106  |  21.7<H>  ----------------------------<  118<H>  3.5   |  19.0<L>  |  0.41<L>    Ca    8.2<L>      22 Jan 2022 03:55  Phos  3.3     01-22  Mg     2.4     01-22    TPro  5.5<L>  /  Alb  3.6  /  TBili  0.8  /  DBili  x   /  AST  27  /  ALT  39<H>  /  AlkPhos  79  01-22      Medications:  MEDICATIONS  (STANDING):  dexAMETHasone  Injectable 4 milliGRAM(s) IV Push every 6 hours  dextrose 40% Gel 15 Gram(s) Oral once  dextrose 5%. 1000 milliLiter(s) (50 mL/Hr) IV Continuous <Continuous>  dextrose 50% Injectable 25 Gram(s) IV Push once  glucagon  Injectable 1 milliGRAM(s) IntraMuscular once  insulin lispro (ADMELOG) corrective regimen sliding scale   SubCutaneous every 6 hours  pantoprazole  Injectable 40 milliGRAM(s) IV Push daily  sodium chloride 0.9%. 1000 milliLiter(s) (60 mL/Hr) IV Continuous <Continuous>  valproate sodium IVPB 500 milliGRAM(s) IV Intermittent every 12 hours    MEDICATIONS  (PRN):  acetaminophen     Tablet .. 650 milliGRAM(s) Oral every 6 hours PRN Mild Pain (1 - 3)  hydrALAZINE Injectable 10 milliGRAM(s) IV Push every 2 hours PRN SBP > 140  labetalol Injectable 10 milliGRAM(s) IV Push every 2 hours PRN SBP > 140  OLANZapine 2.5 milliGRAM(s) Oral every 12 hours PRN agitation  ondansetron Injectable 4 milliGRAM(s) IV Push every 6 hours PRN Nausea and/or Vomiting  oxyCODONE    IR 5 milliGRAM(s) Oral every 6 hours PRN Moderate Pain (4 - 6)  oxyCODONE    IR 10 milliGRAM(s) Oral every 6 hours PRN Severe Pain (7 - 10)      RADIOLOGY & ADDITIONAL STUDIES:  CTH performed yesterday afternoon, no official read, appears grossly stable     < from: CT Head No Cont (01.19.22 @ 19:56) >  IMPRESSION: No change since 2:30 PM. Left parietal parenchymal hemorrhage   along the biopsy tract to the mass in the splenium of the corpus callosum.    --- End of Report ---    ADRIENNE RIDLEY MD; Attending Radiologist  This document has been electronically signed. Jan 20 2022  9:31AM    < end of copied text >

## 2022-01-23 NOTE — PROGRESS NOTE ADULT - SUBJECTIVE AND OBJECTIVE BOX
********* TRANSFER NOTE *************    HPI: Patient is a 82 y/o F PMH HTN transferred from [facility] [place] s/p trip & fall. Patient states she came home from work, didn't feel well and then fell and hit her head into her bathtub. Unsure if she syncopized. Denies LOC. Patient called EMS herself. Patient states she's been feeling "off" for 2 weeks but can't describe what was wrong with her. She noticed today that she felt unsteady on her feet. Currently denies headache, use of blood thinners, cancer history, visual disturbance, nausea/vomiting, fever/chills, weakness, numbness/tingling. 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   (13 Jan 2022 20:29)  Patient admitted to neurosurgery service on stepdown unit 1/13/21. MRI brain 1/4 showed corpus callosal infiltrative lesion, as well as 5 additional lesions with edema and enhancement. MRI ABD 1/14 also performed after CT findings concerning for hepatic lesion, revealed L hepatic lobe atrophy and mild intrahepatic dilatation involving segments 2 and 3. Suspicion for stricturing mass at the junction of the medial and lateral left lobes. 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   Patient now  s/p L craniotomy for brain tumor biopsy. Subgaleal ANGELA drain placed. Patient extubated in OR and transferred to NSICU for further management and observation. Post-operatively, developed R sided weakness, aphasia, L gaze preference, R facial droop for which code stroke was called. STAT CTH obtained which revealed 3.4 x 5.1 cm acute hemorrhage along the biopsy tract in the L parietal lobe extending to the mass within the posterior corpus callosum. (1/22/2022)    VS, labs, imaging reviewed.  O/n events: none reported. Off Precedex.  ROS limited due to pt's aphasia.    PHYSICAL EXAM:  GENERAL: Lying in ICU bed, NAD, cooperative.  HEAD: S/p L craniotomy, dressing C/D/I  MENTAL STATUS: Alert; Awake; Oriented x3; Opens eyes spontaneously; mild expressive aphasia, improving; FC.  CRANIAL NERVES: no gaze preference, crosses midline. PERRL. R facial droop.  MOTOR: MORROW spont.  CHEST/LUNG: Nonlabored breathing, no signs of respiratory distress   HEART: +S1/+S2; Regular rate and rhythm  ABDOMEN: Soft, nontender, nondistended  EXTREMITIES: No edema b/l  SKIN: Warm, dry

## 2022-01-23 NOTE — CHART NOTE - NSCHARTNOTEFT_GEN_A_CORE
Source: Patient [ ]  Family [ ]   other [ x]    Current Diet: Diet, Pureed:   Mildly Thick Liquids (MILDTHICKLIQS) (01-21-22 @ 09:58)    PO intake:  < 50% [ x]   50-75%  [ ]   %  [ ]  other :    Current Weight:   (1/22) 128.7 lbs  (1/21) 136.2 lbs  (1/18) 140.4 lbs  (1/17) 141.3 lbs  (1/15) 135.5 lbs  Obtain daily wts, continue to monitor  No edema noted    Pertinent Medications: MEDICATIONS  (STANDING):  amLODIPine   Tablet 5 milliGRAM(s) Oral daily  dexAMETHasone  Injectable 4 milliGRAM(s) IV Push every 6 hours  dextrose 40% Gel 15 Gram(s) Oral once  dextrose 5%. 1000 milliLiter(s) (50 mL/Hr) IV Continuous <Continuous>  dextrose 5%. 1000 milliLiter(s) (100 mL/Hr) IV Continuous <Continuous>  dextrose 50% Injectable 25 Gram(s) IV Push once  dextrose 50% Injectable 12.5 Gram(s) IV Push once  dextrose 50% Injectable 25 Gram(s) IV Push once  doxazosin 2 milliGRAM(s) Oral daily  enoxaparin Injectable 40 milliGRAM(s) SubCutaneous at bedtime  glucagon  Injectable 1 milliGRAM(s) IntraMuscular once  insulin lispro (ADMELOG) corrective regimen sliding scale   SubCutaneous Before meals and at bedtime  pantoprazole  Injectable 40 milliGRAM(s) IV Push daily  valproate sodium IVPB 500 milliGRAM(s) IV Intermittent every 12 hours    MEDICATIONS  (PRN):  acetaminophen     Tablet .. 650 milliGRAM(s) Oral every 6 hours PRN Mild Pain (1 - 3)  hydrALAZINE Injectable 10 milliGRAM(s) IV Push every 2 hours PRN SBP > 140  labetalol Injectable 10 milliGRAM(s) IV Push every 2 hours PRN SBP > 140  OLANZapine 2.5 milliGRAM(s) Oral every 12 hours PRN agitation  ondansetron Injectable 4 milliGRAM(s) IV Push every 6 hours PRN Nausea and/or Vomiting  oxyCODONE    IR 5 milliGRAM(s) Oral every 6 hours PRN Moderate Pain (4 - 6)  oxyCODONE    IR 10 milliGRAM(s) Oral every 6 hours PRN Severe Pain (7 - 10)    Pertinent Labs: CBC Full  -  ( 23 Jan 2022 03:53 )  WBC Count : 12.55 K/uL  RBC Count : 3.97 M/uL  Hemoglobin : 12.4 g/dL  Hematocrit : 37.1 %  Platelet Count - Automated : 294 K/uL  Mean Cell Volume : 93.5 fl  Mean Cell Hemoglobin : 31.2 pg  Mean Cell Hemoglobin Concentration : 33.4 gm/dL  Auto Neutrophil # : x  Auto Lymphocyte # : x  Auto Monocyte # : x  Auto Eosinophil # : x  Auto Basophil # : x  Auto Neutrophil % : x  Auto Lymphocyte % : x  Auto Monocyte % : x  Auto Eosinophil % : x  Auto Basophil % : x  01-23 Na138 mmol/L Glu 109 mg/dL<H> K+ 4.3 mmol/L Cr  0.22 mg/dL<L> BUN 16.1 mg/dL Phos 2.5 mg/dL Alb n/a   PAB n/a       Skin: surgical incision     Nutrition focused physical exam conducted - found signs of malnutrition [ x]absent [ ]present    Subcutaneous fat loss: [ ] Orbital fat pads region, [ ]Buccal fat region, [ ]Triceps region,  [ ]Ribs region    Muscle wasting: [ ]Temples region, [ ]Clavicle region, [ ]Shoulder region, [ ]Scapula region, [ ]Interosseous region,  [ ]thigh region, [ ]Calf region    Estimated Needs:   [x ] no change since previous assessment  [ ] recalculated:     Current Nutrition Diagnosis: Pt remains at high nutrition risk secondary to Increased Nutrient Needs related to increased physiological demand for nutrient as evidenced by brain mass and liver lesion. S/p L craniotomy for brain tumor biopsy. s/p craniotomy for biopsy POD#4, postoperative course complicated with hemorrhage in surgical bed/biopsy tract. SLP recommended puree, mildly thick fluids (1/21). Per documentation, pt with poor po intake consuming <25% of meals. Last documented BM 1/18. RD to remain available.     Recommendations:   1) Add Ensure Pudding TID to optimize po intake and provide an additional 170kcal, 4g protein per serving   2) Add Ensure Enlive TID to optimize po intake and provide an additional 350kcal, 20g protein per serving   3) Monitor wts and labs    Monitoring and Evaluation:   [x ] PO intake [ x] Tolerance to diet prescription [X] Weights  [X] Follow up per protocol [X] Labs:

## 2022-01-23 NOTE — PROGRESS NOTE ADULT - ASSESSMENT
80 y/o F PMH HTN, presented with newly diagnosed brain masses, concern for both primary and mts nature. S/p recent fall.  S/p L craniotomy for brain tumor biopsy. Post-operatively with stroke-like Sx, Cth with acute hemorrhage along the biopsy tract in L parietal lobe extending to the mass within the posterior corpus callosum.   Possible sz. Negative cEEG.   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    Plan:   neurochecks  cont VPA with the benefit of mood stabilization  cont Dexa, slow tapering per NSGy  maintain -140; EKG reviewed - QTC WNL  -using Zyprexa 2.5 IM PRN q12hrs if any epiosde of agitation  -STAT CTH for any decline in mental status   -Maintain SpO2>92%  -diet as tolerated  -Protonix 40mg daily as on steroids  -SCDs b/l for DVT prophylaxis   -SQL  -cont ISS  -stable to be downgraded to SDU; NSGy involved

## 2022-01-24 NOTE — PROGRESS NOTE ADULT - PROBLEM SELECTOR PLAN 1
MRI abdomen reported as left hepatic lobe atrophy and mild intrahepatic dilatation, suspicion for stricturing mass at the junction of the medial and lateral left lobes.    AFP and CEA negative  LFTs and INR normal. No cirrhosis noted  Will plan for liver biopsy once optimized from the neurosurgery  Last INR normal  (01/9)  Will discuss with IR today

## 2022-01-24 NOTE — PROGRESS NOTE ADULT - SUBJECTIVE AND OBJECTIVE BOX
INTERVAL HPI/OVERNIGHT EVENTS:  Pt admitted on 1/13 for generalize malaise with diff with ambulation, near syncope which lead to the pt striking head. Neg LOC  POD # 6 for a left craniotomy for brain tumor - post op the patient was noted for rt sided weakness and aphasic.   Today the patient seen and she is conversant. Pt states that she would like a sandwich to eat.   At present she has a puree meal with thicken liquids.  Pt denies headache at this time. Pt states that her memory is ok.     MEDICATIONS  (STANDING):  amLODIPine   Tablet 5 milliGRAM(s) Oral daily  dexAMETHasone  Injectable 4 milliGRAM(s) IV Push every 6 hours  dextrose 40% Gel 15 Gram(s) Oral once  dextrose 5%. 1000 milliLiter(s) (50 mL/Hr) IV Continuous <Continuous>  dextrose 5%. 1000 milliLiter(s) (100 mL/Hr) IV Continuous <Continuous>  dextrose 50% Injectable 25 Gram(s) IV Push once  dextrose 50% Injectable 12.5 Gram(s) IV Push once  dextrose 50% Injectable 25 Gram(s) IV Push once  doxazosin 2 milliGRAM(s) Oral daily  enoxaparin Injectable 40 milliGRAM(s) SubCutaneous at bedtime  glucagon  Injectable 1 milliGRAM(s) IntraMuscular once  insulin lispro (ADMELOG) corrective regimen sliding scale   SubCutaneous Before meals and at bedtime  pantoprazole  Injectable 40 milliGRAM(s) IV Push daily  valproate sodium IVPB 500 milliGRAM(s) IV Intermittent every 12 hours    MEDICATIONS  (PRN):  acetaminophen     Tablet .. 650 milliGRAM(s) Oral every 6 hours PRN Mild Pain (1 - 3)  hydrALAZINE Injectable 10 milliGRAM(s) IV Push every 2 hours PRN SBP > 140  labetalol Injectable 10 milliGRAM(s) IV Push every 2 hours PRN SBP > 140  OLANZapine 2.5 milliGRAM(s) Oral every 12 hours PRN agitation  ondansetron Injectable 4 milliGRAM(s) IV Push every 6 hours PRN Nausea and/or Vomiting  oxyCODONE    IR 5 milliGRAM(s) Oral every 6 hours PRN Moderate Pain (4 - 6)  oxyCODONE    IR 10 milliGRAM(s) Oral every 6 hours PRN Severe Pain (7 - 10)      Allergies  Bananas (Angioedema; Anaphylaxis; Short breath)  Kiwi (Angioedema; Anaphylaxis; Short breath)  latex (Unknown)  [place] (Angioedema; Anaphylaxis; Short breath)  No Known Drug Allergies  strawberry (Angioedema; Anaphylaxis; Short breath)    Intolerances      Vital Signs Last 24 Hrs  T(C): 36.6 (24 Jan 2022 08:00), Max: 37.1 (23 Jan 2022 19:42)  T(F): 97.8 (24 Jan 2022 08:00), Max: 98.8 (23 Jan 2022 19:42)  HR: 83 (24 Jan 2022 08:00) (65 - 90)  BP: 125/68 (24 Jan 2022 08:00) (125/68 - 163/85)  BP(mean): 87 (24 Jan 2022 08:00) (75 - 117)  RR: 18 (24 Jan 2022 08:00) (12 - 25)  SpO2: 94% (24 Jan 2022 08:00) (92% - 98%) BMI (kg/m2): 23 (01-19-22 @ 12:00)      PHYSICAL EXAM  GENERAL: NAD,   HEAD:  Atraumatic, incision clean and dry intact neg erythema,   EYES: EOMI, PERRLA, conjunctiva and sclera clear  ENMT: No tonsillar erythema, exudates, or enlargement; Moist mucous membranes, Good dentition, No lesions  NECK: Supple,  NERVOUS SYSTEM:  Alert & Oriented X3, Good concentration; Motor Strength 5/5 B/L upper and lower extremities; DTRs 2+ intact and symmetric  EXTREMITIES:  2+ Peripheral Pulses, No edema  LYMPH: No lymphadenopathy noted  SKIN: No rashes or lesions      LABS:                          13.7   11.61 )-----------( 273      ( 24 Jan 2022 10:19 )             40.8     01-23    138  |  106  |  16.1  ----------------------------<  109<H>  4.3   |  18.0<L>  |  0.22<L>    Ca    8.0<L>      23 Jan 2022 03:53  Phos  2.5     01-23  Mg     2.1     01-23      I&O's Detail    23 Jan 2022 07:01  -  24 Jan 2022 07:00  --------------------------------------------------------  IN:    IV PiggyBack: 100 mL    Oral Fluid: 680 mL  Total IN: 780 mL    OUT:    Indwelling Catheter - Urethral (mL): 2420 mL  Total OUT: 2420 mL    Total NET: -1640 mL    RADIOLOGY & ADDITIONAL TESTS:  < from: CT Head No Cont (01.22.22 @ 11:56) >  ACC: [id number] EXAM:  CT BRAIN                        PROCEDURE DATE:  01/22/2022    IMPRESSION:  STABLE HEMORRHAGE LEFT PARIETAL LOBE ALONG A BIOPSY TRACT LEADING TO A   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    --- End of Report ---  < end of copied text >

## 2022-01-24 NOTE — PROGRESS NOTE ADULT - SUBJECTIVE AND OBJECTIVE BOX
Chief Complaint:  Patient is a 81y old  Female who presents with a chief complaint of Brain mass. Being seen in follow-up consultation for radiology finding of liver lesion.     Interval Events / Subjective: Patient seen and evaluated at bedside, reporting no complaints, no overnight events. Patient is now s/p Left craniotomy for brain tumor biopsy. Post-operative course noted for Right sided weakness, aphasia, L gaze preference, R facial droop for which code stroke was called. STAT CTH obtained which revealed 3.4 x 5.1 cm acute hemorrhage along the biopsy tract in the L parietal lobe extending to the mass within the posterior corpus callosum. (1/22/2022). Patient denies nausea, vomiting, abdominal pain, chest pain, shortness of breath, hematemesis, hematochezia, melena.     Review of Systems:  · ENMT: negative  · Respiratory and Thorax: negative  · Cardiovascular: negative  · Gastrointestinal: see above.  · Genitourinary:	negative  · Musculoskeletal: negative  · Neurological: negative  · Psychiatric: negative  · Hematology/Lymphatics: negative  · Endocrine: negative      PAST MEDICAL/SURGICAL HISTORY:  HTN (hypertension)    No significant past surgical history      MEDICATIONS  (STANDING):  amLODIPine   Tablet 5 milliGRAM(s) Oral daily  dexAMETHasone     Tablet 3 milliGRAM(s) Oral every 6 hours  dextrose 40% Gel 15 Gram(s) Oral once  dextrose 5%. 1000 milliLiter(s) (50 mL/Hr) IV Continuous <Continuous>  dextrose 5%. 1000 milliLiter(s) (100 mL/Hr) IV Continuous <Continuous>  dextrose 50% Injectable 25 Gram(s) IV Push once  dextrose 50% Injectable 12.5 Gram(s) IV Push once  dextrose 50% Injectable 25 Gram(s) IV Push once  doxazosin 2 milliGRAM(s) Oral daily  enoxaparin Injectable 40 milliGRAM(s) SubCutaneous at bedtime  glucagon  Injectable 1 milliGRAM(s) IntraMuscular once  insulin lispro (ADMELOG) corrective regimen sliding scale   SubCutaneous Before meals and at bedtime  pantoprazole  Injectable 40 milliGRAM(s) IV Push daily  valproate sodium IVPB 500 milliGRAM(s) IV Intermittent every 12 hours    MEDICATIONS  (PRN):  acetaminophen     Tablet .. 650 milliGRAM(s) Oral every 6 hours PRN Mild Pain (1 - 3)  hydrALAZINE Injectable 10 milliGRAM(s) IV Push every 2 hours PRN SBP > 140  labetalol Injectable 10 milliGRAM(s) IV Push every 2 hours PRN SBP > 140  OLANZapine 2.5 milliGRAM(s) Oral every 12 hours PRN agitation  ondansetron Injectable 4 milliGRAM(s) IV Push every 6 hours PRN Nausea and/or Vomiting  oxyCODONE    IR 5 milliGRAM(s) Oral every 6 hours PRN Moderate Pain (4 - 6)  oxyCODONE    IR 10 milliGRAM(s) Oral every 6 hours PRN Severe Pain (7 - 10)    Bananas (Angioedema; Anaphylaxis; Short breath)  Kiwi (Angioedema; Anaphylaxis; Short breath)  latex (Unknown)  [place] (Angioedema; Anaphylaxis; Short breath)  No Known Drug Allergies  strawberry (Angioedema; Anaphylaxis; Short breath)    T(C): 36.6 (01-24-22 @ 08:00), Max: 37.1 (01-23-22 @ 19:42)  HR: 83 (01-24-22 @ 08:00) (74 - 90)  BP: 125/68 (01-24-22 @ 08:00) (125/68 - 163/85)  RR: 18 (01-24-22 @ 08:00) (12 - 25)  SpO2: 94% (01-24-22 @ 08:00) (92% - 98%) on room air      23 Jan 2022 07:01  -  24 Jan 2022 07:00  --------------------------------------------------------  IN: 780 mL / OUT: 2420 mL / NET: -1640 mL      PHYSICAL EXAM:    Constitutional: No acute distress  Neuro: Awake alert, oriented to person, place and situation  HEENT: PERRL, anicteric sclerae, oral mucosa pink and moist  Neck: supple, no JVD  CV: +S1S2,   Pulm/chest: lung sounds CTA and equal bilaterally, no accessory muscle use noted  Abd: soft, NT, ND, +BS  Ext: no Cyanosis, clubbing or edema  Skin: warm, well perfused, no jaundice   Psych: calm, appropriate affect        LABS:               13.7   11.61 )-----------( 273      ( 01-24 @ 10:19 )             40.8                12.4   12.55 )-----------( 294      ( 01-23 @ 03:53 )             37.1                12.4   10.37 )-----------( 273      ( 01-22 @ 03:55 )             36.9       01-24    134<L>  |  102  |  19.4  ----------------------------<  239<H>  3.5   |  20.0<L>  |  0.44<L>    Ca    8.2<L>      24 Jan 2022 10:19  Phos  2.4     01-24  Mg     2.1     01-24      < from: MR Abdomen w/wo IV Cont (01.14.22 @ 19:55) >  FINDINGS:  LOWER CHEST: Within normal limits.    LIVER/BILE DUCTS: Numerous liver cysts. Left lobe atrophy, most marked in   segment 4. Mild intrahepatic ductal dilatation involvement segments 2 and   3 with ill-defined abnormal enhancement at the junction of the medial and   lateral left lobe segments. Right hepatic and extrahepatic ducts are   normal in caliber.  GALLBLADDER: Within normal limits.  SPLEEN: Within normal limits.  PANCREAS: Within normal limits.  ADRENALS: Within normal limits.  KIDNEYS/URETERS: Bilateral renal cysts.    VISUALIZED PORTIONS:  BOWEL: Sigmoid diverticulosis.  PERITONEUM: No ascites.  VESSELS: Within normal limits.  RETROPERITONEUM/LYMPH NODES: No lymphadenopathy.  ABDOMINAL WALL: Within normal limits.  BONES: No suspicious lesion.    IMPRESSION:  Left hepatic lobe atrophy and mild intrahepatic dilatation involving   segments 2 and 3. Suspicion for stricturing mass at the junction of the   medial and lateral left lobes. 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    < end of copied text >

## 2022-01-24 NOTE — PROGRESS NOTE ADULT - ASSESSMENT
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  Blood pressure control on amlodipine  lovenox for dvt prophylaxis  Downgrade from stepdown to tele  Spoke to IR about biopsy. They prefer to wait for final pathology from brain tumor biopsy before doing a biopsy of the liver.  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  Blood pressure control on amlodipine  lovenox for dvt prophylaxis  Downgrade from stepdown to tele  Spoke to IR, Dr Toney, about biopsy. They prefer to wait for final pathology from brain tumor biopsy before doing a biopsy of the liver.

## 2022-01-24 NOTE — PROGRESS NOTE ADULT - SUBJECTIVE AND OBJECTIVE BOX
Patient seen this morning. Reports that she is doing well but remains with some impairment of orientation.    REVIEW OF SYSTEMS  + loss of strength, + memory loss    FUNCTIONAL PROGRESS  1/21 ST  Speech Language Pathology Recommendations: 1. Puree, mildly thick fluids 2. STRICT aspiration precautions 3. Oral care 4. Small bites/sips 6. 1:1 assist with PO: slow rate of intake 7. Crush meds 8. Upright with PO 9. Speech-language eval 10. Will follow, to re-assess swallow, as schedule permits     1/20 PT  Bed Mobility: Sit to Supine:     · Level of [place]	moderate assist (50% patients effort)  · Physical Assist/Nonphysical Assist	1 person assist    Bed Mobility: Supine to Sit:     · Level of [place]	moderate assist (50% patients effort)  · Physical Assist/Nonphysical Assist	1 person assist    Bed Mobility Analysis:     · Impairments Contributing to Impaired Bed Mobility	impaired postural control; safety    Transfer: Sit to Stand:     · Level of [place]	moderate assist (50% patients effort)  · Physical Assist/Nonphysical Assist	2 person assist  · Assistive Device	attempted with RW, improved with b/l HHA and right knee blocked    Transfer: Stand to Sit:     · Level of [place]	moderate assist (50% patients effort)  · Physical Assist/Nonphysical Assist	2 person assist    Sit/Stand Transfer Safety Analysis:     · Transfer Safety Concerns Noted	decreased weight-shifting ability  · Impairments Contributing to Impaired Transfers	impaired postural control; decreased strength    Gait Skills:     · Level of [place]	maximum assist (25% patients effort)  · Physical Assist/Nonphysical Assist	2 person assist  · Assistive Device	b/l HHA  · Gait Distance	2 side steps    Gait Analysis:     · Gait Deviations Noted	decreased weight-shifting ability; needs right knee blocked and assist to advance LEs    Stair Negotiation:     · Level of [place]	unable to perform    Balance Skills Assessment:     · Sitting Balance: Static	fair minus  · Sitting Balance: Dynamic	fair minus  · Sit-to-Stand Balance	poor balance  · Standing Balance: Static	poor balance  · Standing Balance: Dynamic	poor balance  · Identified Impairments Contributing to Balance Disturbance	decreased strength; impaired postural control; impaired coordination      VITALS  T(C): 36.6 (01-24-22 @ 08:00), Max: 37.1 (01-23-22 @ 19:42)  HR: 83 (01-24-22 @ 08:00) (74 - 90)  BP: 125/68 (01-24-22 @ 08:00) (125/68 - 163/85)  RR: 18 (01-24-22 @ 08:00) (12 - 25)  SpO2: 94% (01-24-22 @ 08:00) (92% - 98%)  Wt(kg): --    MEDICATIONS   acetaminophen     Tablet .. 650 milliGRAM(s) every 6 hours PRN  amLODIPine   Tablet 5 milliGRAM(s) daily  dexAMETHasone     Tablet 3 milliGRAM(s) every 6 hours  dextrose 40% Gel 15 Gram(s) once  dextrose 5%. 1000 milliLiter(s) <Continuous>  dextrose 5%. 1000 milliLiter(s) <Continuous>  dextrose 50% Injectable 25 Gram(s) once  dextrose 50% Injectable 12.5 Gram(s) once  dextrose 50% Injectable 25 Gram(s) once  doxazosin 2 milliGRAM(s) daily  enoxaparin Injectable 40 milliGRAM(s) at bedtime  glucagon  Injectable 1 milliGRAM(s) once  hydrALAZINE Injectable 10 milliGRAM(s) every 2 hours PRN  insulin lispro (ADMELOG) corrective regimen sliding scale   Before meals and at bedtime  labetalol Injectable 10 milliGRAM(s) every 2 hours PRN  OLANZapine 2.5 milliGRAM(s) every 12 hours PRN  ondansetron Injectable 4 milliGRAM(s) every 6 hours PRN  oxyCODONE    IR 5 milliGRAM(s) every 6 hours PRN  oxyCODONE    IR 10 milliGRAM(s) every 6 hours PRN  pantoprazole  Injectable 40 milliGRAM(s) daily  valproate sodium IVPB 500 milliGRAM(s) every 12 hours      RECENT LABS/IMAGING                          13.7   11.61 )-----------( 273      ( 24 Jan 2022 10:19 )             40.8     01-24    134<L>  |  102  |  19.4  ----------------------------<  239<H>  3.5   |  20.0<L>  |  0.44<L>    Ca    8.2<L>      24 Jan 2022 10:19  Phos  2.4     01-24  Mg     2.1     01-24      MRI HEAD 1/14 - 1)  in addition to the corpus callosal infiltrative lesion, there are approximately 5 other lesions with associated edema and enhancement. Vasogenic edema is most extensive in the left parietal region. See full description above. Differential considerations, therefore, include the possibility of metastatic disease versus lymphoma rather than glioblastoma. Further assessment and correlation recommended.2)  no evidence of arterial or venous occlusive disease.    MRI ABD 1/14 - Left hepatic lobe atrophy and mild intrahepatic dilatation involving segments 2 and 3. Suspicion for stricturing mass at the junction of the medial and lateral left lobes. 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    MR Brain Stereotactic w/ IV Cont 1/18: 5.2 cm TRV x 3.3 cm AP x 3.1 cm CC solid enhancing lobulated mass in the posterior body of the corpus callosum. Moderate vasogenic edema is noted. A 1 cm enhancing lesion is seen in the LEFT parietal cortex and several subcentimeter enhancing lesions are seen in the bifrontal subcortical white matter.  FINDINGS suspicious for primary CNS lymphoma.    CT Head 1/19: 3.4 x 5.1 cm acute hemorrhage along the biopsy tract in the LEFT parietal lobe extending to the mass within the posterior corpus callosum. Pneumocephalus noted along the biopsy tract as well as within the mass. Overlying RIGHT parietal LEFT parietal craniotomy is noted.    CT Head 1/19: No change since 2:30 PM. Left parietal parenchymal hemorrhage along the biopsy tract to the mass in the splenium of the corpus callosum.    CT Head 1/22: STABLE HEMORRHAGE LEFT PARIETAL LOBE ALONG A BIOPSY TRACT LEADING TO A 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    ----------------------------------------------------------------------------------------  PHYSICAL EXAM  Constitutional - NAD, Comfortable  HEENT - NCAT, EOMI  Neck - Supple, No limited ROM  Chest - Breathing comfortably, No wheezing  Cardiovascular - S1S2   Abdomen - Soft   Extremities - No C/C/E, No calf tenderness   Neurologic Exam -                    Cognitive - AAO to self, right neglect and with poor insight into deficits     Communication - Dysarthric     Cranial Nerves - left facial droop     Motor - follows commands on the left. able to move the right side (arm>leg) but with neglect and inattention component  Psychiatric - Calm  ----------------------------------------------------------------------------------------  ASSESSMENT/PLAN  81yFemale with functional deficits after 2 weeks of feeling off, now found to have a brain mass and IPH post-biopsy  Brain Mass - Depakote, Decadron  IPH - Monitoring.   Sleep - Recommend melatonin 5 mg HS  Oropharyngeal Dysphagia - Pureed diet with mildly thick liquids  HTN - Amlodipine, Hydralazine, Labetalol  Pain - Tylenol  DVT PPX - SCDs, Lovenox  Rehab - Patient currently being medically optimized. Anticipate eventual acute rehab based upon her current functional deficits.    Will continue to follow. Rehab recommendations are dependent on how functional progress changes as well as how patient continues to participate and tolerate therapeutic interventions, which may change. Recommend ongoing mobilization by staff to maintain cardiopulmonary function and prevention of secondary complications related to debility. Discussed with rehab team.            Patient seen this morning. Reports that she is doing well but remains with some impairment of orientation.    REVIEW OF SYSTEMS  + loss of strength, + memory loss    FUNCTIONAL PROGRESS  1/21 ST  Speech Language Pathology Recommendations: 1. Puree, mildly thick fluids 2. STRICT aspiration precautions 3. Oral care 4. Small bites/sips 6. 1:1 assist with PO: slow rate of intake 7. Crush meds 8. Upright with PO 9. Speech-language eval 10. Will follow, to re-assess swallow, as schedule permits     1/20 PT  Bed Mobility: Sit to Supine:     · Level of [place]	moderate assist (50% patients effort)  · Physical Assist/Nonphysical Assist	1 person assist    Bed Mobility: Supine to Sit:     · Level of [place]	moderate assist (50% patients effort)  · Physical Assist/Nonphysical Assist	1 person assist    Bed Mobility Analysis:     · Impairments Contributing to Impaired Bed Mobility	impaired postural control; safety    Transfer: Sit to Stand:     · Level of [place]	moderate assist (50% patients effort)  · Physical Assist/Nonphysical Assist	2 person assist  · Assistive Device	attempted with RW, improved with b/l HHA and right knee blocked    Transfer: Stand to Sit:     · Level of [place]	moderate assist (50% patients effort)  · Physical Assist/Nonphysical Assist	2 person assist    Sit/Stand Transfer Safety Analysis:     · Transfer Safety Concerns Noted	decreased weight-shifting ability  · Impairments Contributing to Impaired Transfers	impaired postural control; decreased strength    Gait Skills:     · Level of [place]	maximum assist (25% patients effort)  · Physical Assist/Nonphysical Assist	2 person assist  · Assistive Device	b/l HHA  · Gait Distance	2 side steps    Gait Analysis:     · Gait Deviations Noted	decreased weight-shifting ability; needs right knee blocked and assist to advance LEs    Stair Negotiation:     · Level of [place]	unable to perform    Balance Skills Assessment:     · Sitting Balance: Static	fair minus  · Sitting Balance: Dynamic	fair minus  · Sit-to-Stand Balance	poor balance  · Standing Balance: Static	poor balance  · Standing Balance: Dynamic	poor balance  · Identified Impairments Contributing to Balance Disturbance	decreased strength; impaired postural control; impaired coordination      VITALS  T(C): 36.6 (01-24-22 @ 08:00), Max: 37.1 (01-23-22 @ 19:42)  HR: 83 (01-24-22 @ 08:00) (74 - 90)  BP: 125/68 (01-24-22 @ 08:00) (125/68 - 163/85)  RR: 18 (01-24-22 @ 08:00) (12 - 25)  SpO2: 94% (01-24-22 @ 08:00) (92% - 98%)  Wt(kg): --    MEDICATIONS   acetaminophen     Tablet .. 650 milliGRAM(s) every 6 hours PRN  amLODIPine   Tablet 5 milliGRAM(s) daily  dexAMETHasone     Tablet 3 milliGRAM(s) every 6 hours  dextrose 40% Gel 15 Gram(s) once  dextrose 5%. 1000 milliLiter(s) <Continuous>  dextrose 5%. 1000 milliLiter(s) <Continuous>  dextrose 50% Injectable 25 Gram(s) once  dextrose 50% Injectable 12.5 Gram(s) once  dextrose 50% Injectable 25 Gram(s) once  doxazosin 2 milliGRAM(s) daily  enoxaparin Injectable 40 milliGRAM(s) at bedtime  glucagon  Injectable 1 milliGRAM(s) once  hydrALAZINE Injectable 10 milliGRAM(s) every 2 hours PRN  insulin lispro (ADMELOG) corrective regimen sliding scale   Before meals and at bedtime  labetalol Injectable 10 milliGRAM(s) every 2 hours PRN  OLANZapine 2.5 milliGRAM(s) every 12 hours PRN  ondansetron Injectable 4 milliGRAM(s) every 6 hours PRN  oxyCODONE    IR 5 milliGRAM(s) every 6 hours PRN  oxyCODONE    IR 10 milliGRAM(s) every 6 hours PRN  pantoprazole  Injectable 40 milliGRAM(s) daily  valproate sodium IVPB 500 milliGRAM(s) every 12 hours      RECENT LABS/IMAGING                          13.7   11.61 )-----------( 273      ( 24 Jan 2022 10:19 )             40.8     01-24    134<L>  |  102  |  19.4  ----------------------------<  239<H>  3.5   |  20.0<L>  |  0.44<L>    Ca    8.2<L>      24 Jan 2022 10:19  Phos  2.4     01-24  Mg     2.1     01-24      MRI HEAD 1/14 - 1)  in addition to the corpus callosal infiltrative lesion, there are approximately 5 other lesions with associated edema and enhancement. Vasogenic edema is most extensive in the left parietal region. See full description above. Differential considerations, therefore, include the possibility of metastatic disease versus lymphoma rather than glioblastoma. Further assessment and correlation recommended.2)  no evidence of arterial or venous occlusive disease.    MRI ABD 1/14 - Left hepatic lobe atrophy and mild intrahepatic dilatation involving segments 2 and 3. Suspicion for stricturing mass at the junction of the medial and lateral left lobes. 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    MR Brain Stereotactic w/ IV Cont 1/18: 5.2 cm TRV x 3.3 cm AP x 3.1 cm CC solid enhancing lobulated mass in the posterior body of the corpus callosum. Moderate vasogenic edema is noted. A 1 cm enhancing lesion is seen in the LEFT parietal cortex and several subcentimeter enhancing lesions are seen in the bifrontal subcortical white matter.  FINDINGS suspicious for primary CNS lymphoma.    CT Head 1/19: 3.4 x 5.1 cm acute hemorrhage along the biopsy tract in the LEFT parietal lobe extending to the mass within the posterior corpus callosum. Pneumocephalus noted along the biopsy tract as well as within the mass. Overlying RIGHT parietal LEFT parietal craniotomy is noted.    CT Head 1/19: No change since 2:30 PM. Left parietal parenchymal hemorrhage along the biopsy tract to the mass in the splenium of the corpus callosum.    CT Head 1/22: STABLE HEMORRHAGE LEFT PARIETAL LOBE ALONG A BIOPSY TRACT LEADING TO A 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    ----------------------------------------------------------------------------------------  PHYSICAL EXAM  Constitutional - NAD, Comfortable  HEENT - NCAT, EOMI  Neck - Supple, No limited ROM  Chest - Breathing comfortably, No wheezing  Cardiovascular - S1S2   Abdomen - Soft   Extremities - No C/C/E, No calf tenderness   Neurologic Exam -                    Cognitive - AAO to self, right neglect and with poor insight into deficits     Communication - Dysarthric     Cranial Nerves - left facial droop     Motor - follows commands on the left. able to move the right side (arm>leg) but with neglect and inattention component  Psychiatric - Calm  ----------------------------------------------------------------------------------------  ASSESSMENT/PLAN  81yFemale with functional deficits after 2 weeks of feeling off, now found to have a brain mass and IPH post-biopsy  Brain Mass - Depakote, Decadron  IPH - Monitoring.   Sleep - Melatonin 5mg HS (1/24)  Oropharyngeal Dysphagia - Pureed diet with mildly thick liquids  HTN - Amlodipine, Hydralazine, Labetalol  Pain - Tylenol  DVT PPX - SCDs, Lovenox  Rehab - Patient currently being medically optimized. Anticipate eventual acute rehab based upon her current functional deficits.    Will continue to follow. Rehab recommendations are dependent on how functional progress changes as well as how patient continues to participate and tolerate therapeutic interventions, which may change. Recommend ongoing mobilization by staff to maintain cardiopulmonary function and prevention of secondary complications related to debility. Discussed with rehab team.

## 2022-01-25 NOTE — PROGRESS NOTE ADULT - SUBJECTIVE AND OBJECTIVE BOX
Chief Complaint:  Patient is a 81y old  Female who presents with a chief complaint of Brain mass. Being seen in follow-up consultation for radiology finding of liver lesion.       Interval Events / Subjective: 81y Female PMH HTN[place] [facility] [place] also attests to feeling off for 1 month, now found with corpus callosal lesion, now s/p craniotomy for biopsy POD#4, postoperative course complicated with hemorrhage in surgical bed/biopsy tract. Patient seen and evaluated at bedside, reporting no complaints, no overnight events. Patient Mental status improved. Denies nausea, vomiting, abdominal pain, chest pain, shortness of breath, hematemesis, hematochezia, melena.      REVIEW OF SYSTEMS:   General: Negative  HEENT: Negative  CV: Negative  Respiratory: Negative  GI: See HPI  : Negative  MSK: Negative  Hematologic: Negative  Skin: Negative    MEDICATIONS:   MEDICATIONS  (STANDING):  amLODIPine   Tablet 5 milliGRAM(s) Oral daily  dexAMETHasone     Tablet 3 milliGRAM(s) Oral every 6 hours  dextrose 40% Gel 15 Gram(s) Oral once  dextrose 5%. 1000 milliLiter(s) (50 mL/Hr) IV Continuous <Continuous>  dextrose 5%. 1000 milliLiter(s) (100 mL/Hr) IV Continuous <Continuous>  dextrose 50% Injectable 25 Gram(s) IV Push once  dextrose 50% Injectable 12.5 Gram(s) IV Push once  dextrose 50% Injectable 25 Gram(s) IV Push once  doxazosin 2 milliGRAM(s) Oral daily  enoxaparin Injectable 40 milliGRAM(s) SubCutaneous at bedtime  glucagon  Injectable 1 milliGRAM(s) IntraMuscular once  insulin lispro (ADMELOG) corrective regimen sliding scale   SubCutaneous Before meals and at bedtime  melatonin 5 milliGRAM(s) Oral at bedtime  pantoprazole  Injectable 40 milliGRAM(s) IV Push daily  valproate sodium IVPB 500 milliGRAM(s) IV Intermittent every 12 hours    MEDICATIONS  (PRN):  acetaminophen     Tablet .. 650 milliGRAM(s) Oral every 6 hours PRN Mild Pain (1 - 3)  hydrALAZINE Injectable 10 milliGRAM(s) IV Push every 2 hours PRN SBP > 140  labetalol Injectable 10 milliGRAM(s) IV Push every 2 hours PRN SBP > 140  OLANZapine 2.5 milliGRAM(s) Oral every 12 hours PRN agitation  ondansetron Injectable 4 milliGRAM(s) IV Push every 6 hours PRN Nausea and/or Vomiting  oxyCODONE    IR 5 milliGRAM(s) Oral every 6 hours PRN Moderate Pain (4 - 6)  oxyCODONE    IR 10 milliGRAM(s) Oral every 6 hours PRN Severe Pain (7 - 10)      ALLERGIES:   Allergies    Bananas (Angioedema; Anaphylaxis; Short breath)  Kiwi (Angioedema; Anaphylaxis; Short breath)  latex (Unknown)  [place] (Angioedema; Anaphylaxis; Short breath)  No Known Drug Allergies  strawberry (Angioedema; Anaphylaxis; Short breath)    Intolerances        VITAL SIGNS:   Vital Signs Last 24 Hrs  T(C): 36.9 (25 Jan 2022 10:30), Max: 36.9 (25 Jan 2022 10:30)  T(F): 98.4 (25 Jan 2022 10:30), Max: 98.4 (25 Jan 2022 10:30)  HR: 106 (25 Jan 2022 10:30) (59 - 106)  BP: 121/69 (25 Jan 2022 10:30) (121/69 - 158/82)  BP(mean): 97 (24 Jan 2022 20:00) (97 - 97)  RR: 18 (25 Jan 2022 10:30) (17 - 20)  SpO2: 97% (25 Jan 2022 10:30) (97% - 99%)  I&O's Summary    24 Jan 2022 07:01  -  25 Jan 2022 07:00  --------------------------------------------------------  IN: 100 mL / OUT: 850 mL / NET: -750 mL    25 Jan 2022 07:01  -  25 Jan 2022 12:32  --------------------------------------------------------  IN: 180 mL / OUT: 800 mL / NET: -620 mL        PHYSICAL EXAM:   GENERAL:  No acute distress  HEENT:  NC/AT,  conjunctivae clear, sclera -anicteric  CHEST:  Full & symmetric excursion, no increased effort, breath sounds clear  HEART:  Regular rhythm, S1, S2, no murmur/rub/S3/S4,  no edema  ABDOMEN:  Soft, non-tender, non-distended, normoactive bowel sounds.   EXTREMITIES: No cyanosis, clubbing or edema  SKIN:  No rash/erythema/ecchymoses/petechiae/wounds/abscess/warm/dry  NEURO:  Alert, oriented    LABS:  CBC Full  -  ( 24 Jan 2022 10:19 )  WBC Count : 11.61 K/uL  RBC Count : 4.39 M/uL  Hemoglobin : 13.7 g/dL  Hematocrit : 40.8 %  Platelet Count - Automated : 273 K/uL  Mean Cell Volume : 92.9 fl  Mean Cell Hemoglobin : 31.2 pg  Mean Cell Hemoglobin Concentration : 33.6 gm/dL  Auto Neutrophil # : x  Auto Lymphocyte # : x  Auto Monocyte # : x  Auto Eosinophil # : x  Auto Basophil # : x  Auto Neutrophil % : x  Auto Lymphocyte % : x  Auto Monocyte % : x  Auto Eosinophil % : x  Auto Basophil % : x    01-24    134<L>  |  102  |  19.4  ----------------------------<  239<H>  3.5   |  20.0<L>  |  0.44<L>    Ca    8.2<L>      24 Jan 2022 10:19  Phos  2.4     01-24  Mg     2.1     01-24                RADIOLOGY & ADDITIONAL STUDIES (The following images were personally reviewed):    < from: MR Abdomen w/wo IV Cont (01.14.22 @ 19:55) >  FINDINGS:  LOWER CHEST: Within normal limits.    LIVER/BILE DUCTS: Numerous liver cysts. Left lobe atrophy, most marked in   segment 4. Mild intrahepatic ductal dilatation involvement segments 2 and   3 with ill-defined abnormal enhancement at the junction of the medial and   lateral left lobe segments. Right hepatic and extrahepatic ducts are   normal in caliber.  GALLBLADDER: Within normal limits.  SPLEEN: Within normal limits.  PANCREAS: Within normal limits.  ADRENALS: Within normal limits.  KIDNEYS/URETERS: Bilateral renal cysts.    VISUALIZED PORTIONS:  BOWEL: Sigmoid diverticulosis.  PERITONEUM: No ascites.  VESSELS: Within normal limits.  RETROPERITONEUM/LYMPH NODES: No lymphadenopathy.  ABDOMINAL WALL: Within normal limits.  BONES: No suspicious lesion.    IMPRESSION:  Left hepatic lobe atrophy and mild intrahepatic dilatation involving   segments 2 and 3. Suspicion for stricturing mass at the junction of the   medial and lateral left lobes. 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    < end of copied text >

## 2022-01-25 NOTE — PROGRESS NOTE ADULT - ASSESSMENT
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  Blood pressure control on amlodipine  Lovenox for DVT prophylaxis  Tele  SLP re-evaluation   Pending final pathology report of brain biopsy, oncology to f/u following report

## 2022-01-25 NOTE — PROGRESS NOTE ADULT - PROBLEM SELECTOR PLAN 1
MRI abdomen reported as left hepatic lobe atrophy and mild intrahepatic dilatation, suspicion for stricturing mass at the junction of the medial and lateral left lobes.     - AFP and CEA negative   - LFTs and INR normal. No cirrhosis noted   - As per IR, Dr Toney, will wait for final pathology from brain tumor biopsy before proceeding with biopsy of the liver.   - Continue PPI BID for cytoprotection. MRI abdomen reported as left hepatic lobe atrophy and mild intrahepatic dilatation, suspicion for stricturing mass at the junction of the medial and lateral left lobes.     - AFP and CEA negative   - LFTs and INR normal. No cirrhosis noted   - As per IR, Dr Toney, will wait for final pathology from brain tumor biopsy before proceeding with biopsy of the liver.

## 2022-01-25 NOTE — PROGRESS NOTE ADULT - SUBJECTIVE AND OBJECTIVE BOX
INTERVAL HPI/OVERNIGHT EVENTS:  Pt admitted on 1/13 for generalize malaise with diff with ambulation, near syncope which lead to the pt striking head. Neg LOC  POD # 7 for a left craniotomy for brain tumor - post op the patient was noted for rt sided weakness and aphasic.   Patient today pending final pathology / oncology f/u for brain tumor biopsy.     MEDICATIONS  (STANDING):  amLODIPine   Tablet 5 milliGRAM(s) Oral daily  dexAMETHasone  Injectable 4 milliGRAM(s) IV Push every 6 hours  dextrose 40% Gel 15 Gram(s) Oral once  dextrose 5%. 1000 milliLiter(s) (50 mL/Hr) IV Continuous <Continuous>  dextrose 5%. 1000 milliLiter(s) (100 mL/Hr) IV Continuous <Continuous>  dextrose 50% Injectable 25 Gram(s) IV Push once  dextrose 50% Injectable 12.5 Gram(s) IV Push once  dextrose 50% Injectable 25 Gram(s) IV Push once  doxazosin 2 milliGRAM(s) Oral daily  enoxaparin Injectable 40 milliGRAM(s) SubCutaneous at bedtime  glucagon  Injectable 1 milliGRAM(s) IntraMuscular once  insulin lispro (ADMELOG) corrective regimen sliding scale   SubCutaneous Before meals and at bedtime  pantoprazole  Injectable 40 milliGRAM(s) IV Push daily  valproate sodium IVPB 500 milliGRAM(s) IV Intermittent every 12 hours    MEDICATIONS  (PRN):  acetaminophen     Tablet .. 650 milliGRAM(s) Oral every 6 hours PRN Mild Pain (1 - 3)  hydrALAZINE Injectable 10 milliGRAM(s) IV Push every 2 hours PRN SBP > 140  labetalol Injectable 10 milliGRAM(s) IV Push every 2 hours PRN SBP > 140  OLANZapine 2.5 milliGRAM(s) Oral every 12 hours PRN agitation  ondansetron Injectable 4 milliGRAM(s) IV Push every 6 hours PRN Nausea and/or Vomiting  oxyCODONE    IR 5 milliGRAM(s) Oral every 6 hours PRN Moderate Pain (4 - 6)  oxyCODONE    IR 10 milliGRAM(s) Oral every 6 hours PRN Severe Pain (7 - 10)      Allergies  Bananas (Angioedema; Anaphylaxis; Short breath)  Kiwi (Angioedema; Anaphylaxis; Short breath)  latex (Unknown)  Rock (Angioedema; Anaphylaxis; Short breath)  No Known Drug Allergies  strawberry (Angioedema; Anaphylaxis; Short breath)    Vital Signs Last 24 Hrs  T(C): 36.6 (24 Jan 2022 08:00), Max: 37.1 (23 Jan 2022 19:42)  T(F): 97.8 (24 Jan 2022 08:00), Max: 98.8 (23 Jan 2022 19:42)  HR: 83 (24 Jan 2022 08:00) (65 - 90)  BP: 125/68 (24 Jan 2022 08:00) (125/68 - 163/85)  BP(mean): 87 (24 Jan 2022 08:00) (75 - 117)  RR: 18 (24 Jan 2022 08:00) (12 - 25)  SpO2: 94% (24 Jan 2022 08:00) (92% - 98%) BMI (kg/m2): 23 (01-19-22 @ 12:00)    PHYSICAL EXAM  GENERAL: NAD,   HEAD:  Atraumatic, incision clean and dry intact neg erythema,   EYES: EOMI, PERRLA, conjunctiva and sclera clear  ENMT: No tonsillar erythema, exudates, or enlargement; Moist mucous membranes, Good dentition, No lesions  NECK: Supple,  NERVOUS SYSTEM:  Alert & Oriented X3, Good concentration; Motor Strength 5/5 B/L upper and lower extremities; DTRs 2+ intact and symmetric  EXTREMITIES:  2+ Peripheral Pulses, No edema  LYMPH: No lymphadenopathy noted  SKIN: No rashes or lesions    LABS:                     13.7   11.61 )-----------( 273      ( 24 Jan 2022 10:19 )             40.8   01-23  138  |  106  |  16.1  ----------------------------<  109<H>  4.3   |  18.0<L>  |  0.22<L>  Ca    8.0<L>      23 Jan 2022 03:53  Phos  2.5     01-23  Mg     2.1     01-23    I&O's Detail  23 Jan 2022 07:01  -  24 Jan 2022 07:00  --------------------------------------------------------  IN:    IV PiggyBack: 100 mL    Oral Fluid: 680 mL  Total IN: 780 mL  OUT:    Indwelling Catheter - Urethral (mL): 2420 mL  Total OUT: 2420 mL  Total NET: -1640 mL    RADIOLOGY & ADDITIONAL TESTS:  < from: CT Head No Cont (01.22.22 @ 11:56) >  ACC: [id number] EXAM:  CT BRAIN                        PROCEDURE DATE:  01/22/2022    IMPRESSION:  STABLE HEMORRHAGE LEFT PARIETAL LOBE ALONG A BIOPSY TRACT LEADING TO A   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  --- End of Report ---  < end of copied text >

## 2022-01-26 NOTE — PROGRESS NOTE ADULT - PROBLEM SELECTOR PLAN 1
MRI abdomen reported as left hepatic lobe atrophy and mild intrahepatic dilatation, suspicion for stricturing mass at the junction of the medial and lateral left lobes.     - AFP and CEA negative   - LFTs and INR normal. No cirrhosis noted   - As per IR, Dr Toney, will wait for final pathology from brain tumor biopsy before proceeding with biopsy of the liver.  Will continue to follow

## 2022-01-26 NOTE — PROGRESS NOTE ADULT - SUBJECTIVE AND OBJECTIVE BOX
Chief Complaint:  Patient is a 81y old  Female who presents with a chief complaint of Brain mass. Being seen in follow-up consultation for radiology finding of liver lesion.     Interval Events / Subjective: 81y Female PMH HTN, [place] [facility] [place], also attests to feeling off for 1 month, now found with corpus callosal lesion, now s/p craniotomy for biopsy (01/19/22). Awaiting pathology report. Post- operative course complicated with hemorrhage in surgical bed/biopsy tract. Patient seen and evaluated at bedside, reporting no complaints, no overnight events. Denies nausea, vomiting, abdominal pain, chest pain, shortness of breath, hematemesis, hematochezia, melena.    REVIEW OF SYSTEMS:   General: Negative  HEENT: Negative  CV: Negative  Respiratory: Negative  GI: See HPI  : Negative  MSK: Negative  Hematologic: Negative  Skin: Negative    PAST MEDICAL/SURGICAL HISTORY:  HTN (hypertension)    No significant past surgical history      MEDICATIONS  (STANDING):  amLODIPine   Tablet 5 milliGRAM(s) Oral daily  dexAMETHasone     Tablet 3 milliGRAM(s) Oral every 6 hours  dextrose 40% Gel 15 Gram(s) Oral once  dextrose 5%. 1000 milliLiter(s) (50 mL/Hr) IV Continuous <Continuous>  dextrose 5%. 1000 milliLiter(s) (100 mL/Hr) IV Continuous <Continuous>  dextrose 50% Injectable 25 Gram(s) IV Push once  dextrose 50% Injectable 12.5 Gram(s) IV Push once  dextrose 50% Injectable 25 Gram(s) IV Push once  doxazosin 2 milliGRAM(s) Oral daily  enoxaparin Injectable 40 milliGRAM(s) SubCutaneous at bedtime  glucagon  Injectable 1 milliGRAM(s) IntraMuscular once  insulin lispro (ADMELOG) corrective regimen sliding scale   SubCutaneous Before meals and at bedtime  melatonin 5 milliGRAM(s) Oral at bedtime  pantoprazole  Injectable 40 milliGRAM(s) IV Push daily  valproate sodium IVPB 500 milliGRAM(s) IV Intermittent every 12 hours    MEDICATIONS  (PRN):  acetaminophen     Tablet .. 650 milliGRAM(s) Oral every 6 hours PRN Mild Pain (1 - 3)  hydrALAZINE Injectable 10 milliGRAM(s) IV Push every 2 hours PRN SBP > 140  labetalol Injectable 10 milliGRAM(s) IV Push every 2 hours PRN SBP > 140  OLANZapine 2.5 milliGRAM(s) Oral every 12 hours PRN agitation  ondansetron Injectable 4 milliGRAM(s) IV Push every 6 hours PRN Nausea and/or Vomiting  oxyCODONE    IR 5 milliGRAM(s) Oral every 6 hours PRN Moderate Pain (4 - 6)  oxyCODONE    IR 10 milliGRAM(s) Oral every 6 hours PRN Severe Pain (7 - 10)    Bananas (Angioedema; Anaphylaxis; Short breath)  Kiwi (Angioedema; Anaphylaxis; Short breath)  latex (Unknown)  [place] (Angioedema; Anaphylaxis; Short breath)  No Known Drug Allergies  strawberry (Angioedema; Anaphylaxis; Short breath)    T(C): 36.7 (01-26-22 @ 08:48), Max: 36.9 (01-25-22 @ 10:30)  HR: 96 (01-26-22 @ 08:48) (79 - 106)  BP: 122/65 (01-26-22 @ 08:48) (121/69 - 156/84)  RR: 18 (01-26-22 @ 08:48) (18 - 19)  SpO2: 100% (01-26-22 @ 08:48) (95% - 100%)    I&O's Summary    25 Jan 2022 07:01  -  26 Jan 2022 07:00  --------------------------------------------------------  IN: 540 mL / OUT: 1150 mL / NET: -610 mL      PHYSICAL EXAM:  Constitutional: No acute distress  Neuro: Awake alert, oriented to person, place and situation  HEENT: PERRL, anicteric sclerae  Neck: supple, no JVD  CV: +S1S2,   Pulm/chest: lung sounds CTA and equal bilaterally, no accessory muscle use noted  Abd: soft, NT, ND, +BS  Ext: no Cyanosis, clubbing or edema  Skin: warm, well perfused, no jaundice   Psych: calm, appropriate affect      LABS:               14.0   12.77 )-----------( 294      ( 01-26 @ 07:07 )             40.0                13.7   11.61 )-----------( 273      ( 01-24 @ 10:19 )             40.8       01-26    132<L>  |  99  |  16.3  ----------------------------<  117<H>  3.8   |  21.0<L>  |  0.30<L>    Ca    8.4<L>      26 Jan 2022 07:07  Phos  3.3     01-26  Mg     2.0     01-26    TPro  5.0<L>  /  Alb  3.5  /  TBili  0.9  /  DBili  x   /  AST  13  /  ALT  26  /  AlkPhos  70  01-26    LIVER FUNCTIONS - ( 26 Jan 2022 07:07 )  Alb: 3.5 g/dL / Pro: 5.0 g/dL / ALK PHOS: 70 U/L / ALT: 26 U/L / AST: 13 U/L / GGT: x             < from: CT Head No Cont (01.22.22 @ 11:56) >  FINDINGS:  Hemorrhage is again noted to be status post a left occipital parietal   craniotomy. There is a tumor centered on the splenium the corpus   callosum. Hemorrhage is present along a biopsy tract and within the   callosal mass is unchanged. Stable mild intraventricular hemorrhage.    Edema within the left greater than right frontal parietal regions appears   stable. Associated mass effect is stable with effacement of the left   greater than right lateral ventricles.    IMPRESSION:  STABLE HEMORRHAGE LEFT PARIETAL LOBE ALONG A BIOPSY TRACT LEADING TO A   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    < end of copied text >

## 2022-01-26 NOTE — PROGRESS NOTE ADULT - SUBJECTIVE AND OBJECTIVE BOX
INTERVAL HPI/OVERNIGHT EVENTS:  Pt admitted on 1/13 for generalize malaise with diff with ambulation, near syncope which lead to the pt striking head. Neg LOC  POD # 8       f or a left craniotomy for brain tumor - post op the patient was noted for rt sided weakness and aphasic.   Patient today   seen  bright aware  of  surroundings.    MEDICATIONS  (STANDING):  amLODIPine   Tablet 5 milliGRAM(s) Oral daily  dexAMETHasone     Tablet 3 milliGRAM(s) Oral every 6 hours  dextrose 40% Gel 15 Gram(s) Oral once  dextrose 5%. 1000 milliLiter(s) (50 mL/Hr) IV Continuous <Continuous>  dextrose 5%. 1000 milliLiter(s) (100 mL/Hr) IV Continuous <Continuous>  dextrose 50% Injectable 25 Gram(s) IV Push once  dextrose 50% Injectable 12.5 Gram(s) IV Push once  dextrose 50% Injectable 25 Gram(s) IV Push once  doxazosin 2 milliGRAM(s) Oral daily  enoxaparin Injectable 40 milliGRAM(s) SubCutaneous at bedtime  glucagon  Injectable 1 milliGRAM(s) IntraMuscular once  insulin lispro (ADMELOG) corrective regimen sliding scale   SubCutaneous Before meals and at bedtime  melatonin 5 milliGRAM(s) Oral at bedtime  pantoprazole  Injectable 40 milliGRAM(s) IV Push daily  valproate sodium IVPB 500 milliGRAM(s) IV Intermittent every 12 hours    MEDICATIONS  (PRN):  acetaminophen     Tablet .. 650 milliGRAM(s) Oral every 6 hours PRN Mild Pain (1 - 3)  hydrALAZINE Injectable 10 milliGRAM(s) IV Push every 2 hours PRN SBP > 140  labetalol Injectable 10 milliGRAM(s) IV Push every 2 hours PRN SBP > 140  OLANZapine 2.5 milliGRAM(s) Oral every 12 hours PRN agitation  ondansetron Injectable 4 milliGRAM(s) IV Push every 6 hours PRN Nausea and/or Vomiting  oxyCODONE    IR 5 milliGRAM(s) Oral every 6 hours PRN Moderate Pain (4 - 6)  oxyCODONE    IR 10 milliGRAM(s) Oral every 6 hours PRN Severe Pain (7 - 10)      Allergies    Bananas (Angioedema; Anaphylaxis; Short breath)  Kiwi (Angioedema; Anaphylaxis; Short breath)  latex (Unknown)  [place] (Angioedema; Anaphylaxis; Short breath)  No Known Drug Allergies  strawberry (Angioedema; Anaphylaxis; Short breath)    Intolerances          Vital Signs Last 24 Hrs  T(C): 36.7 (26 Jan 2022 13:12), Max: 36.9 (25 Jan 2022 21:00)  T(F): 98 (26 Jan 2022 13:12), Max: 98.4 (25 Jan 2022 21:00)  HR: 95 (26 Jan 2022 13:12) (79 - 96)  BP: 130/62 (26 Jan 2022 13:12) (122/65 - 156/84)  BP(mean): --  RR: 17 (26 Jan 2022 13:12) (17 - 19)  SpO2: 98% (26 Jan 2022 13:12) (95% - 100%) BMI (kg/m2): 23 (01-19-22 @ 12:00)      PHYSICAL EXAM  GENERAL: NAD, well-groomed, well-developed  HEAD:  Atraumatic, Normocephalic  EYES: EOMI, PERRLA, conjunctiva and sclera clear  ENMT: No tonsillar erythema, exudates, or enlargement; Moist mucous membranes, Good dentition, No lesions  NECK: Supple, No JVD, Normal thyroid  NERVOUS SYSTEM:  Alert & Oriented X3, Good concentration; Motor Strength 5/5 B/L upper and lower extremities; DTRs 2+ intact and symmetric  CHEST/LUNG: Clear to percussion bilaterally; No rales, rhonchi, wheezing, or rubs  HEART: Regular rate and rhythm; No murmurs, rubs, or gallops  ABDOMEN: Soft, Nontender, Nondistended; Bowel sounds present  EXTREMITIES:  2+ Peripheral Pulses, No clubbing, cyanosis, or edema  LYMPH: No lymphadenopathy noted  SKIN: No rashes or lesions      LABS:                          14.0   12.77 )-----------( 294      ( 26 Jan 2022 07:07 )             40.0     01-26    132<L>  |  99  |  16.3  ----------------------------<  117<H>  3.8   |  21.0<L>  |  0.30<L>    Ca    8.4<L>      26 Jan 2022 07:07  Phos  3.3     01-26  Mg     2.0     01-26    TPro  5.0<L>  /  Alb  3.5  /  TBili  0.9  /  DBili  x   /  AST  13  /  ALT  26  /  AlkPhos  70  01-26        I&O's Detail    25 Jan 2022 07:01  -  26 Jan 2022 07:00  --------------------------------------------------------  IN:    Oral Fluid: 540 mL  Total IN: 540 mL    OUT:    Indwelling Catheter - Urethral (mL): 1150 mL  Total OUT: 1150 mL    Total NET: -610 mL      26 Jan 2022 07:01  -  26 Jan 2022 15:45  --------------------------------------------------------  IN:  Total IN: 0 mL    OUT:    Indwelling Catheter - Urethral (mL): 600 mL  Total OUT: 600 mL    Total NET: -600 mL        RADIOLOGY & ADDITIONAL TESTS:   INTERVAL HPI/OVERNIGHT EVENTS:  Pt admitted on 1/13 for generalize malaise with diff with ambulation, near syncope which lead to the pt striking head. Neg LOC  POD # 8 for a left craniotomy for brain tumor - post op the patient was noted for rt sided weakness and aphasic.   Patient today seen bright aware of  surroundings. States she feels well oriented self, place, time march,     MEDICATIONS  (STANDING):  amLODIPine   Tablet 5 milliGRAM(s) Oral daily  dexAMETHasone     Tablet 3 milliGRAM(s) Oral every 6 hours  dextrose 40% Gel 15 Gram(s) Oral once  dextrose 5%. 1000 milliLiter(s) (50 mL/Hr) IV Continuous <Continuous>  dextrose 5%. 1000 milliLiter(s) (100 mL/Hr) IV Continuous <Continuous>  dextrose 50% Injectable 25 Gram(s) IV Push once  dextrose 50% Injectable 12.5 Gram(s) IV Push once  dextrose 50% Injectable 25 Gram(s) IV Push once  doxazosin 2 milliGRAM(s) Oral daily  enoxaparin Injectable 40 milliGRAM(s) SubCutaneous at bedtime  glucagon  Injectable 1 milliGRAM(s) IntraMuscular once  insulin lispro (ADMELOG) corrective regimen sliding scale   SubCutaneous Before meals and at bedtime  melatonin 5 milliGRAM(s) Oral at bedtime  pantoprazole  Injectable 40 milliGRAM(s) IV Push daily  valproate sodium IVPB 500 milliGRAM(s) IV Intermittent every 12 hours    MEDICATIONS  (PRN):  acetaminophen     Tablet .. 650 milliGRAM(s) Oral every 6 hours PRN Mild Pain (1 - 3)  hydrALAZINE Injectable 10 milliGRAM(s) IV Push every 2 hours PRN SBP > 140  labetalol Injectable 10 milliGRAM(s) IV Push every 2 hours PRN SBP > 140  OLANZapine 2.5 milliGRAM(s) Oral every 12 hours PRN agitation  ondansetron Injectable 4 milliGRAM(s) IV Push every 6 hours PRN Nausea and/or Vomiting  oxyCODONE    IR 5 milliGRAM(s) Oral every 6 hours PRN Moderate Pain (4 - 6)  oxyCODONE    IR 10 milliGRAM(s) Oral every 6 hours PRN Severe Pain (7 - 10)      Allergies    Bananas (Angioedema; Anaphylaxis; Short breath)  Kiwi (Angioedema; Anaphylaxis; Short breath)  latex (Unknown)  [place] (Angioedema; Anaphylaxis; Short breath)  No Known Drug Allergies  strawberry (Angioedema; Anaphylaxis; Short breath)    Intolerances          Vital Signs Last 24 Hrs  T(C): 36.7 (26 Jan 2022 13:12), Max: 36.9 (25 Jan 2022 21:00)  T(F): 98 (26 Jan 2022 13:12), Max: 98.4 (25 Jan 2022 21:00)  HR: 95 (26 Jan 2022 13:12) (79 - 96)  BP: 130/62 (26 Jan 2022 13:12) (122/65 - 156/84)  BP(mean): --  RR: 17 (26 Jan 2022 13:12) (17 - 19)  SpO2: 98% (26 Jan 2022 13:12) (95% - 100%) BMI (kg/m2): 23 (01-19-22 @ 12:00)      PHYSICAL EXAM  GENERAL: NAD,   HEAD:  Atraumatic, Normocephalic  EYES: EOMI, PERRLA, conjunctiva and sclera clear  ENMT: No tonsillar erythema, exudates, or enlargement; Moist mucous membranes, Good dentition, No lesions  NECK: Supple,  NERVOUS SYSTEM:  Alert & Oriented X2, Motor Strength 5/5 B/L upper and lower extremities; DTRs 2+ intact and symmetric  CHEST/LUNG: Clear bilaterally;   HEART: Regular rate and rhythm; No murmurs, rubs, or gallops  ABDOMEN: Soft, Nontender, Nondistended; Bowel sounds present  EXTREMITIES:  2+ Peripheral Pulses, No edema  Wound clean and dry intact      LABS:                          14.0   12.77 )-----------( 294      ( 26 Jan 2022 07:07 )             40.0     01-26    132<L>  |  99  |  16.3  ----------------------------<  117<H>  3.8   |  21.0<L>  |  0.30<L>    Ca    8.4<L>      26 Jan 2022 07:07  Phos  3.3     01-26  Mg     2.0     01-26    TPro  5.0<L>  /  Alb  3.5  /  TBili  0.9  /  DBili  x   /  AST  13  /  ALT  26  /  AlkPhos  70  01-26        I&O's Detail    25 Jan 2022 07:01  -  26 Jan 2022 07:00  --------------------------------------------------------  IN:    Oral Fluid: 540 mL  Total IN: 540 mL    OUT:    Indwelling Catheter - Urethral (mL): 1150 mL  Total OUT: 1150 mL    Total NET: -610 mL      26 Jan 2022 07:01  -  26 Jan 2022 15:45  --------------------------------------------------------  IN:  Total IN: 0 mL    OUT:    Indwelling Catheter - Urethral (mL): 600 mL  Total OUT: 600 mL    Total NET: -600 mL        RADIOLOGY & ADDITIONAL TESTS:  < from: CT Head No Cont (01.22.22 @ 11:56) >  ACC: [id number] EXAM:  CT BRAIN                        PROCEDURE DATE:  01/22/2022    IMPRESSION:  STABLE HEMORRHAGE LEFT PARIETAL LOBE ALONG A BIOPSY TRACT LEADING TO A   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  --- End of Report ---   end of copied text >

## 2022-01-26 NOTE — PROGRESS NOTE ADULT - ASSESSMENT
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   valproate acid changed from iv to po 250 mg q12

## 2022-01-26 NOTE — PROGRESS NOTE ADULT - SUBJECTIVE AND OBJECTIVE BOX
Patient feels well.  Wants to get to rehab.  Reports no pain.     REVIEW OF SYSTEMS  Constitutional - No fever,  +fatigue  HEENT - No vertigo, No neck pain  Neurological - No headaches, +memory loss, No loss of strength, No numbness, No tremors  Musculoskeletal - No joint pain, No joint swelling, No muscle pain  Psychiatric - No depression, No anxiety    FUNCTIONAL PROGRESS  1/21   Speech Language Pathology Recommendations: 1. Puree, mildly thick fluids 2. STRICT aspiration precautions 3. Oral care 4. Small bites/sips 6. 1:1 assist with PO: slow rate of intake 7. Crush meds 8. Upright with PO 9. Speech-language eval 10. Will follow, to re-assess swallow, as schedule permits     1/20 PT  Bed Mobility: Sit to Supine:     · Level of [place]	moderate assist (50% patients effort)  · Physical Assist/Nonphysical Assist	1 person assist    Bed Mobility: Supine to Sit:     · Level of [place]	moderate assist (50% patients effort)  · Physical Assist/Nonphysical Assist	1 person assist    Bed Mobility Analysis:     · Impairments Contributing to Impaired Bed Mobility	impaired postural control; safety    Transfer: Sit to Stand:     · Level of [place]	moderate assist (50% patients effort)  · Physical Assist/Nonphysical Assist	2 person assist  · Assistive Device	attempted with RW, improved with b/l HHA and right knee blocked    Transfer: Stand to Sit:     · Level of [place]	moderate assist (50% patients effort)  · Physical Assist/Nonphysical Assist	2 person assist    Sit/Stand Transfer Safety Analysis:     · Transfer Safety Concerns Noted	decreased weight-shifting ability  · Impairments Contributing to Impaired Transfers	impaired postural control; decreased strength    Gait Skills:     · Level of [place]	maximum assist (25% patients effort)  · Physical Assist/Nonphysical Assist	2 person assist  · Assistive Device	b/l HHA  · Gait Distance	2 side steps    Gait Analysis:     · Gait Deviations Noted	decreased weight-shifting ability; needs right knee blocked and assist to advance LEs    Stair Negotiation:     · Level of [place]	unable to perform        VITALS  T(C): 36.7 (01-26-22 @ 08:48), Max: 36.9 (01-25-22 @ 21:00)  HR: 96 (01-26-22 @ 08:48) (79 - 96)  BP: 122/65 (01-26-22 @ 08:48) (122/65 - 156/84)  RR: 18 (01-26-22 @ 08:48) (18 - 19)  SpO2: 100% (01-26-22 @ 08:48) (95% - 100%)  Wt(kg): --    MEDICATIONS   acetaminophen     Tablet .. 650 milliGRAM(s) every 6 hours PRN  amLODIPine   Tablet 5 milliGRAM(s) daily  dexAMETHasone     Tablet 3 milliGRAM(s) every 6 hours  dextrose 40% Gel 15 Gram(s) once  dextrose 5%. 1000 milliLiter(s) <Continuous>  dextrose 5%. 1000 milliLiter(s) <Continuous>  dextrose 50% Injectable 25 Gram(s) once  dextrose 50% Injectable 12.5 Gram(s) once  dextrose 50% Injectable 25 Gram(s) once  doxazosin 2 milliGRAM(s) daily  enoxaparin Injectable 40 milliGRAM(s) at bedtime  glucagon  Injectable 1 milliGRAM(s) once  hydrALAZINE Injectable 10 milliGRAM(s) every 2 hours PRN  insulin lispro (ADMELOG) corrective regimen sliding scale   Before meals and at bedtime  labetalol Injectable 10 milliGRAM(s) every 2 hours PRN  melatonin 5 milliGRAM(s) at bedtime  OLANZapine 2.5 milliGRAM(s) every 12 hours PRN  ondansetron Injectable 4 milliGRAM(s) every 6 hours PRN  oxyCODONE    IR 5 milliGRAM(s) every 6 hours PRN  oxyCODONE    IR 10 milliGRAM(s) every 6 hours PRN  pantoprazole  Injectable 40 milliGRAM(s) daily  valproate sodium IVPB 500 milliGRAM(s) every 12 hours      RECENT LABS/IMAGING                          14.0   12.77 )-----------( 294      ( 26 Jan 2022 07:07 )             40.0     01-26    132<L>  |  99  |  16.3  ----------------------------<  117<H>  3.8   |  21.0<L>  |  0.30<L>    Ca    8.4<L>      26 Jan 2022 07:07  Phos  3.3     01-26  Mg     2.0     01-26    TPro  5.0<L>  /  Alb  3.5  /  TBili  0.9  /  DBili  x   /  AST  13  /  ALT  26  /  AlkPhos  70  01-26                  MRI HEAD 1/14 - 1)  in addition to the corpus callosal infiltrative lesion, there are approximately 5 other lesions with associated edema and enhancement. Vasogenic edema is most extensive in the left parietal region. See full description above. Differential considerations, therefore, include the possibility of metastatic disease versus lymphoma rather than glioblastoma. Further assessment and correlation recommended.2)  no evidence of arterial or venous occlusive disease.    MRI ABD 1/14 - Left hepatic lobe atrophy and mild intrahepatic dilatation involving segments 2 and 3. Suspicion for stricturing mass at the junction of the medial and lateral left lobes. 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    MR Brain Stereotactic w/ IV Cont 1/18: 5.2 cm TRV x 3.3 cm AP x 3.1 cm CC solid enhancing lobulated mass in the posterior body of the corpus callosum. Moderate vasogenic edema is noted. A 1 cm enhancing lesion is seen in the LEFT parietal cortex and several subcentimeter enhancing lesions are seen in the bifrontal subcortical white matter.  FINDINGS suspicious for primary CNS lymphoma.    CT Head 1/19: 3.4 x 5.1 cm acute hemorrhage along the biopsy tract in the LEFT parietal lobe extending to the mass within the posterior corpus callosum. Pneumocephalus noted along the biopsy tract as well as within the mass. Overlying RIGHT parietal LEFT parietal craniotomy is noted.    CT Head 1/19: No change since 2:30 PM. Left parietal parenchymal hemorrhage along the biopsy tract to the mass in the splenium of the corpus callosum.    CT Head 1/22: STABLE HEMORRHAGE LEFT PARIETAL LOBE ALONG A BIOPSY TRACT LEADING TO A 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    ----------------------------------------------------------------------------------------  PHYSICAL EXAM  Constitutional - NAD, Comfortable  Extremities - No C/C/E, No calf tenderness   Neurologic Exam -                    Cognitive - AAO to self, situation      Communication - Fluent      Cranial Nerves - Left facial droop     Motor - No focal deficits  Psychiatric - Calm  ----------------------------------------------------------------------------------------  ASSESSMENT/PLAN  81yFemale with functional deficits after 2 weeks of feeling off, now found to have a brain mass and IPH post-biopsy  Brain Mass - Depakote, Decadron  IPH - Stable  Sleep - Melatonin 5mg HS (1/24)  HTN - Amlodipine, Cardura, Hydralazine, Labetalol  Pain - Tylenol, Oxycodone  Confusion - Zyprexa PRN  Oropharyngeal Dysphagia - Pureed/Mildly thick liquids  DVT PPX - SCDs, Lovenox  Rehab - Recommend ACUTE inpatient rehabilitation for the functional deficits consisting of 3 hours of therapy/day & 24 hour RN/daily PMR physician for comorbid medical management. Will continue to follow for ongoing rehab needs and recommendations. Patient will be able to tolerate 3 hours a day.    Continue bedside therapy as well as OOB throughout the day with mobilization throughout the day with staff to maintain cardiopulmonary function and prevention of secondary complications related to debility.     Discussed with rehab clinical team.

## 2022-01-27 NOTE — PROGRESS NOTE ADULT - SUBJECTIVE AND OBJECTIVE BOX
Patient seen this morning. Pending further medical workup. No complaints presently.    REVIEW OF SYSTEMS  Constitutional - No fever,  No fatigue  HEENT - No vertigo, No neck pain  Neurological - No headaches, + memory loss, No loss of strength, No numbness, No tremors  Musculoskeletal - No joint pain, No joint swelling, No muscle pain  Psychiatric - No depression, No anxiety  All other systems were reviewed and were negative.    FUNCTIONAL PROGRESS  1/26 ST  Speech Language Pathology Recommendations: 1. Soft & bite-sized solids, w/thin liquids2. Aspiration precautions3. Meds whole or crushed in puree4. 100% supervision for all meals due to reduced cognition5. Upright for all PO, small bites/sips, slow rate6. Oral care7. Place dentures prior to PO8. SLP to follow     VITALS  T(C): 36.6 (01-27-22 @ 09:00), Max: 36.7 (01-26-22 @ 13:12)  HR: 70 (01-27-22 @ 09:00) (70 - 95)  BP: 102/57 (01-27-22 @ 09:00) (102/57 - 143/72)  RR: 18 (01-27-22 @ 04:36) (17 - 18)  SpO2: 92% (01-27-22 @ 09:00) (92% - 99%)  Wt(kg): --    MEDICATIONS   acetaminophen     Tablet .. 650 milliGRAM(s) every 6 hours PRN  amLODIPine   Tablet 5 milliGRAM(s) daily  dexAMETHasone     Tablet 3 milliGRAM(s) every 6 hours  dextrose 40% Gel 15 Gram(s) once  dextrose 5%. 1000 milliLiter(s) <Continuous>  dextrose 5%. 1000 milliLiter(s) <Continuous>  dextrose 50% Injectable 25 Gram(s) once  dextrose 50% Injectable 12.5 Gram(s) once  dextrose 50% Injectable 25 Gram(s) once  doxazosin 2 milliGRAM(s) daily  enoxaparin Injectable 40 milliGRAM(s) at bedtime  glucagon  Injectable 1 milliGRAM(s) once  hydrALAZINE Injectable 10 milliGRAM(s) every 2 hours PRN  insulin lispro (ADMELOG) corrective regimen sliding scale   Before meals and at bedtime  labetalol Injectable 10 milliGRAM(s) every 2 hours PRN  melatonin 5 milliGRAM(s) at bedtime  OLANZapine 2.5 milliGRAM(s) every 12 hours PRN  ondansetron Injectable 4 milliGRAM(s) every 6 hours PRN  oxyCODONE    IR 5 milliGRAM(s) every 6 hours PRN  oxyCODONE    IR 10 milliGRAM(s) every 6 hours PRN  pantoprazole  Injectable 40 milliGRAM(s) daily  valproic acid 250 milliGRAM(s) two times a day      RECENT LABS/IMAGING                          13.5   19.68 )-----------( 259      ( 27 Jan 2022 07:53 )             39.7     01-27    133<L>  |  99  |  17.4  ----------------------------<  106<H>  4.3   |  22.0  |  0.30<L>    Ca    8.4<L>      27 Jan 2022 07:53  Phos  3.8     01-27  Mg     2.0     01-27    TPro  4.8<L>  /  Alb  3.4  /  TBili  0.8  /  DBili  x   /  AST  14  /  ALT  23  /  AlkPhos  70  01-27        MRI HEAD 1/14 - 1)  in addition to the corpus callosal infiltrative lesion, there are approximately 5 other lesions with associated edema and enhancement. Vasogenic edema is most extensive in the left parietal region. See full description above. Differential considerations, therefore, include the possibility of metastatic disease versus lymphoma rather than glioblastoma. Further assessment and correlation recommended.2)  no evidence of arterial or venous occlusive disease.    MRI ABD 1/14 - Left hepatic lobe atrophy and mild intrahepatic dilatation involving segments 2 and 3. Suspicion for stricturing mass at the junction of the medial and lateral left lobes. 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    MR Brain Stereotactic w/ IV Cont 1/18: 5.2 cm TRV x 3.3 cm AP x 3.1 cm CC solid enhancing lobulated mass in the posterior body of the corpus callosum. Moderate vasogenic edema is noted. A 1 cm enhancing lesion is seen in the LEFT parietal cortex and several subcentimeter enhancing lesions are seen in the bifrontal subcortical white matter.  FINDINGS suspicious for primary CNS lymphoma.    CT Head 1/19: 3.4 x 5.1 cm acute hemorrhage along the biopsy tract in the LEFT parietal lobe extending to the mass within the posterior corpus callosum. Pneumocephalus noted along the biopsy tract as well as within the mass. Overlying RIGHT parietal LEFT parietal craniotomy is noted.    CT Head 1/19: No change since 2:30 PM. Left parietal parenchymal hemorrhage along the biopsy tract to the mass in the splenium of the corpus callosum.    CT Head 1/22: STABLE HEMORRHAGE LEFT PARIETAL LOBE ALONG A BIOPSY TRACT LEADING TO A 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    ----------------------------------------------------------------------------------------  PHYSICAL EXAM  Constitutional - NAD, Comfortable  Extremities - No C/C/E, No calf tenderness   Neurologic Exam -                    Cognitive - AAO to self, situation      Communication - Fluent      Cranial Nerves - Left facial droop     Motor - No focal deficits  Psychiatric - Calm  ----------------------------------------------------------------------------------------  ASSESSMENT/PLAN  81yFemale with functional deficits after 2 weeks of feeling off, now found to have a brain mass and IPH post-biopsy  Brain Mass - Depakote, Decadron  IPH - Stable  Sleep - Melatonin 5mg HS (1/24)  HTN - Amlodipine, Cardura, Hydralazine, Labetalol  Pain - Tylenol, Oxycodone  Confusion - Zyprexa PRN  Oropharyngeal Dysphagia - Soft and bite sized/thins  DVT PPX - SCDs, Lovenox  Rehab - Recommend ACUTE inpatient rehabilitation for the functional deficits consisting of 3 hours of therapy/day & 24 hour RN/daily PMR physician for comorbid medical management. Will continue to follow for ongoing rehab needs and recommendations. Patient will be able to tolerate 3 hours a day.    Continue bedside therapy as well as OOB throughout the day with mobilization throughout the day with staff to maintain cardiopulmonary function and prevention of secondary complications related to debility.     Discussed with rehab clinical team.

## 2022-01-27 NOTE — PROGRESS NOTE ADULT - SUBJECTIVE AND OBJECTIVE BOX
Chief Complaint:   Patient is a 81y old  Female who presents with a chief complaint of Brain mass. Being seen in follow-up consultation for radiology finding of liver lesion.       Interval Events / Subjective: 81y Female PMH HTN, [place] [facility] [place] and fall, also attests to feeling off for 1 month, now found with corpus callosal lesion, now s/p craniotomy for biopsy POD#4, postoperative course complicated with hemorrhage in surgical bed/biopsy tract. Patient seen and evaluated at bedside, reporting no complaints, no overnight events. Patient Mental status improved. Denies nausea, vomiting, abdominal pain, chest pain, shortness of breath, hematemesis, hematochezia, melena. Tolerating diet.       REVIEW OF SYSTEMS:   General: Negative  HEENT: Negative  CV: Negative  Respiratory: Negative  GI: See HPI  : Negative  MSK: Negative  Hematologic: Negative  Skin: Negative    MEDICATIONS:   MEDICATIONS  (STANDING):  amLODIPine   Tablet 5 milliGRAM(s) Oral daily  dexAMETHasone     Tablet 3 milliGRAM(s) Oral every 6 hours  dextrose 40% Gel 15 Gram(s) Oral once  dextrose 5%. 1000 milliLiter(s) (50 mL/Hr) IV Continuous <Continuous>  dextrose 5%. 1000 milliLiter(s) (100 mL/Hr) IV Continuous <Continuous>  dextrose 50% Injectable 25 Gram(s) IV Push once  dextrose 50% Injectable 12.5 Gram(s) IV Push once  dextrose 50% Injectable 25 Gram(s) IV Push once  doxazosin 2 milliGRAM(s) Oral daily  enoxaparin Injectable 40 milliGRAM(s) SubCutaneous at bedtime  glucagon  Injectable 1 milliGRAM(s) IntraMuscular once  insulin lispro (ADMELOG) corrective regimen sliding scale   SubCutaneous Before meals and at bedtime  melatonin 5 milliGRAM(s) Oral at bedtime  pantoprazole  Injectable 40 milliGRAM(s) IV Push daily  valproic acid 250 milliGRAM(s) Oral two times a day    MEDICATIONS  (PRN):  acetaminophen     Tablet .. 650 milliGRAM(s) Oral every 6 hours PRN Mild Pain (1 - 3)  hydrALAZINE Injectable 10 milliGRAM(s) IV Push every 2 hours PRN SBP > 140  labetalol Injectable 10 milliGRAM(s) IV Push every 2 hours PRN SBP > 140  OLANZapine 2.5 milliGRAM(s) Oral every 12 hours PRN agitation  ondansetron Injectable 4 milliGRAM(s) IV Push every 6 hours PRN Nausea and/or Vomiting  oxyCODONE    IR 5 milliGRAM(s) Oral every 6 hours PRN Moderate Pain (4 - 6)  oxyCODONE    IR 10 milliGRAM(s) Oral every 6 hours PRN Severe Pain (7 - 10)      ALLERGIES:   Allergies    Bananas (Angioedema; Anaphylaxis; Short breath)  Kiwi (Angioedema; Anaphylaxis; Short breath)  latex (Unknown)  [place] (Angioedema; Anaphylaxis; Short breath)  No Known Drug Allergies  strawberry (Angioedema; Anaphylaxis; Short breath)    Intolerances        VITAL SIGNS:   Vital Signs Last 24 Hrs  T(C): 36.6 (27 Jan 2022 11:22), Max: 36.7 (26 Jan 2022 13:12)  T(F): 97.9 (27 Jan 2022 11:22), Max: 98 (26 Jan 2022 13:12)  HR: 82 (27 Jan 2022 11:22) (70 - 95)  BP: 109/62 (27 Jan 2022 11:22) (102/57 - 143/72)  BP(mean): --  RR: 18 (27 Jan 2022 11:22) (17 - 18)  SpO2: 100% (27 Jan 2022 11:22) (92% - 100%)  I&O's Summary    26 Jan 2022 07:01  -  27 Jan 2022 07:00  --------------------------------------------------------  IN: 0 mL / OUT: 1600 mL / NET: -1600 mL        PHYSICAL EXAM:   GENERAL:  No acute distress  HEENT:  NC/AT,  conjunctivae clear, sclera -anicteric  CHEST:  Full & symmetric excursion, no increased effort, breath sounds clear  HEART:  Regular rhythm, S1, S2, no murmur/rub/S3/S4,  no edema  ABDOMEN:  Soft, non-tender, non-distended, normoactive bowel sounds.  EXTREMITIES: No cyanosis, clubbing or edema  SKIN:  No rash/erythema/ecchymoses/petechiae/wounds/abscess/warm/dry  NEURO:  Alert, oriented    LABS:  CBC Full  -  ( 27 Jan 2022 07:53 )  WBC Count : 19.68 K/uL  RBC Count : 4.31 M/uL  Hemoglobin : 13.5 g/dL  Hematocrit : 39.7 %  Platelet Count - Automated : 259 K/uL  Mean Cell Volume : 92.1 fl  Mean Cell Hemoglobin : 31.3 pg  Mean Cell Hemoglobin Concentration : 34.0 gm/dL  Auto Neutrophil # : x  Auto Lymphocyte # : x  Auto Monocyte # : x  Auto Eosinophil # : x  Auto Basophil # : x  Auto Neutrophil % : x  Auto Lymphocyte % : x  Auto Monocyte % : x  Auto Eosinophil % : x  Auto Basophil % : x    01-27    133<L>  |  99  |  17.4  ----------------------------<  106<H>  4.3   |  22.0  |  0.30<L>    Ca    8.4<L>      27 Jan 2022 07:53  Phos  3.8     01-27  Mg     2.0     01-27    TPro  4.8<L>  /  Alb  3.4  /  TBili  0.8  /  DBili  x   /  AST  14  /  ALT  23  /  AlkPhos  70  01-27    LIVER FUNCTIONS - ( 27 Jan 2022 07:53 )  Alb: 3.4 g/dL / Pro: 4.8 g/dL / ALK PHOS: 70 U/L / ALT: 23 U/L / AST: 14 U/L / GGT: x                   RADIOLOGY & ADDITIONAL STUDIES (The following images were personally reviewed):    ACC: [id number] EXAM:  CT ABDOMEN AND PELVIS IC                        ACC: [id number] EXAM:  CT CHEST IC                          PROCEDURE DATE:  01/14/2022          INTERPRETATION:  CLINICAL INFORMATION: Brain mass.    COMPARISON: None.    CONTRAST/COMPLICATIONS:  IV Contrast: Omnipaque  95 cc administered   5 cc discarded  Oral Contrast: NONE  Complications: None reported at time of study completion    PROCEDURE:  CT of the Chest, Abdomen and Pelvis was performed.  Sagittal and coronal reformats were performed.    FINDINGS:  CHEST:  LUNGS AND LARGE AIRWAYS: Patent central airways. Emphysematous changes.   Biapical pleural parenchymal scarring. Bilateral lower lobe dependent   changes/atelectasis. Calcified granuloma in the left upper lobe.  PLEURA: No pleural effusion.  VESSELS: Thoracic aorta normal in caliber with atherosclerotic changes.   Coronary artery calcifications.  HEART: Heart size is normal. No pericardial effusion.  MEDIASTINUM AND ANGELITA: Mildly enlarged mediastinal and bilateral hilar   lymph nodes including a right hilar lymph node measuring 1.8 x 1.1 cm   (series 3 image 69).  CHEST WALL AND LOWER NECK: No enlarged axillary lymph nodes.    ABDOMEN AND PELVIS:  LIVER: Hepatic cysts measuring up to 4.5 cm multiple subcentimeter   low-attenuation lesions, too small to characterize. There is a region of   low attenuation within the central aspect of the left hepatic lobe   measuring approximately 2.8 cm with mild biliary ductal dilatation in the   left hepatic lobe. Peripherally calcified structure in the left hepatic   lobe measuring 1.1 cm, possibly a cyst or a portion of the biliary tree.  BILE DUCTS: See above.  GALLBLADDER: No calcified gallstones. Normal caliber wall.  SPLEEN: Within normal limits.  PANCREAS: Within normal limits.  ADRENALS: Within normal limits.  KIDNEYS/URETERS: No hydronephrosis. 1.2 cm cyst in the mid right kidney   with additional subcentimeter renal lesions, too small to characterize.    BLADDER: Unremarkable.  REPRODUCTIVE ORGANS:Hysterectomy.    BOWEL: Colonic diverticulosis without evidence of diverticulitis. No   bowel obstruction. Appendix is normal.  PERITONEUM: No ascites.  VESSELS: Atherosclerotic changes. Abdominal aorta normal in caliber.  RETROPERITONEUM/LYMPH NODES: Mildly prominent periportal lymph nodes   including 1.6 x 0.8 cm (series 3 image 139).  ABDOMINAL WALL: Fat-containing right inguinal hernia.  BONES: Degenerative changes in the spine.    IMPRESSION:    Region of low attenuation in the central aspect of the left hepatic lobe   measuring approximately 2.8 cm with mild biliary ductal dilatation in the   left hepatic lobe and left hepatic lobe atrophy; a pre and post IV   contrast MRI/MRCP of the abdomen is recommended to exclude an underlying   mass.    Mildly enlarged mediastinal and bilateral hilar lymph nodes.    --- End of Report ---            SANDY KAUFMAN MD; Attending Radiologist  This document has been electronically signed. Jan 14 2022  8:41AM      < from: CT Head No Cont (01.22.22 @ 11:56) >  FINDINGS:  Hemorrhage is again noted to be status post a left occipital parietal   craniotomy. There is a tumor centered on the splenium the corpus   callosum. Hemorrhage is present along a biopsy tract and within the   callosal mass is unchanged. Stable mild intraventricular hemorrhage.    Edema within the left greater than right frontal parietal regions appears   stable. Associated mass effect is stable with effacement of the left   greater than right lateral ventricles.    IMPRESSION:  STABLE HEMORRHAGE LEFT PARIETAL LOBE ALONG A BIOPSY TRACT LEADING TO A   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    < end of copied text >

## 2022-01-27 NOTE — PROGRESS NOTE ADULT - PROBLEM SELECTOR PLAN 1
MRI abdomen reported as left hepatic lobe atrophy and mild intrahepatic dilatation, suspicion for stricturing mass at the junction of the medial and lateral left lobes.  Labs reviewed.    -  S/p craniotomy for biopsy (01/19/22). Awaiting pathology report.    - AFP and CEA negative   - LFTs and INR normal. No cirrhosis noted   - As per IR, Dr Toney, will wait for final pathology from brain tumor biopsy before proceeding with biopsy of the liver. MRI abdomen reported as left hepatic lobe atrophy and mild intrahepatic dilatation, suspicion for stricturing mass at the junction of the medial and lateral left lobes.  Labs reviewed.    -  S/p craniotomy for biopsy (01/19/22). Awaiting pathology report.    - AFP and CEA negative   - LFTs and INR normal. 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 No need for liver biopsy as B-cell lymphomas often have systemic involvement and liver lesion likely secondary to metastatic B- cell lymphoma.

## 2022-01-27 NOTE — PROGRESS NOTE ADULT - ASSESSMENT
81y Female PMH HTN, [place] [facility] [place] also attests to feeling off for 1 month, now found with corpus callosal lesion, now s/p craniotomy for biopsy 1/19/22 now POD#8, postoperative course complicated with hemorrhage in surgical bed/biopsy tract.    PLAN:  - D/w Dr. Frausto  - Q4 neuro checks  - Hem/onc consulted ([facility]- Dr. Hamitlon), will follow up recs if any  - Continue  BID  - Melatonin 5 QHS  - On deadron 3 Q 6, will taper to off over next 2 weeks unless otherwise recommended by oncology  - SBP goal 100-140; on amlodipine 5 QD  - On soft and bite sized diet; will d/c low sodium given mild hyponatremia; continue to follow up SLP recs  - GI eval appreciated- no biopsy necessary at this time, signed off  - Continue cardura 2 mg daily; TOV today-- if fails, straight cath and increase cardura dose  - On lovenox 40 for DVT ppx  - PT/OT/PMR/SLP following- recommending AR  - Dispo pending evaluation by hem/onc, can start dispo planning

## 2022-01-27 NOTE — PROGRESS NOTE ADULT - SUBJECTIVE AND OBJECTIVE BOX
INTERVAL HPI/OVERNIGHT EVENTS:  81y Female PMH HTN, [place] [facility] [place], also attests to feeling off for 1 month, now found with corpus callosal lesion, now s/p craniotomy for biopsy 1/19/22 now POD#8, postoperative course complicated with hemorrhage in surgical bed/biopsy tract. Patient seen earlier this AM, states she is bored and wants to leave the hospital. No other complaints     Vital Signs Last 24 Hrs  T(C): 36.6 (27 Jan 2022 11:22), Max: 36.6 (26 Jan 2022 17:30)  T(F): 97.9 (27 Jan 2022 11:22), Max: 97.9 (27 Jan 2022 11:22)  HR: 82 (27 Jan 2022 11:22) (70 - 87)  BP: 109/62 (27 Jan 2022 11:22) (102/57 - 143/72)  BP(mean): --  RR: 18 (27 Jan 2022 11:22) (18 - 18)  SpO2: 100% (27 Jan 2022 11:22) (92% - 100%)    PHYSICAL EXAM:  GENERAL: NAD, well-groomed  HEAD: Left craniotomy incision c/d/i  MALLY COMA SCORE: E- 4 V- 4 M- 6=14  MENTAL STATUS: Awake, alert, oriented to self. Intermittently oriented to place. Not oriented to time. Has both expressive an receptive components of aphasia, expressive > receptive. Able to follow most commands. Able to name some high frequency objects but will perseverate at times.   CRANIAL NERVES: PERRL. EOMI without nystagmus. Facial sensation intact V1-3 distribution b/l. Mild right facial droop. Hearing grossly intact. Speech clear  MOTOR: strength 5/5 LUE/LLE; RUE/RLE 4+/5; + RUE drift  SENSATION: grossly intact to light touch all extremities    LABS:                        13.5   19.68 )-----------( 259      ( 27 Jan 2022 07:53 )             39.7     01-27    133<L>  |  99  |  17.4  ----------------------------<  106<H>  4.3   |  22.0  |  0.30<L>    Ca    8.4<L>      27 Jan 2022 07:53  Phos  3.8     01-27  Mg     2.0     01-27    TPro  4.8<L>  /  Alb  3.4  /  TBili  0.8  /  DBili  x   /  AST  14  /  ALT  23  /  AlkPhos  70  01-27 01-26 @ 07:01  -  01-27 @ 07:00  --------------------------------------------------------  IN: 0 mL / OUT: 1600 mL / NET: -1600 mL    RADIOLOGY & ADDITIONAL TESTS:  CT Head No Cont (01.22.22 @ 11:56)  IMPRESSION:  STABLE HEMORRHAGE LEFT PARIETAL LOBE ALONG A BIOPSY TRACT LEADING TO A   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

## 2022-01-28 PROBLEM — Z00.00 ENCOUNTER FOR PREVENTIVE HEALTH EXAMINATION: Status: ACTIVE | Noted: 2022-01-01

## 2022-01-28 NOTE — PROGRESS NOTE ADULT - ASSESSMENT
81y Female PMH HTN, transferred from [facility] [place] after trip and fall, also attests to feeling off for 1 month, now found with corpus callosal lesion, now s/p craniotomy for biopsy 1/19/22 now POD#9, postoperative course complicated with hemorrhage in surgical bed/biopsy tract    PLAN:  - D/w Dr. Frausto  - Q4 neuro checks  - Hem/onc consulted, will f/u recs if any  - Continue  BID  - Melatonin 5 QHS  - On decadron 3 Q 6, will taper to off over next 2 weeks unless otherwise recommended by oncology  - SBP goal 100-140; on amlodipine 5 QD  - On soft and bite sized diet; continue to follow up SLP recs  - GI eval appreciated- no biopsy necessary at this time, signed off  - Continue cardura 4 mg daily; f/u UA, possible UTI, send Ucx PRN pending UA results  - monitor Na, repeat BMP pending, if continues to downtrend will consult medicine vs nephrology  - On lovenox 40 for DVT ppx  - PT/OT/PMR/SLP following- recommending AR  - Dispo pending

## 2022-01-28 NOTE — PROGRESS NOTE ADULT - SUBJECTIVE AND OBJECTIVE BOX
Patient seen this morning. No complaints at this time. Waiting for oncology evaluation.    REVIEW OF SYSTEMS  + memory loss, + loss of strength    FUNCTIONAL PROGRESS  1/26 ST  Speech Language Pathology Recommendations: 1. Soft & bite-sized solids, w/thin liquids2. Aspiration precautions3. Meds whole or crushed in puree4. 100% supervision for all meals due to reduced cognition5. Upright for all PO, small bites/sips, slow rate6. Oral care7. Place dentures prior to PO8. SLP to follow     VITALS  T(C): 37 (01-28-22 @ 04:00), Max: 37 (01-28-22 @ 04:00)  HR: 100 (01-28-22 @ 04:00) (82 - 100)  BP: 147/76 (01-28-22 @ 04:00) (109/62 - 147/76)  RR: 18 (01-28-22 @ 04:00) (18 - 18)  SpO2: 98% (01-28-22 @ 04:00) (98% - 100%)  Wt(kg): --    MEDICATIONS   acetaminophen     Tablet .. 650 milliGRAM(s) every 6 hours PRN  amLODIPine   Tablet 5 milliGRAM(s) daily  dexAMETHasone     Tablet     dexAMETHasone     Tablet 3 milliGRAM(s) every 6 hours  dextrose 40% Gel 15 Gram(s) once  dextrose 5%. 1000 milliLiter(s) <Continuous>  dextrose 5%. 1000 milliLiter(s) <Continuous>  dextrose 50% Injectable 25 Gram(s) once  dextrose 50% Injectable 12.5 Gram(s) once  dextrose 50% Injectable 25 Gram(s) once  doxazosin 4 milliGRAM(s) at bedtime  enoxaparin Injectable 40 milliGRAM(s) at bedtime  glucagon  Injectable 1 milliGRAM(s) once  hydrALAZINE Injectable 10 milliGRAM(s) every 2 hours PRN  insulin lispro (ADMELOG) corrective regimen sliding scale   Before meals and at bedtime  labetalol Injectable 10 milliGRAM(s) every 2 hours PRN  melatonin 5 milliGRAM(s) at bedtime  OLANZapine 2.5 milliGRAM(s) every 12 hours PRN  ondansetron Injectable 4 milliGRAM(s) every 6 hours PRN  oxyCODONE    IR 5 milliGRAM(s) every 6 hours PRN  oxyCODONE    IR 10 milliGRAM(s) every 6 hours PRN  pantoprazole  Injectable 40 milliGRAM(s) daily  valproic acid 250 milliGRAM(s) two times a day      RECENT LABS/IMAGING                          13.5   30.38 )-----------( 288      ( 28 Jan 2022 09:06 )             39.4     01-27    133<L>  |  99  |  17.4  ----------------------------<  106<H>  4.3   |  22.0  |  0.30<L>    Ca    8.4<L>      27 Jan 2022 07:53  Phos  3.8     01-27  Mg     2.0     01-27    TPro  4.8<L>  /  Alb  3.4  /  TBili  0.8  /  DBili  x   /  AST  14  /  ALT  23  /  AlkPhos  70  01-27          MRI HEAD 1/14 - 1)  in addition to the corpus callosal infiltrative lesion, there are approximately 5 other lesions with associated edema and enhancement. Vasogenic edema is most extensive in the left parietal region. See full description above. Differential considerations, therefore, include the possibility of metastatic disease versus lymphoma rather than glioblastoma. Further assessment and correlation recommended.2)  no evidence of arterial or venous occlusive disease.    MRI ABD 1/14 - Left hepatic lobe atrophy and mild intrahepatic dilatation involving segments 2 and 3. Suspicion for stricturing mass at the junction of the medial and lateral left lobes. 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    MR Brain Stereotactic w/ IV Cont 1/18: 5.2 cm TRV x 3.3 cm AP x 3.1 cm CC solid enhancing lobulated mass in the posterior body of the corpus callosum. Moderate vasogenic edema is noted. A 1 cm enhancing lesion is seen in the LEFT parietal cortex and several subcentimeter enhancing lesions are seen in the bifrontal subcortical white matter.  FINDINGS suspicious for primary CNS lymphoma.    CT Head 1/19: 3.4 x 5.1 cm acute hemorrhage along the biopsy tract in the LEFT parietal lobe extending to the mass within the posterior corpus callosum. Pneumocephalus noted along the biopsy tract as well as within the mass. Overlying RIGHT parietal LEFT parietal craniotomy is noted.    CT Head 1/19: No change since 2:30 PM. Left parietal parenchymal hemorrhage along the biopsy tract to the mass in the splenium of the corpus callosum.    CT Head 1/22: STABLE HEMORRHAGE LEFT PARIETAL LOBE ALONG A BIOPSY TRACT LEADING TO A 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    ----------------------------------------------------------------------------------------  PHYSICAL EXAM  Constitutional - NAD, Comfortable  Extremities - No C/C/E, No calf tenderness   Neurologic Exam -                    Cognitive - AAO to self, situation      Communication - Fluent      Cranial Nerves - Left facial droop     Motor - No focal deficits  Psychiatric - Calm  ----------------------------------------------------------------------------------------  ASSESSMENT/PLAN  81yFemale with functional deficits after 2 weeks of feeling off, now found to have a brain mass and IPH post-biopsy  Brain Mass - Depakote, Decadron taper  IPH - Stable  Sleep - Melatonin 5mg HS (1/24)  HTN - Amlodipine, Cardura, Hydralazine, Labetalol  Pain - Tylenol, Oxycodone  Confusion - Zyprexa PRN  Oropharyngeal Dysphagia - Soft and bite sized/thins  DVT PPX - SCDs, Lovenox  Rehab - Recommend ACUTE inpatient rehabilitation for the functional deficits consisting of 3 hours of therapy/day & 24 hour RN/daily PMR physician for comorbid medical management. Will continue to follow for ongoing rehab needs and recommendations. Patient will be able to tolerate 3 hours a day.    Continue bedside therapy as well as OOB throughout the day with mobilization throughout the day with staff to maintain cardiopulmonary function and prevention of secondary complications related to debility.     Discussed with rehab clinical team.

## 2022-01-28 NOTE — CHART NOTE - NSCHARTNOTEFT_GEN_A_CORE
Oncology recommending liver biopsy  GI reconsulted, d/w Dr. Morales, to reevaluate in AM    Na- 127  2% started. Repeat BMP 23:00  Hospitalist consulted    Will update daughter    Will continue to monitor

## 2022-01-28 NOTE — CONSULT NOTE ADULT - ASSESSMENT
Patient is a 80 y/o F PMH HTN [place] [facility] [place] fall. Patient states she came home from work, didn't feel well and then fell and hit her head into her bathtub.  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     CT of c/ap showed mild MS LADs, and left hepatic mass. MRI abd showed left hepatic lobe atrophy and mild intrahepatic dilatation involving segments 2 and 3. Suspicion for stricturing mass at the junction of the medial and lateral left lobes. 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    Hematology consulted for lymphoma.    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  -MRI abd showed stricturing mass at the junction of the medial and lateral left lobes. 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  -MRI abd findings are concerning for 2nd malignancy as primary CNS lymphoma rarely has disease outside the brain.   -would obtain biopsy of the liver mass to guide treatment, as regimen will be different for primary CNS lymphoma, peripheral DLBCL vs primary billary neoplasm    -pt may need to be transferred to [facility] hospital for inpt chemo if primary CNS lymphoma is confirmed.        Manuel Hamilton MD.  Hematology-Oncology  [facility]  [phone]

## 2022-01-28 NOTE — PROGRESS NOTE ADULT - SUBJECTIVE AND OBJECTIVE BOX
INTERVAL HPI/OVERNIGHT EVENTS:  81y Female PMH HTN, [place] [facility] [place], also attests to feeling off for 1 month, now found with corpus callosal lesion, now s/p craniotomy for biopsy 1/19/22 now POD#9, postoperative course complicated with hemorrhage in surgical bed/biopsy tract. Patient seen earlier today, more confused than yesterday. Concern for UTI with malodorous urine noted on shayla in bed. Elevated WBC    Vital Signs Last 24 Hrs  T(C): 36.3 (28 Jan 2022 11:21), Max: 37 (28 Jan 2022 04:00)  T(F): 97.3 (28 Jan 2022 11:21), Max: 98.6 (28 Jan 2022 04:00)  HR: 97 (28 Jan 2022 11:21) (90 - 100)  BP: 133/75 (28 Jan 2022 11:21) (110/69 - 147/76)  BP(mean): --  RR: 18 (28 Jan 2022 11:21) (18 - 18)  SpO2: 92% (28 Jan 2022 11:21) (92% - 100%)    PHYSICAL EXAM:  GENERAL: NAD, well-groomed  HEAD:  Atraumatic, normocephalic  MALLY COMA SCORE: E- 4 V- 4 M- 6=14  MENTAL STATUS: Awake, alert. Oriented to self only. Has both expressive an receptive components of aphasia, expressive > receptive. Able to follow most commands. Able to name some high frequency objects but will perseverate at times.   CRANIAL NERVES: PERRL. EOMI without nystagmus. Facial sensation intact V1-3 distribution b/l. Mild right facial droop. Hearing grossly intact. Speech clear  MOTOR: strength 5/5 LUE/LLE; RUE 4+/5, RLE 4/5; + RUE drift  SENSATION: grossly intact to light touch all extremities    LABS:                        13.5   30.38 )-----------( 288      ( 28 Jan 2022 09:06 )             39.4     01-28    130<L>  |  95<L>  |  22.3<H>  ----------------------------<  123<H>  4.2   |  23.0  |  0.37<L>    Ca    8.8      28 Jan 2022 09:06  Phos  3.9     01-28  Mg     2.0     01-28    TPro  4.8<L>  /  Alb  3.4  /  TBili  0.8  /  DBili  x   /  AST  14  /  ALT  23  /  AlkPhos  70  01-27 01-27 @ 07:01  -  01-28 @ 07:00  --------------------------------------------------------  IN: 0 mL / OUT: 1 mL / NET: -1 mL    RADIOLOGY & ADDITIONAL TESTS:  CT Head No Cont (01.22.22 @ 11:56)  IMPRESSION:  STABLE HEMORRHAGE LEFT PARIETAL LOBE ALONG A BIOPSY TRACT LEADING TO A   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

## 2022-01-28 NOTE — CONSULT NOTE ADULT - SUBJECTIVE AND OBJECTIVE BOX
Patient is a 82 y/o F PMH HTN transferred from [facility] [place] s/p trip & fall. Patient states she came home from work, didn't feel well and then fell and hit her head into her bathtub.  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     CT of c/ap showed mild MS LADs, and left hepatic mass. Follow up MRI abd showed left hepatic lobe atrophy and mild intrahepatic dilatation involving   segments 2 and 3. Suspicion for stricturing mass at the junction of the   medial and lateral left lobes. 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    Hematology consulted for lymphoma.          Allergies    Bananas (Angioedema; Anaphylaxis; Short breath)  Kiwi (Angioedema; Anaphylaxis; Short breath)  latex (Unknown)  Rock (Angioedema; Anaphylaxis; Short breath)  No Known Drug Allergies  strawberry (Angioedema; Anaphylaxis; Short breath)    Intolerances        PAST MEDICAL & SURGICAL HISTORY:  HTN (hypertension)    No significant past surgical history        FAMILY HISTORY:  No pertinent family history in first degree relatives        Social History:  Lives at home alone.  Tobacco: Denies.  ETOH: Drinks 1-2 glasses of wine daily  Drugs: Denies.    REVIEW OF SYSTEMS:  All other review of systems negative, except as noted in HPI    VITAL SIGNS:  Vital Signs Last 24 Hrs  T(C): 37 (28 Jan 2022 04:00), Max: 37 (28 Jan 2022 04:00)  T(F): 98.6 (28 Jan 2022 04:00), Max: 98.6 (28 Jan 2022 04:00)  HR: 100 (28 Jan 2022 04:00) (82 - 100)  BP: 147/76 (28 Jan 2022 04:00) (109/62 - 147/76)  BP(mean): --  RR: 18 (28 Jan 2022 04:00) (18 - 18)  SpO2: 98% (28 Jan 2022 04:00) (98% - 100%)      PHYSICAL EXAM:     GENERAL: no acute distress  HEENT: EOMI, neck supple  RESPIRATORY: LCTAB/L, no rhonchi, rales, or wheezing  CARDIOVASCULAR: RRR, no murmurs, gallops, rubs  ABDOMINAL: soft, non-tender, non-distended, positive bowel sounds   EXTREMITIES: no clubbing, cyanosis, or edema  NEUROLOGICAL: alert and oriented x 2, non-focal  SKIN: no rashes or lesions   MUSCULOSKELETAL: no gross joint deformity                          13.5   30.38 )-----------( 288      ( 28 Jan 2022 09:06 )             39.4     01-28    130<L>  |  95<L>  |  22.3<H>  ----------------------------<  123<H>  4.2   |  23.0  |  0.37<L>    Ca    8.8      28 Jan 2022 09:06  Phos  3.9     01-28  Mg     2.0     01-28    TPro  4.8<L>  /  Alb  3.4  /  TBili  0.8  /  DBili  x   /  AST  14  /  ALT  23  /  AlkPhos  70  01-27      MEDICATIONS  (STANDING):  amLODIPine   Tablet 5 milliGRAM(s) Oral daily  dexAMETHasone     Tablet   Oral   dexAMETHasone     Tablet 3 milliGRAM(s) Oral every 6 hours  dextrose 40% Gel 15 Gram(s) Oral once  dextrose 5%. 1000 milliLiter(s) (50 mL/Hr) IV Continuous <Continuous>  dextrose 5%. 1000 milliLiter(s) (100 mL/Hr) IV Continuous <Continuous>  dextrose 50% Injectable 25 Gram(s) IV Push once  dextrose 50% Injectable 12.5 Gram(s) IV Push once  dextrose 50% Injectable 25 Gram(s) IV Push once  doxazosin 4 milliGRAM(s) Oral at bedtime  enoxaparin Injectable 40 milliGRAM(s) SubCutaneous at bedtime  glucagon  Injectable 1 milliGRAM(s) IntraMuscular once  insulin lispro (ADMELOG) corrective regimen sliding scale   SubCutaneous Before meals and at bedtime  melatonin 5 milliGRAM(s) Oral at bedtime  pantoprazole  Injectable 40 milliGRAM(s) IV Push daily  valproic acid 250 milliGRAM(s) Oral two times a day    < from: CT Abdomen and Pelvis w/ IV Cont (01.14.22 @ 07:02) >  Region of low attenuation in the central aspect of the left hepatic lobe   measuring approximately 2.8 cm with mild biliary ductal dilatation in the   left hepatic lobe and left hepatic lobe atrophy; a pre and post IV   contrast MRI/MRCP of the abdomen is recommended to exclude an underlying   mass.    Mildly enlarged mediastinal and bilateral hilar lymph nodes.    --- End of Report ---    < end of copied text >  < from: CT Head No Cont (01.19.22 @ 19:56) >    INTERPRETATION:  CLINICAL INDICATION: Post biopsy of left corpus callosal   mass complicated by hemorrhage    5mm axial sections of the brain were obtained from base to vertex,   without the intravenous administration of contrast material. Coronal and   sagittal computer generated reconstructed views are available.    Comparison is made with the prior CT of 1/19/2022 at 2:30 PM.    There is no significant change. There is a mass in the splenium of the   corpus callosum crossing the midline. There is a left parietal craniotomy   defect. Left parietal parenchymal hemorrhage along the tract of the   biopsy is identified measuring 5.7 cm in AP diameter by 3.2 cm   transversely by 4.3 cm in craniocaudal diameter and is unchanged. There   is unchanged mass effect on the lateral ventricles. Postoperative air is   identified in the splenium.    IMPRESSION: No change since 2:30 PM. Left parietal parenchymal hemorrhage   along the biopsy tract to the mass in the splenium of the corpus callosum.    --- End of Report ---    < end of copied text >  < from: CT Head No Cont (01.22.22 @ 11:56) >  IMPRESSION:  STABLE HEMORRHAGE LEFT PARIETAL LOBE ALONG A BIOPSY TRACT LEADING TO A   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    --- End of Report ---    < end of copied text >  < from: MR Brain Stereotactic w/ IV Cont (01.18.22 @ 16:03) >  IMPRESSION:  5.2 cm TRV x 3.3 cm AP x 3.1 cm CC solid enhancing lobulated   mass in the posterior body of the corpus callosum. Moderate vasogenic   edema is noted. A 1 cm enhancing lesion is seen in the LEFT parietal   cortex and several subcentimeter enhancing lesions are seen in the   bifrontal subcortical white matter.  FINDINGS suspicious for primary CNS   lymphoma.    < end of copied text >  < from: MR Abdomen w/wo IV Cont (01.14.22 @ 19:55) >  IMPRESSION:  Left hepatic lobe atrophy and mild intrahepatic dilatation involving   segments 2 and 3. Suspicion for stricturing mass at the junction of the   medial and lateral left lobes. 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    < end of copied text >       Patient is a 80 y/o F PMH HTN transferred from [facility] [place] s/p trip & fall. Patient states she came home from work, didn't feel well and then fell and hit her head into her bathtub.  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     CT of c/ap showed mild MS LADs, and left hepatic mass.   MRI abd showed left hepatic lobe atrophy and mild intrahepatic dilatation involving segments 2 and 3. Suspicion for stricturing mass at the junction of the medial and lateral left lobes. 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    Hematology consulted for lymphoma.          Allergies    Bananas (Angioedema; Anaphylaxis; Short breath)  Kiwi (Angioedema; Anaphylaxis; Short breath)  latex (Unknown)  Rock (Angioedema; Anaphylaxis; Short breath)  No Known Drug Allergies  strawberry (Angioedema; Anaphylaxis; Short breath)    Intolerances        PAST MEDICAL & SURGICAL HISTORY:  HTN (hypertension)    No significant past surgical history        FAMILY HISTORY:  No pertinent family history in first degree relatives        Social History:  Lives at home alone.  Tobacco: Denies.  ETOH: Drinks 1-2 glasses of wine daily  Drugs: Denies.    REVIEW OF SYSTEMS:  All other review of systems negative, except as noted in HPI    VITAL SIGNS:  Vital Signs Last 24 Hrs  T(C): 37 (28 Jan 2022 04:00), Max: 37 (28 Jan 2022 04:00)  T(F): 98.6 (28 Jan 2022 04:00), Max: 98.6 (28 Jan 2022 04:00)  HR: 100 (28 Jan 2022 04:00) (82 - 100)  BP: 147/76 (28 Jan 2022 04:00) (109/62 - 147/76)  BP(mean): --  RR: 18 (28 Jan 2022 04:00) (18 - 18)  SpO2: 98% (28 Jan 2022 04:00) (98% - 100%)      PHYSICAL EXAM:     GENERAL: no acute distress  HEENT: EOMI, neck supple  RESPIRATORY: LCTAB/L, no rhonchi, rales, or wheezing  CARDIOVASCULAR: RRR, no murmurs, gallops, rubs  ABDOMINAL: soft, non-tender, non-distended, positive bowel sounds   EXTREMITIES: no clubbing, cyanosis, or edema  NEUROLOGICAL: alert and oriented x 2, non-focal  SKIN: no rashes or lesions   MUSCULOSKELETAL: no gross joint deformity                          13.5   30.38 )-----------( 288      ( 28 Jan 2022 09:06 )             39.4     01-28    130<L>  |  95<L>  |  22.3<H>  ----------------------------<  123<H>  4.2   |  23.0  |  0.37<L>    Ca    8.8      28 Jan 2022 09:06  Phos  3.9     01-28  Mg     2.0     01-28    TPro  4.8<L>  /  Alb  3.4  /  TBili  0.8  /  DBili  x   /  AST  14  /  ALT  23  /  AlkPhos  70  01-27      MEDICATIONS  (STANDING):  amLODIPine   Tablet 5 milliGRAM(s) Oral daily  dexAMETHasone     Tablet   Oral   dexAMETHasone     Tablet 3 milliGRAM(s) Oral every 6 hours  dextrose 40% Gel 15 Gram(s) Oral once  dextrose 5%. 1000 milliLiter(s) (50 mL/Hr) IV Continuous <Continuous>  dextrose 5%. 1000 milliLiter(s) (100 mL/Hr) IV Continuous <Continuous>  dextrose 50% Injectable 25 Gram(s) IV Push once  dextrose 50% Injectable 12.5 Gram(s) IV Push once  dextrose 50% Injectable 25 Gram(s) IV Push once  doxazosin 4 milliGRAM(s) Oral at bedtime  enoxaparin Injectable 40 milliGRAM(s) SubCutaneous at bedtime  glucagon  Injectable 1 milliGRAM(s) IntraMuscular once  insulin lispro (ADMELOG) corrective regimen sliding scale   SubCutaneous Before meals and at bedtime  melatonin 5 milliGRAM(s) Oral at bedtime  pantoprazole  Injectable 40 milliGRAM(s) IV Push daily  valproic acid 250 milliGRAM(s) Oral two times a day    < from: CT Abdomen and Pelvis w/ IV Cont (01.14.22 @ 07:02) >  Region of low attenuation in the central aspect of the left hepatic lobe   measuring approximately 2.8 cm with mild biliary ductal dilatation in the   left hepatic lobe and left hepatic lobe atrophy; a pre and post IV   contrast MRI/MRCP of the abdomen is recommended to exclude an underlying   mass.    Mildly enlarged mediastinal and bilateral hilar lymph nodes.    --- End of Report ---    < end of copied text >  < from: CT Head No Cont (01.19.22 @ 19:56) >    INTERPRETATION:  CLINICAL INDICATION: Post biopsy of left corpus callosal   mass complicated by hemorrhage    5mm axial sections of the brain were obtained from base to vertex,   without the intravenous administration of contrast material. Coronal and   sagittal computer generated reconstructed views are available.    Comparison is made with the prior CT of 1/19/2022 at 2:30 PM.    There is no significant change. There is a mass in the splenium of the   corpus callosum crossing the midline. There is a left parietal craniotomy   defect. Left parietal parenchymal hemorrhage along the tract of the   biopsy is identified measuring 5.7 cm in AP diameter by 3.2 cm   transversely by 4.3 cm in craniocaudal diameter and is unchanged. There   is unchanged mass effect on the lateral ventricles. Postoperative air is   identified in the splenium.    IMPRESSION: No change since 2:30 PM. Left parietal parenchymal hemorrhage   along the biopsy tract to the mass in the splenium of the corpus callosum.    --- End of Report ---    < end of copied text >  < from: CT Head No Cont (01.22.22 @ 11:56) >  IMPRESSION:  STABLE HEMORRHAGE LEFT PARIETAL LOBE ALONG A BIOPSY TRACT LEADING TO A   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    --- End of Report ---    < end of copied text >  < from: MR Brain Stereotactic w/ IV Cont (01.18.22 @ 16:03) >  IMPRESSION:  5.2 cm TRV x 3.3 cm AP x 3.1 cm CC solid enhancing lobulated   mass in the posterior body of the corpus callosum. Moderate vasogenic   edema is noted. A 1 cm enhancing lesion is seen in the LEFT parietal   cortex and several subcentimeter enhancing lesions are seen in the   bifrontal subcortical white matter.  FINDINGS suspicious for primary CNS   lymphoma.    < end of copied text >  < from: MR Abdomen w/wo IV Cont (01.14.22 @ 19:55) >  IMPRESSION:  Left hepatic lobe atrophy and mild intrahepatic dilatation involving   segments 2 and 3. Suspicion for stricturing mass at the junction of the   medial and lateral left lobes. 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    < end of copied text >

## 2022-01-29 NOTE — CONSULT NOTE ADULT - SUBJECTIVE AND OBJECTIVE BOX
Patient is a 81y old  Female who presents with a chief complaint of Brain mass (2022 14:38)      HPI:  Patient is a 81y old  Female who presents with a chief complaint of brain mass.    HPI: Patient is a 82 y/o F PMH HTN [place] [facility] [place]. Patient states she came home from work, didn't feel well and then fell and hit her head into her bathtub. Unsure if she syncopized. Denies LOC. Patient called EMS herself. Patient states she's been feeling "off" for 2 weeks but can't describe what was wrong with her. She noticed today that she felt unsteady on her feet. Currently denies headache, use of blood thinners, cancer history, visual disturbance, nausea/vomiting, fever/chills, weakness, numbness/tingling. 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 (2022 20:29)    Medicine consulted to co manage for electrolyte imbalances and suspected UTI     Overnight/ AM events:  Patient seen and examined at bedside. No acute events overnight. Patient states she is doing well post bx, but reports she has had decreased po intake. She has not been eating and drinking as much. She is focused on her dx and next steps, it is really worrying her to try and figure out what will happen. She is fearful of any bad outcomes and that is what is primarily consuming her thoughts. D/w her in regards to her improving her oral fluid intake which she said she would try to improve. Aside from this asked her if she had any evidence of infectious sx as her wbc is elevated and she reported no sx. D/w her that her UA returned positive but she states       PAST MEDICAL & SURGICAL HISTORY:  No past medical history     PSHX  No significant past surgical history      Social History:   Tabacco - denies  ETOH - denies   Illicit drug abuse - denies    FAMILY HISTORY:  No pertinent family history in first degree relatives  Denies any hx cancer     Allergies    Bananas (Angioedema; Anaphylaxis; Short breath)  Kiwi (Angioedema; Anaphylaxis; Short breath)  latex (Unknown)  Rock (Angioedema; Anaphylaxis; Short breath)  No Known Drug Allergies  strawberry (Angioedema; Anaphylaxis; Short breath)      HOME MEDICATIONS : None     REVIEW OF SYSTEMS:    CONSTITUTIONAL: No fever, weight loss, or fatigue  EYES: No eye pain, visual disturbances, or discharge  NECK: No pain or stiffness  RESPIRATORY: No cough, wheezing, chills or hemoptysis; No shortness of breath  CARDIOVASCULAR: No chest pain, palpitations, dizziness, or leg swelling  GASTROINTESTINAL: No abdominal or epigastric pain. No nausea, vomiting, or hematemesis; No diarrhea or constipation. No melena or hematochezia.  GENITOURINARY: No dysuria, frequency, hematuria, or incontinence  NEUROLOGICAL: No headaches, memory loss, loss of strength, numbness, or tremors   +unsteady gait      SKIN: No itching, burning, rashes, or lesions   LYMPH NODES: No enlarged glands  ENDOCRINE: No heat or cold intolerance; No hair loss  PSYCHIATRIC: No depression, anxiety, mood swings, or difficulty sleeping  HEME/LYMPH: No easy bruising, or bleeding gums  ALLERGY AND IMMUNOLOGIC: No hives or eczema    MEDICATIONS  (STANDING):  amLODIPine   Tablet 5 milliGRAM(s) Oral daily  cefTRIAXone   IVPB 1000 milliGRAM(s) IV Intermittent every 24 hours  dexAMETHasone     Tablet   Oral   dexAMETHasone     Tablet 3 milliGRAM(s) Oral every 6 hours  dextrose 40% Gel 15 Gram(s) Oral once  dextrose 5%. 1000 milliLiter(s) (50 mL/Hr) IV Continuous <Continuous>  dextrose 5%. 1000 milliLiter(s) (100 mL/Hr) IV Continuous <Continuous>  dextrose 50% Injectable 12.5 Gram(s) IV Push once  dextrose 50% Injectable 25 Gram(s) IV Push once  dextrose 50% Injectable 25 Gram(s) IV Push once  doxazosin 4 milliGRAM(s) Oral at bedtime  enoxaparin Injectable 40 milliGRAM(s) SubCutaneous at bedtime  glucagon  Injectable 1 milliGRAM(s) IntraMuscular once  insulin lispro (ADMELOG) corrective regimen sliding scale   SubCutaneous Before meals and at bedtime  melatonin 5 milliGRAM(s) Oral at bedtime  pantoprazole  Injectable 40 milliGRAM(s) IV Push daily  sodium chloride 2% . 1000 milliLiter(s) (50 mL/Hr) IV Continuous <Continuous>  valproic acid 250 milliGRAM(s) Oral two times a day    MEDICATIONS  (PRN):  acetaminophen     Tablet .. 650 milliGRAM(s) Oral every 6 hours PRN Mild Pain (1 - 3)  hydrALAZINE Injectable 10 milliGRAM(s) IV Push every 2 hours PRN SBP > 140  labetalol Injectable 10 milliGRAM(s) IV Push every 2 hours PRN SBP > 140  OLANZapine 2.5 milliGRAM(s) Oral every 12 hours PRN agitation  ondansetron Injectable 4 milliGRAM(s) IV Push every 6 hours PRN Nausea and/or Vomiting  oxyCODONE    IR 5 milliGRAM(s) Oral every 6 hours PRN Moderate Pain (4 - 6)  oxyCODONE    IR 10 milliGRAM(s) Oral every 6 hours PRN Severe Pain (7 - 10)      Vital Signs Last 24 Hrs  T(C): 36.8 (2022 18:00), Max: 37 (2022 04:00)  T(F): 98.3 (2022 18:00), Max: 98.6 (2022 04:00)  HR: 92 (2022 18:00) (92 - 100)  BP: 147/69 (2022 18:00) (110/69 - 147/76)  BP(mean): --  RR: 18 (2022 18:00) (18 - 18)  SpO2: 95% (2022 18:00) (92% - 100%)    PHYSICAL EXAM:    GENERAL: NAD, well-groomed, well-developed  HEAD:  Shaved head   EYES: EOMI, PERRLA, conjunctiva and sclera clear  NECK: Supple, No JVD, Normal thyroid  NERVOUS SYSTEM:  Alert & Oriented to self/ place aphasia expressive/ receptive +ve   issues with memory recall   CHEST/LUNG: CTA  b/l,  no rales, rhonchi, wheezing, or rubs  HEART: Regular rate and rhythm; No murmurs, rubs, or gallops  ABDOMEN: Soft, Nontender, Nondistended; Bowel sounds present  EXTREMITIES:  2+ Peripheral Pulses, No clubbing, cyanosis, or edema ,   LYMPH: No lymphadenopathy noted  SKIN: No rashes or lesions    LABS:                        13.5   30.38 )-----------( 288      ( 2022 09:06 )             39.4         129<L>  |  95<L>  |  19.9  ----------------------------<  152<H>  4.4   |  22.0  |  0.29<L>    Ca    8.7      2022 22:56  Phos  3.9       Mg     2.0         TPro  4.8<L>  /  Alb  3.4  /  TBili  0.8  /  DBili  x   /  AST  14  /  ALT  23  /  AlkPhos  70        Urinalysis Basic - ( 2022 16:16 )    Color: Yellow / Appearance: Slightly Turbid / S.015 / pH: x  Gluc: x / Ketone: Small  / Bili: Negative / Urobili: Negative mg/dL   Blood: x / Protein: 15 / Nitrite: Negative   Leuk Esterase: Moderate / RBC: 3-5 /HPF / WBC 11-25 /HPF   Sq Epi: x / Non Sq Epi: Occasional / Bacteria: Moderate    RADIOLOGY & ADDITIONAL STUDIES:  CT Head No Cont (22 @ 11:56)  IMPRESSION:  STABLE HEMORRHAGE LEFT PARIETAL LOBE ALONG A BIOPSY TRACT LEADING TO A   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   Patient is a 81y old  Female who presents with a chief complaint of Brain mass (2022 14:38)      HPI:  Patient is a 81y old  Female who presents with a chief complaint of brain mass.    HPI: Patient is a 82 y/o F PMH HTN [place] [facility] [place]. Patient states she came home from work, didn't feel well and then fell and hit her head into her bathtub. Unsure if she syncopized. Denies LOC. Patient called EMS herself. Patient states she's been feeling "off" for 2 weeks but can't describe what was wrong with her. She noticed today that she felt unsteady on her feet. Currently denies headache, use of blood thinners, cancer history, visual disturbance, nausea/vomiting, fever/chills, weakness, numbness/tingling. 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 (2022 20:29)    Medicine consulted to co manage for electrolyte imbalances and suspected UTI     Overnight/ AM events:  Patient seen and examined at bedside. No acute events overnight. Patient states she is doing well post bx, but reports she has had decreased po intake. She has not been eating and drinking as much. She is focused on her dx and next steps, it is really worrying her to try and figure out what will happen. She is fearful of any bad outcomes and that is what is primarily consuming her thoughts. D/w her in regards to her improving her oral fluid intake which she said she would try to improve. Aside from this asked her if she had any evidence of infectious sx as her wbc is elevated and she reported no sx. D/w her that her UA returned positive but she states       PAST MEDICAL & SURGICAL HISTORY:  HTN    PSHX  No significant past surgical history      Social History:   Tabacco - denies  ETOH - denies   Illicit drug abuse - denies    FAMILY HISTORY:  No pertinent family history in first degree relatives  Denies any hx cancer     Allergies    Bananas (Angioedema; Anaphylaxis; Short breath)  Kiwi (Angioedema; Anaphylaxis; Short breath)  latex (Unknown)  Kunkle (Angioedema; Anaphylaxis; Short breath)  No Known Drug Allergies  strawberry (Angioedema; Anaphylaxis; Short breath)      HOME MEDICATIONS : None     REVIEW OF SYSTEMS:    CONSTITUTIONAL: No fever, weight loss, or fatigue  EYES: No eye pain, visual disturbances, or discharge  NECK: No pain or stiffness  RESPIRATORY: No cough, wheezing, chills or hemoptysis; No shortness of breath  CARDIOVASCULAR: No chest pain, palpitations, dizziness, or leg swelling  GASTROINTESTINAL: No abdominal or epigastric pain. No nausea, vomiting, or hematemesis; No diarrhea or constipation. No melena or hematochezia.  GENITOURINARY: No dysuria, frequency, hematuria, or incontinence  NEUROLOGICAL: No headaches, memory loss, loss of strength, numbness, or tremors   +unsteady gait      SKIN: No itching, burning, rashes, or lesions   LYMPH NODES: No enlarged glands  ENDOCRINE: No heat or cold intolerance; No hair loss  PSYCHIATRIC: No depression, anxiety, mood swings, or difficulty sleeping  HEME/LYMPH: No easy bruising, or bleeding gums  ALLERGY AND IMMUNOLOGIC: No hives or eczema    MEDICATIONS  (STANDING):  amLODIPine   Tablet 5 milliGRAM(s) Oral daily  cefTRIAXone   IVPB 1000 milliGRAM(s) IV Intermittent every 24 hours  dexAMETHasone     Tablet   Oral   dexAMETHasone     Tablet 3 milliGRAM(s) Oral every 6 hours  dextrose 40% Gel 15 Gram(s) Oral once  dextrose 5%. 1000 milliLiter(s) (50 mL/Hr) IV Continuous <Continuous>  dextrose 5%. 1000 milliLiter(s) (100 mL/Hr) IV Continuous <Continuous>  dextrose 50% Injectable 12.5 Gram(s) IV Push once  dextrose 50% Injectable 25 Gram(s) IV Push once  dextrose 50% Injectable 25 Gram(s) IV Push once  doxazosin 4 milliGRAM(s) Oral at bedtime  enoxaparin Injectable 40 milliGRAM(s) SubCutaneous at bedtime  glucagon  Injectable 1 milliGRAM(s) IntraMuscular once  insulin lispro (ADMELOG) corrective regimen sliding scale   SubCutaneous Before meals and at bedtime  melatonin 5 milliGRAM(s) Oral at bedtime  pantoprazole  Injectable 40 milliGRAM(s) IV Push daily  sodium chloride 2% . 1000 milliLiter(s) (50 mL/Hr) IV Continuous <Continuous>  valproic acid 250 milliGRAM(s) Oral two times a day    MEDICATIONS  (PRN):  acetaminophen     Tablet .. 650 milliGRAM(s) Oral every 6 hours PRN Mild Pain (1 - 3)  hydrALAZINE Injectable 10 milliGRAM(s) IV Push every 2 hours PRN SBP > 140  labetalol Injectable 10 milliGRAM(s) IV Push every 2 hours PRN SBP > 140  OLANZapine 2.5 milliGRAM(s) Oral every 12 hours PRN agitation  ondansetron Injectable 4 milliGRAM(s) IV Push every 6 hours PRN Nausea and/or Vomiting  oxyCODONE    IR 5 milliGRAM(s) Oral every 6 hours PRN Moderate Pain (4 - 6)  oxyCODONE    IR 10 milliGRAM(s) Oral every 6 hours PRN Severe Pain (7 - 10)      Vital Signs Last 24 Hrs  T(C): 36.8 (2022 18:00), Max: 37 (2022 04:00)  T(F): 98.3 (2022 18:00), Max: 98.6 (2022 04:00)  HR: 92 (2022 18:00) (92 - 100)  BP: 147/69 (2022 18:00) (110/69 - 147/76)  BP(mean): --  RR: 18 (2022 18:00) (18 - 18)  SpO2: 95% (2022 18:00) (92% - 100%)    PHYSICAL EXAM:    GENERAL: NAD, well-groomed, well-developed  HEAD:  Shaved head   EYES: EOMI, PERRLA, conjunctiva and sclera clear  NECK: Supple, No JVD, Normal thyroid  NERVOUS SYSTEM:  Alert & Oriented to self/ place aphasia expressive/ receptive +ve   issues with memory recall   CHEST/LUNG: CTA  b/l,  no rales, rhonchi, wheezing, or rubs  HEART: Regular rate and rhythm; No murmurs, rubs, or gallops  ABDOMEN: Soft, Nontender, Nondistended; Bowel sounds present  EXTREMITIES:  2+ Peripheral Pulses, No clubbing, cyanosis, or edema ,   LYMPH: No lymphadenopathy noted  SKIN: No rashes or lesions    LABS:                        13.5   30.38 )-----------( 288      ( 2022 09:06 )             39.4         129<L>  |  95<L>  |  19.9  ----------------------------<  152<H>  4.4   |  22.0  |  0.29<L>    Ca    8.7      2022 22:56  Phos  3.9       Mg     2.0         TPro  4.8<L>  /  Alb  3.4  /  TBili  0.8  /  DBili  x   /  AST  14  /  ALT  23  /  AlkPhos  70        Urinalysis Basic - ( 2022 16:16 )    Color: Yellow / Appearance: Slightly Turbid / S.015 / pH: x  Gluc: x / Ketone: Small  / Bili: Negative / Urobili: Negative mg/dL   Blood: x / Protein: 15 / Nitrite: Negative   Leuk Esterase: Moderate / RBC: 3-5 /HPF / WBC 11-25 /HPF   Sq Epi: x / Non Sq Epi: Occasional / Bacteria: Moderate    RADIOLOGY & ADDITIONAL STUDIES:  CT Head No Cont (22 @ 11:56)  IMPRESSION:  STABLE HEMORRHAGE LEFT PARIETAL LOBE ALONG A BIOPSY TRACT LEADING TO A   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

## 2022-01-29 NOTE — PROGRESS NOTE ADULT - ASSESSMENT
11 y.o. Female with hx of HTN, transferred from [facility] after s/p mechanical fall, with ongoing sx of feeling unwell x 1 month, admitted to the NEURO sx service with corpus callosal lesion, now s/p craniotomy for biopsy 1/19/22 now POD#10, postoperative course complicated with hemorrhage in surgical bed/biopsy tract. Also noted on ct/ MRI AP showed mild MS LADs, and left hepatic mass. MRI abd showed left hepatic lobe atrophy and mild intrahepatic dilatation involving segments 2 and 3. Suspicion for stricturing mass at the junction of the medial and lateral left lobes. GI and heme/onc continue to follow the pt for next steps. Medicine consulted for co management as pt with noted electrolyte abnormalities, up trending leukocytosis and UTI.       Primary CNS lymphoma  -s/p bx with post op complication with hemorrhage in surgical bed/biopsy tract.   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  -MRI abd showed stricturing mass at the junction of the medial and lateral left lobes. 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  -MRI abd findings are concerning for 2nd malignancy as primary CNS lymphoma rarely has disease outside the brain per heme/onc.   They requested bx of the liver mass to further guide tx and transfer to [facility] ,    but GI recommended  that there was no need for bx.   - Defer further work up and management to Neurosx/ GI/ heme/ onc   -c/w pain mx per neuro sx  - c/w depakene for seizure ppx per neuro sx  - c/w decadron per neurosx  -c/w zyprexa prn per neurosx  - heme/ onc f/u noted and appreciated  - GI f/u noted and appreciated   - PT/OT/PMR/SLP  f/u noted and appreciated- c/w following the pt     Hyponatremia  - hypochloremic hyponatremia 2/2 dehydration/ decreased po intake   -  UA noted with slightly elevated SG likely dehydration/ decreased po intake related  -  2% saline IVF @ 50ml/hr given - this am Na - 136 -     2% NS d/sagrario , started on Nn tabs   - d/w the patient to focus on oral hydration  - c/w trending bmp - re check in the am   - goal sodium to be above 130   - if further recs needed can also consult nephro     Hyperkalemia   - potassium 5.4 unclear etiology as the pt is not on any potassium sparing medications   - however repeat potassium is wnl   - c/w monitoring bmp closely     UTI  - UA positive - difficult to assess if pt has sx of dysuria with her current cognitive state-   - afebrile   - leukocytosis 30 - downtrended to 25 with iv abx ,     (but pt is also on decadron)   - f/u U cx   - c/w rocephin x3-5 days based on pts sx   - trend WBC / fever     Leukocytosis   - likely related to UTI and also pt is on decadron  - if wbc is not down trending on current tx then would recommend to see if any other source is present. In that case recommend f/u cxr and f/u B cx   - c/w trending      HTN  - bp stable  -c/w SBP goal 100-140  -c/w amlodipine 5mg po qd   -c/w labetalol or hydralzine IV prn if outside the parameters per neuro  sx     Urinary retention - s/p straight cath x 2 yesterday with 250 and 500 ml ,   this am bladder scan with 200 ml - continue bladder scan - continue doxazosin ,   if continue with retention may need Orozco     DVT ppx  -c/w lovenox sq qd     Dispo: Per neuro sx-   Medicine will continue to follow for co management  81 y.o. Female with hx of HTN, transferred from [facility] [place] after s/p mechanical fall, with ongoing sx of feeling unwell x 1 month, admitted to the NEURO sx service with corpus callosal lesion, now s/p craniotomy for biopsy 1/19/22 now POD#10, postoperative course complicated with hemorrhage in surgical bed/biopsy tract. Also noted on ct/ MRI AP showed mild MS LADs, and left hepatic mass. MRI abd showed left hepatic lobe atrophy and mild intrahepatic dilatation involving segments 2 and 3. Suspicion for stricturing mass at the junction of the medial and lateral left lobes. GI and heme/onc continue to follow the pt for next steps. Medicine consulted for co management as pt with noted electrolyte abnormalities, up trending leukocytosis and UTI.       Primary CNS lymphoma  -s/p bx with post op complication with hemorrhage in surgical bed/biopsy tract.   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  -MRI abd showed stricturing mass at the junction of the medial and lateral left lobes. 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  -MRI abd findings are concerning for 2nd malignancy as primary CNS lymphoma rarely has disease outside the brain per heme/onc.   They requested bx of the liver mass to further guide tx and transfer to [facility] ,    but GI recommended  that there was no need for bx.   - Defer further work up and management to Neurosx/ GI/ heme/ onc   -c/w pain mx per neuro sx  - c/w depakene for seizure ppx per neuro sx  - c/w decadron per neurosx  -c/w zyprexa prn per neurosx  - heme/ onc f/u noted and appreciated  - GI f/u noted and appreciated   - PT/OT/PMR/SLP  f/u noted and appreciated- c/w following the pt     Hyponatremia  - hypochloremic hyponatremia 2/2 dehydration/ decreased po intake   -  UA noted with slightly elevated SG likely dehydration/ decreased po intake related  -  2% saline IVF @ 50ml/hr given - this am Na - 136 -     2% NS d/sagrario , started on Nn tabs   - d/w the patient to focus on oral hydration  - c/w trending bmp - re check in the am   - goal sodium to be above 130   - if further recs needed can also consult nephro     Hyperkalemia   - potassium 5.4 unclear etiology as the pt is not on any potassium sparing medications   - however repeat potassium is wnl   - c/w monitoring bmp closely     UTI  - UA positive - difficult to assess if pt has sx of dysuria with her current cognitive state-   - afebrile   - leukocytosis 30 - downtrended to 25 with iv abx ,     (but pt is also on decadron)   - f/u U cx   - c/w rocephin x3-5 days based on pts sx   - trend WBC / fever     Leukocytosis   - likely related to UTI and also pt is on decadron  - if wbc is not down trending on current tx then would recommend to see if any other source is present. In that case recommend f/u cxr and f/u B cx   - c/w trending      HTN  - bp stable  -c/w SBP goal 100-140  -c/w amlodipine 5mg po qd   -c/w labetalol or hydralzine IV prn if outside the parameters per neuro  sx     Urinary retention - s/p straight cath x 2 yesterday with 250 and 500 ml ,   this am bladder scan with 200 ml - continue bladder scan - continue doxazosin ,   if continue with retention may need Orozco     DVT ppx  -c/w lovenox sq qd     Dispo: Per neuro sx-   Medicine will continue to follow for co management

## 2022-01-29 NOTE — PROGRESS NOTE ADULT - SUBJECTIVE AND OBJECTIVE BOX
INTERVAL HPI/OVERNIGHT EVENTS:  81y Female PMH HTN, [place] [facility] [place], also attests to feeling off for 1 month, now found with corpus callosal lesion, now s/p craniotomy for biopsy 22 now POD#10. Patient seen earlier today, confusion improved today    Vital Signs Last 24 Hrs  T(C): 36.3 (2022 12:00), Max: 37 (2022 04:50)  T(F): 97.4 (2022 12:00), Max: 98.6 (2022 04:50)  HR: 105 (2022 12:00) (72 - 105)  BP: 140/80 (2022 12:00) (110/61 - 147/69)  BP(mean): --  RR: 18 (2022 12:00) (18 - 18)  SpO2: 97% (2022 12:00) (92% - 97%)    PHYSICAL EXAM:  GENERAL: NAD, well-groomed  HEAD:  Atraumatic, normocephalic  MALLY COMA SCORE: E- 4 V- 4 M- 6=14  MENTAL STATUS: Awake, alert. Oriented to self and hospital today intermittently. Does not know date. Has both expressive an receptive components of aphasia, expressive > receptive. Able to follow most commands. Able to name some high frequency objects-- pen and straw today, and their purpose  CRANIAL NERVES: PERRL. EOMI without nystagmus. Facial sensation intact V1-3 distribution b/l. Face grossly symmetric today. Hearing grossly intact. Speech clear  MOTOR: strength 5/5 LUE/LLE; RUE/RLE 4+/5; drift improved today  SENSATION: grossly intact to light touch all extremities    LABS:                        12.4   25.44 )-----------( 269      ( 2022 09:36 )             36.8     -    136  |  100  |  18.9  ----------------------------<  116<H>  4.5   |  25.0  |  0.38<L>    Ca    8.7      2022 09:36  Phos  3.4       Mg     2.1         Urinalysis Basic - ( 2022 16:16 )    Color: Yellow / Appearance: Slightly Turbid / S.015 / pH: x  Gluc: x / Ketone: Small  / Bili: Negative / Urobili: Negative mg/dL   Blood: x / Protein: 15 / Nitrite: Negative   Leuk Esterase: Moderate / RBC: 3-5 /HPF / WBC 11-25 /HPF   Sq Epi: x / Non Sq Epi: Occasional / Bacteria: Moderate    RADIOLOGY & ADDITIONAL TESTS:  CT Head No Cont (22 @ 04:15)  IMPRESSION:  Interval disbursement of extensive left occipital parietal hemorrhage. No   new hemorrhage. Preliminary report provided by [facility] JOSE L GARCIA M.D.;[facility] RADIOLOGIST.

## 2022-01-29 NOTE — CONSULT NOTE ADULT - ASSESSMENT
81y Female PMH HTN, [place] [facility] [place] and [place], also attests to feeling off for 1 month, now found with corpus callosal lesion, now s/p craniotomy for biopsy 1/19/22 now POD#9, postoperative course complicated with hemorrhage in surgical bed/biopsy tract   81 y.o. Female with hx of HTN, [place] [facility] [place]/p mechanical fall, with ongoing sx of feeling unwell x 1 month, admitted to the NEURO sx service with corpus callosal lesion, now s/p craniotomy for biopsy 1/19/22 now POD#10, postoperative course complicated with hemorrhage in surgical bed/biopsy tract. Also noted on ct/ MRI AP showed mild MS LADs, and left hepatic mass. MRI abd showed left hepatic lobe atrophy and mild intrahepatic dilatation involving segments 2 and 3. Suspicion for stricturing mass at the junction of the medial and lateral left lobes. GI and heme/onc continue to follow the pt for next steps. Medicine consulted for co management as pt with noted electrolyte abnormalities, up trending leukocytosis and UTI.       Primary CNS lymphoma  -s/p bx with post op complication with hemorrhage in surgical bed/biopsy tract.   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  -MRI abd showed stricturing mass at the junction of the medial and lateral left lobes. 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  -MRI abd findings are concerning for 2nd malignancy as primary CNS lymphoma rarely has disease outside the brain per heme/onc. They requested bx of the liver mass to further guide tx, but GI recommended  that there was no need for bx.   - Defer further work up and management to Neurosx/ GI/ heme/ onc   -c/w pain mx per neuro sx  - c/w depakene for seizure ppx per neuro sx  - c/w decadron per neurosx  -c/w zyprexa prn per neurosx  - heme/ onc f/u noted and appreciated  - GI f/u noted and appreciated   - PT/OT/PMR/SLP  f/u noted and appreciated- c/w following the pt     Hyponatremia  - hypochloremic hyponatremia 2/2 dehydration/ decreased po intake   - f/u urine lytes   - UA noted with slightly elevated SG likely dehydration/ decreased po intake related  - agree with low dose NS IVF as repeat sodium is up trending from 127 to 129  - can c/w 2% saline IVF @ 50ml/hr   - d/w the patient to focus on oral hydration  - c/w trending bmp - re check in the am   - goal sodium to be above 130   - if further recs needed can also consult nephro     Hyperkalemia   - potassium 5.4 unclear etiology as the pt is not on any potassium sparing medications   - however repeat potassium is wnl   - c/w monitoring bmp closely     UTI  - UA positive - difficult to assess if pt has sx of dysuria with her current cognitive state  - afebrile   - leukocytosis 30 (but pt is also on decadron)   - f/u U cx (not collected as of yet)   - agree with treatment   - c/w rocephin x3-5 days based on pts sx     Leukocytosis   - likely related to UTI and also pt is on decadron  - wbc trended upward to 30   - if wbc is not down trending on current tx then would recommend to see if any other source is present. In that case recommend f/u cxr and f/u B cx   - c/w trending      HTN  - bp stable  -c/w SBP goal 100-140  -c/w amlodipine 5mg po qd   -c/w labetalol or hydralzine IV prn if outside the parameters per neuro  sx     DVT ppx  -c/w lovenox sq qd     Dispo: Per neuro sx- to [facility] when stable  Medicine will continue to follow for co management

## 2022-01-29 NOTE — PROGRESS NOTE ADULT - SUBJECTIVE AND OBJECTIVE BOX
Patient seen and examined . AAOX2 , comfortable , follows commands  s/p craniotomy for biopsy 22 now POD#2. Aphasia expressive and receptive +     CC : brain mass     Overnight/ AM events:  Patient seen and examined at bedside. No acute events overnight.     MEDICATIONS  (STANDING):  amLODIPine   Tablet 5 milliGRAM(s) Oral daily  cefTRIAXone   IVPB 1000 milliGRAM(s) IV Intermittent every 24 hours  dexAMETHasone     Tablet   Oral   dexAMETHasone     Tablet 3 milliGRAM(s) Oral every 6 hours  dextrose 40% Gel 15 Gram(s) Oral once  dextrose 5%. 1000 milliLiter(s) (50 mL/Hr) IV Continuous <Continuous>  dextrose 5%. 1000 milliLiter(s) (100 mL/Hr) IV Continuous <Continuous>  dextrose 50% Injectable 25 Gram(s) IV Push once  dextrose 50% Injectable 12.5 Gram(s) IV Push once  dextrose 50% Injectable 25 Gram(s) IV Push once  doxazosin 4 milliGRAM(s) Oral at bedtime  enoxaparin Injectable 40 milliGRAM(s) SubCutaneous at bedtime  glucagon  Injectable 1 milliGRAM(s) IntraMuscular once  insulin lispro (ADMELOG) corrective regimen sliding scale   SubCutaneous Before meals and at bedtime  melatonin 5 milliGRAM(s) Oral at bedtime  pantoprazole  Injectable 40 milliGRAM(s) IV Push daily  sodium chloride 1 Gram(s) Oral every 12 hours  valproic acid 250 milliGRAM(s) Oral two times a day    MEDICATIONS  (PRN):  acetaminophen     Tablet .. 650 milliGRAM(s) Oral every 6 hours PRN Mild Pain (1 - 3)  hydrALAZINE Injectable 10 milliGRAM(s) IV Push every 2 hours PRN SBP > 140  labetalol Injectable 10 milliGRAM(s) IV Push every 2 hours PRN SBP > 140  OLANZapine 2.5 milliGRAM(s) Oral every 12 hours PRN agitation  ondansetron Injectable 4 milliGRAM(s) IV Push every 6 hours PRN Nausea and/or Vomiting  oxyCODONE    IR 5 milliGRAM(s) Oral every 6 hours PRN Moderate Pain (4 - 6)  oxyCODONE    IR 10 milliGRAM(s) Oral every 6 hours PRN Severe Pain (7 - 10)      LABS:                          12.4   25.44 )-----------( 269      ( 2022 09:36 )             36.8         136  |  100  |  18.9  ----------------------------<  116<H>  4.5   |  25.0  |  0.38<L>    Ca    8.7      2022 09:36  Phos  3.4       Mg     2.1             Urinalysis Basic - ( 2022 16:16 )    Color: Yellow / Appearance: Slightly Turbid / S.015 / pH: x  Gluc: x / Ketone: Small  / Bili: Negative / Urobili: Negative mg/dL   Blood: x / Protein: 15 / Nitrite: Negative   Leuk Esterase: Moderate / RBC: 3-5 /HPF / WBC 11-25 /HPF   Sq Epi: x / Non Sq Epi: Occasional / Bacteria: Moderate        RADIOLOGY & ADDITIONAL TESTS:    < from: CT Head No Cont (22 @ 04:15) >    ACC: [id number] EXAM:  CT BRAIN                          PROCEDURE DATE:  2022          INTERPRETATION:  Exam: CT Head Without Contrast  Exam date and time: 2022 4:09 AM  Age: 81 years old Clinical indication: Pain; Headache    TECHNIQUE: Imaging protocol: Computed tomography of the head without   contrast.    COMPARISON: CT HEAD 2022 11:54 AM    FINDINGS:  Brain: Extensive hypodensity involving the parietal lobe on the left is   unchanged. Left occipital hemorrhage measures approximately 5.1 x 3.2 cm,   slightly more dispersed when compared to the prior examination,   accounting for differences in technique. No new hemorrhage. Subdural   collection under the patient's craniotomy defect measures 6 mm in   thickness, unchanged.Callosal mass is obscured by surrounding hemorrhage   which has dispersed when compared to the prior examination. 6 mm   left-to-right midline shift is unchanged.    Cerebral ventricles: Mass effect on the occipital horn of the left   lateral ventricle is similar to the prior examination. Small amount of   hemorrhage is seen dependently in the occipital horn of the right lateral   ventricle, unchanged.    Paranasal sinuses: Visualized sinuses are unremarkable. No fluid levels.   Mastoid air cells: Visualized mastoid air cells are well aerated.    Bones/joints: Left occipital parietal craniotomy changes are again noted.  Soft tissues: Unremarkable.    IMPRESSION:  Interval disbursement of extensive left occipital parietal hemorrhage. No   new hemorrhage. Preliminary report provided by [facility] JOSE L GARCIA M.D.;AD RADIOLOGIST.    --- End of Report ---    < end of copied text >        REVIEW OF SYSTEMS:    AAOX2  to self and hospital , not to year , aphasia+ ,   all other systems are reviewed and are negative       Vital Signs Last 24 Hrs  T(C): 36.3 (2022 12:00), Max: 37 (2022 04:50)  T(F): 97.4 (2022 12:00), Max: 98.6 (2022 04:50)  HR: 105 (2022 12:00) (72 - 105)  BP: 140/80 (2022 12:00) (110/61 - 147/69)  BP(mean): --  RR: 18 (2022 12:00) (18 - 18)  SpO2: 97% (2022 12:00) (92% - 97%)    PHYSICAL EXAM:    GENERAL: NAD, well-groomed, well-developed  HEAD:  Atraumatic, Normocephalic  EYES: EOMI, PERRLA, conjunctiva and sclera clear  NECK: Supple, No JVD, Normal thyroid  NERVOUS SYSTEM:  Alert & Oriented X2, aphasia expressive > receptive ,   L UE/LE 5/5 , R UE/LE 4+/ 5 , follows commands   CHEST/LUNG: CTA b/l ,  no  rales, rhonchi, wheezing, or rubs  HEART: Regular rate and rhythm; No murmurs, rubs, or gallops  ABDOMEN: Soft, Nontender, Nondistended; Bowel sounds present  EXTREMITIES:  2+ Peripheral Pulses, No clubbing, cyanosis, or edema  LYMPH: No lymphadenopathy noted  SKIN: No rashes or lesions  Prima fit +      Patient seen and examined . AAOX2 , comfortable , follows commands  s/p craniotomy for biopsy 22 now POD#10. Aphasia expressive and receptive +     CC : brain mass     Overnight/ AM events:  Patient seen and examined at bedside. No acute events overnight.     MEDICATIONS  (STANDING):  amLODIPine   Tablet 5 milliGRAM(s) Oral daily  cefTRIAXone   IVPB 1000 milliGRAM(s) IV Intermittent every 24 hours  dexAMETHasone     Tablet   Oral   dexAMETHasone     Tablet 3 milliGRAM(s) Oral every 6 hours  dextrose 40% Gel 15 Gram(s) Oral once  dextrose 5%. 1000 milliLiter(s) (50 mL/Hr) IV Continuous <Continuous>  dextrose 5%. 1000 milliLiter(s) (100 mL/Hr) IV Continuous <Continuous>  dextrose 50% Injectable 25 Gram(s) IV Push once  dextrose 50% Injectable 12.5 Gram(s) IV Push once  dextrose 50% Injectable 25 Gram(s) IV Push once  doxazosin 4 milliGRAM(s) Oral at bedtime  enoxaparin Injectable 40 milliGRAM(s) SubCutaneous at bedtime  glucagon  Injectable 1 milliGRAM(s) IntraMuscular once  insulin lispro (ADMELOG) corrective regimen sliding scale   SubCutaneous Before meals and at bedtime  melatonin 5 milliGRAM(s) Oral at bedtime  pantoprazole  Injectable 40 milliGRAM(s) IV Push daily  sodium chloride 1 Gram(s) Oral every 12 hours  valproic acid 250 milliGRAM(s) Oral two times a day    MEDICATIONS  (PRN):  acetaminophen     Tablet .. 650 milliGRAM(s) Oral every 6 hours PRN Mild Pain (1 - 3)  hydrALAZINE Injectable 10 milliGRAM(s) IV Push every 2 hours PRN SBP > 140  labetalol Injectable 10 milliGRAM(s) IV Push every 2 hours PRN SBP > 140  OLANZapine 2.5 milliGRAM(s) Oral every 12 hours PRN agitation  ondansetron Injectable 4 milliGRAM(s) IV Push every 6 hours PRN Nausea and/or Vomiting  oxyCODONE    IR 5 milliGRAM(s) Oral every 6 hours PRN Moderate Pain (4 - 6)  oxyCODONE    IR 10 milliGRAM(s) Oral every 6 hours PRN Severe Pain (7 - 10)      LABS:                          12.4   25.44 )-----------( 269      ( 2022 09:36 )             36.8         136  |  100  |  18.9  ----------------------------<  116<H>  4.5   |  25.0  |  0.38<L>    Ca    8.7      2022 09:36  Phos  3.4       Mg     2.1             Urinalysis Basic - ( 2022 16:16 )    Color: Yellow / Appearance: Slightly Turbid / S.015 / pH: x  Gluc: x / Ketone: Small  / Bili: Negative / Urobili: Negative mg/dL   Blood: x / Protein: 15 / Nitrite: Negative   Leuk Esterase: Moderate / RBC: 3-5 /HPF / WBC 11-25 /HPF   Sq Epi: x / Non Sq Epi: Occasional / Bacteria: Moderate        RADIOLOGY & ADDITIONAL TESTS:    < from: CT Head No Cont (22 @ 04:15) >    ACC: [id number] EXAM:  CT BRAIN                          PROCEDURE DATE:  2022          INTERPRETATION:  Exam: CT Head Without Contrast  Exam date and time: 2022 4:09 AM  Age: 81 years old Clinical indication: Pain; Headache    TECHNIQUE: Imaging protocol: Computed tomography of the head without   contrast.    COMPARISON: CT HEAD 2022 11:54 AM    FINDINGS:  Brain: Extensive hypodensity involving the parietal lobe on the left is   unchanged. Left occipital hemorrhage measures approximately 5.1 x 3.2 cm,   slightly more dispersed when compared to the prior examination,   accounting for differences in technique. No new hemorrhage. Subdural   collection under the patient's craniotomy defect measures 6 mm in   thickness, unchanged.Callosal mass is obscured by surrounding hemorrhage   which has dispersed when compared to the prior examination. 6 mm   left-to-right midline shift is unchanged.    Cerebral ventricles: Mass effect on the occipital horn of the left   lateral ventricle is similar to the prior examination. Small amount of   hemorrhage is seen dependently in the occipital horn of the right lateral   ventricle, unchanged.    Paranasal sinuses: Visualized sinuses are unremarkable. No fluid levels.   Mastoid air cells: Visualized mastoid air cells are well aerated.    Bones/joints: Left occipital parietal craniotomy changes are again noted.  Soft tissues: Unremarkable.    IMPRESSION:  Interval disbursement of extensive left occipital parietal hemorrhage. No   new hemorrhage. Preliminary report provided by [facility] JOSE L GARCIA M.D.;AD RADIOLOGIST.    --- End of Report ---    < end of copied text >        REVIEW OF SYSTEMS:    AAOX2  to self and hospital , not to year , aphasia+ ,   all other systems are reviewed and are negative       Vital Signs Last 24 Hrs  T(C): 36.3 (2022 12:00), Max: 37 (2022 04:50)  T(F): 97.4 (2022 12:00), Max: 98.6 (2022 04:50)  HR: 105 (2022 12:00) (72 - 105)  BP: 140/80 (2022 12:00) (110/61 - 147/69)  BP(mean): --  RR: 18 (2022 12:00) (18 - 18)  SpO2: 97% (2022 12:00) (92% - 97%)    PHYSICAL EXAM:    GENERAL: NAD, well-groomed, well-developed  HEAD:  Atraumatic, Normocephalic  EYES: EOMI, PERRLA, conjunctiva and sclera clear  NECK: Supple, No JVD, Normal thyroid  NERVOUS SYSTEM:  Alert & Oriented X2, aphasia expressive > receptive ,   L UE/LE 5/5 , R UE/LE 4+/ 5 , follows commands   CHEST/LUNG: CTA b/l ,  no  rales, rhonchi, wheezing, or rubs  HEART: Regular rate and rhythm; No murmurs, rubs, or gallops  ABDOMEN: Soft, Nontender, Nondistended; Bowel sounds present  EXTREMITIES:  2+ Peripheral Pulses, No clubbing, cyanosis, or edema  LYMPH: No lymphadenopathy noted  SKIN: No rashes or lesions  Prima fit +      Patient seen and examined . AAOX2 , comfortable , follows commands  s/p craniotomy for biopsy 22 now POD#10. Aphasia expressive and receptive +     CC : brain mass     Overnight/ AM events:  Patient seen and examined at bedside. No acute events overnight.     MEDICATIONS  (STANDING):  amLODIPine   Tablet 5 milliGRAM(s) Oral daily  cefTRIAXone   IVPB 1000 milliGRAM(s) IV Intermittent every 24 hours  dexAMETHasone     Tablet   Oral   dexAMETHasone     Tablet 3 milliGRAM(s) Oral every 6 hours  dextrose 40% Gel 15 Gram(s) Oral once  dextrose 5%. 1000 milliLiter(s) (50 mL/Hr) IV Continuous <Continuous>  dextrose 5%. 1000 milliLiter(s) (100 mL/Hr) IV Continuous <Continuous>  dextrose 50% Injectable 25 Gram(s) IV Push once  dextrose 50% Injectable 12.5 Gram(s) IV Push once  dextrose 50% Injectable 25 Gram(s) IV Push once  doxazosin 4 milliGRAM(s) Oral at bedtime  enoxaparin Injectable 40 milliGRAM(s) SubCutaneous at bedtime  glucagon  Injectable 1 milliGRAM(s) IntraMuscular once  insulin lispro (ADMELOG) corrective regimen sliding scale   SubCutaneous Before meals and at bedtime  melatonin 5 milliGRAM(s) Oral at bedtime  pantoprazole  Injectable 40 milliGRAM(s) IV Push daily  sodium chloride 1 Gram(s) Oral every 12 hours  valproic acid 250 milliGRAM(s) Oral two times a day    MEDICATIONS  (PRN):  acetaminophen     Tablet .. 650 milliGRAM(s) Oral every 6 hours PRN Mild Pain (1 - 3)  hydrALAZINE Injectable 10 milliGRAM(s) IV Push every 2 hours PRN SBP > 140  labetalol Injectable 10 milliGRAM(s) IV Push every 2 hours PRN SBP > 140  OLANZapine 2.5 milliGRAM(s) Oral every 12 hours PRN agitation  ondansetron Injectable 4 milliGRAM(s) IV Push every 6 hours PRN Nausea and/or Vomiting  oxyCODONE    IR 5 milliGRAM(s) Oral every 6 hours PRN Moderate Pain (4 - 6)  oxyCODONE    IR 10 milliGRAM(s) Oral every 6 hours PRN Severe Pain (7 - 10)      LABS:                          12.4   25.44 )-----------( 269      ( 2022 09:36 )             36.8         136  |  100  |  18.9  ----------------------------<  116<H>  4.5   |  25.0  |  0.38<L>    Ca    8.7      2022 09:36  Phos  3.4       Mg     2.1             Urinalysis Basic - ( 2022 16:16 )    Color: Yellow / Appearance: Slightly Turbid / S.015 / pH: x  Gluc: x / Ketone: Small  / Bili: Negative / Urobili: Negative mg/dL   Blood: x / Protein: 15 / Nitrite: Negative   Leuk Esterase: Moderate / RBC: 3-5 /HPF / WBC 11-25 /HPF   Sq Epi: x / Non Sq Epi: Occasional / Bacteria: Moderate        RADIOLOGY & ADDITIONAL TESTS:    < from: CT Head No Cont (22 @ 04:15) >    ACC: [id number] EXAM:  CT BRAIN                          PROCEDURE DATE:  2022          INTERPRETATION:  Exam: CT Head Without Contrast  Exam date and time: 2022 4:09 AM  Age: 81 years old Clinical indication: Pain; Headache    TECHNIQUE: Imaging protocol: Computed tomography of the head without   contrast.    COMPARISON: CT HEAD 2022 11:54 AM    FINDINGS:  Brain: Extensive hypodensity involving the parietal lobe on the left is   unchanged. Left occipital hemorrhage measures approximately 5.1 x 3.2 cm,   slightly more dispersed when compared to the prior examination,   accounting for differences in technique. No new hemorrhage. Subdural   collection under the patient's craniotomy defect measures 6 mm in   thickness, unchanged.Callosal mass is obscured by surrounding hemorrhage   which has dispersed when compared to the prior examination. 6 mm   left-to-right midline shift is unchanged.    Cerebral ventricles: Mass effect on the occipital horn of the left   lateral ventricle is similar to the prior examination. Small amount of   hemorrhage is seen dependently in the occipital horn of the right lateral   ventricle, unchanged.    Paranasal sinuses: Visualized sinuses are unremarkable. No fluid levels.   Mastoid air cells: Visualized mastoid air cells are well aerated.    Bones/joints: Left occipital parietal craniotomy changes are again noted.  Soft tissues: Unremarkable.    IMPRESSION:  Interval disbursement of extensive left occipital parietal hemorrhage. No   new hemorrhage. Preliminary report provided by [facility] JOSE L GARCIA M.D.;AD RADIOLOGIST.    --- End of Report ---    < end of copied text >        REVIEW OF SYSTEMS:    AAOX2  to self and hospital , not to year , aphasia+ ,   all other systems are reviewed and are negative       Vital Signs Last 24 Hrs  T(C): 36.3 (2022 12:00), Max: 37 (2022 04:50)  T(F): 97.4 (2022 12:00), Max: 98.6 (2022 04:50)  HR: 105 (2022 12:00) (72 - 105)  BP: 140/80 (2022 12:00) (110/61 - 147/69)  BP(mean): --  RR: 18 (2022 12:00) (18 - 18)  SpO2: 97% (2022 12:00) (92% - 97%)    PHYSICAL EXAM:    GENERAL: NAD, well-groomed, well-developed  HEAD: s/p L craniotomy , site clean and dry , staples +   EYES: EOMI, PERRLA, conjunctiva and sclera clear  NECK: Supple, No JVD, Normal thyroid  NERVOUS SYSTEM:  Alert & Oriented X2, aphasia expressive > receptive ,   L UE/LE 5/5 , R UE/LE 4+/ 5 , follows commands   CHEST/LUNG: CTA b/l ,  no  rales, rhonchi, wheezing, or rubs  HEART: Regular rate and rhythm; No murmurs, rubs, or gallops  ABDOMEN: Soft, Nontender, Nondistended; Bowel sounds present  EXTREMITIES:  2+ Peripheral Pulses, No clubbing, cyanosis, or edema  LYMPH: No lymphadenopathy noted  SKIN: No rashes or lesions  Prima fit +

## 2022-01-29 NOTE — PROGRESS NOTE ADULT - ASSESSMENT
81y Female PMH HTN, transferred from [facility] [place] after trip and fall, also attests to feeling off for 1 month, now found with corpus callosal lesion, now s/p craniotomy for biopsy 1/19/22 now POD#10, postoperative course complicated with hemorrhage in surgical bed/biopsy tract    PLAN:  - D/w Dr. Sams  - Q4 neuro checks  - Hem/onc consulted, recommending biopsy of hepatic lesion  - GI reconsulted yesterday- to see today vs tomorrow   - Continue  BID  - Melatonin 5 QHS  - On decadron taper unless otherwise recommended by oncology  - SBP goal 100-140; on amlodipine 5 QD  - On soft and bite sized diet; continue to follow up SLP recs  - Continue cardura 4 mg daily  - Na improved; 2% dced given rapid increase; salt tabs 1 g Q12 ordered to avoid rapid decrease; will repeat BMP this afternoon and tonight  - Hospitalist consulted, appreciated  - On lovenox 40 for DVT ppx  - PT/OT/PMR/SLP following- recommending AR  - Dispo on hold, pending biopsy, possible need to transfer to [facility] for inpatient chemo

## 2022-01-30 NOTE — PROGRESS NOTE ADULT - PROBLEM SELECTOR PLAN 1
MRI abdomen reported as left hepatic lobe atrophy and mild intrahepatic dilatation, suspicion for stricturing mass at the junction of the medial and lateral left lobes.  Labs reviewed.    -  S/p craniotomy for biopsy (01/19/22).  - Brain biopsy results + for B-cell lymphoma.    - AFP and CEA negative   - Labs & images reviewed. No cirrhosis noted   - Will further discuss with IR tomorrow for tentative liver biopsy.    - NPO after midnight.    - Please hold Lovenox

## 2022-01-30 NOTE — PROGRESS NOTE ADULT - SUBJECTIVE AND OBJECTIVE BOX
Chief Complaint: Complaint:   Patient is a 81y old  Female who presents with a chief complaint of Brain mass. Being seen in follow-up consultation for radiology finding of liver lesion.     Interval Events / Subjective: 81y Female PMH HTN, [place] [facility] [place], also attests to feeling off for 1 month, now found with corpus callosal lesion, now s/p craniotomy for biopsy POD#4, postoperative course complicated with hemorrhage in surgical bed/biopsy tract. Brain biopsy + for B-cell lymphoma. GI reconsulted for Liver biopsy. Patient seen and evaluated at bedside, reporting no complaints, no overnight events. Denies nausea, vomiting, abdominal pain, chest pain, shortness of breath, hematemesis, hematochezia, melena. Tolerating diet. Alert and oriented but forgetful at times.     REVIEW OF SYSTEMS:   General: Negative  HEENT: Negative  CV: Negative  Respiratory: Negative  GI: See HPI  : Negative  MSK: Negative  Hematologic: Negative  Skin: Negative    MEDICATIONS:   MEDICATIONS  (STANDING):  amLODIPine   Tablet 5 milliGRAM(s) Oral daily  cefTRIAXone   IVPB 1000 milliGRAM(s) IV Intermittent every 24 hours  dexAMETHasone     Tablet 2 milliGRAM(s) Oral every 6 hours  dexAMETHasone     Tablet   Oral   dextrose 40% Gel 15 Gram(s) Oral once  dextrose 5%. 1000 milliLiter(s) (50 mL/Hr) IV Continuous <Continuous>  dextrose 5%. 1000 milliLiter(s) (100 mL/Hr) IV Continuous <Continuous>  dextrose 50% Injectable 25 Gram(s) IV Push once  dextrose 50% Injectable 12.5 Gram(s) IV Push once  dextrose 50% Injectable 25 Gram(s) IV Push once  doxazosin 4 milliGRAM(s) Oral at bedtime  enoxaparin Injectable 40 milliGRAM(s) SubCutaneous at bedtime  glucagon  Injectable 1 milliGRAM(s) IntraMuscular once  insulin lispro (ADMELOG) corrective regimen sliding scale   SubCutaneous Before meals and at bedtime  melatonin 5 milliGRAM(s) Oral at bedtime  pantoprazole  Injectable 40 milliGRAM(s) IV Push daily  sodium chloride 1 Gram(s) Oral every 12 hours  valproic acid 250 milliGRAM(s) Oral two times a day    MEDICATIONS  (PRN):  acetaminophen     Tablet .. 650 milliGRAM(s) Oral every 6 hours PRN Mild Pain (1 - 3)  hydrALAZINE Injectable 10 milliGRAM(s) IV Push every 2 hours PRN SBP > 140  labetalol Injectable 10 milliGRAM(s) IV Push every 2 hours PRN SBP > 140  OLANZapine 2.5 milliGRAM(s) Oral every 12 hours PRN agitation  ondansetron Injectable 4 milliGRAM(s) IV Push every 6 hours PRN Nausea and/or Vomiting      ALLERGIES:   Allergies    Bananas (Angioedema; Anaphylaxis; Short breath)  Kiwi (Angioedema; Anaphylaxis; Short breath)  latex (Unknown)  Rock (Angioedema; Anaphylaxis; Short breath)  No Known Drug Allergies  strawberry (Angioedema; Anaphylaxis; Short breath)    Intolerances        VITAL SIGNS:   Vital Signs Last 24 Hrs  T(C): 36.7 (2022 04:18), Max: 36.7 (2022 04:18)  T(F): 98.1 (2022 04:18), Max: 98.1 (2022 04:18)  HR: 81 (2022 04:18) (81 - 100)  BP: 122/69 (2022 04:18) (122/69 - 170/77)  BP(mean): --  RR: 18 (2022 20:26) (18 - 18)  SpO2: 94% (2022 20:26) (93% - 94%)  I&O's Summary    2022 07:01  -  2022 07:00  --------------------------------------------------------  IN: 0 mL / OUT: 1420 mL / NET: -1420 mL    2022 07:01  -  2022 12:42  --------------------------------------------------------  IN: 220 mL / OUT: 0 mL / NET: 220 mL        PHYSICAL EXAM:   GENERAL:  No acute distress  HEENT:  NC/AT,  conjunctivae clear, sclera -anicteric  CHEST:  Full & symmetric excursion, no increased effort, breath sounds clear  HEART:  Regular rhythm, S1, S2, no murmur/rub/S3/S4,  no edema  ABDOMEN:  Soft, non-tender, non-distended, normoactive bowel sounds.   EXTREMITIES: No cyanosis, clubbing or edema  SKIN:  No rash/erythema/ecchymoses/petechiae/wounds/abscess/warm/dry  NEURO:  Alert, oriented. forgetful at times.     LABS:  CBC Full  -  ( 2022 10:51 )  WBC Count : 21.79 K/uL  RBC Count : 3.83 M/uL  Hemoglobin : 12.0 g/dL  Hematocrit : 35.9 %  Platelet Count - Automated : 276 K/uL  Mean Cell Volume : 93.7 fl  Mean Cell Hemoglobin : 31.3 pg  Mean Cell Hemoglobin Concentration : 33.4 gm/dL  Auto Neutrophil # : x  Auto Lymphocyte # : x  Auto Monocyte # : x  Auto Eosinophil # : x  Auto Basophil # : x  Auto Neutrophil % : x  Auto Lymphocyte % : x  Auto Monocyte % : x  Auto Eosinophil % : x  Auto Basophil % : x        134<L>  |  99  |  21.2<H>  ----------------------------<  135<H>  4.3   |  21.0<L>  |  0.30<L>    Ca    8.4<L>      2022 10:51  Phos  3.1       Mg     2.0               Urinalysis Basic - ( 2022 16:16 )    Color: Yellow / Appearance: Slightly Turbid / S.015 / pH: x  Gluc: x / Ketone: Small  / Bili: Negative / Urobili: Negative mg/dL   Blood: x / Protein: 15 / Nitrite: Negative   Leuk Esterase: Moderate / RBC: 3-5 /HPF / WBC 11-25 /HPF   Sq Epi: x / Non Sq Epi: Occasional / Bacteria: Moderate          RADIOLOGY & ADDITIONAL STUDIES (The following images were personally reviewed):      RADIOLOGY & ADDITIONAL STUDIES (The following images were personally reviewed):    ACC: [id number] EXAM:  CT ABDOMEN AND PELVIS IC                        ACC: [id number] EXAM:  CT CHEST IC                          PROCEDURE DATE:  2022          INTERPRETATION:  CLINICAL INFORMATION: Brain mass.    COMPARISON: None.    CONTRAST/COMPLICATIONS:  IV Contrast: Omnipaque  95 cc administered   5 cc discarded  Oral Contrast: NONE  Complications: None reported at time of study completion    PROCEDURE:  CT of the Chest, Abdomen and Pelvis was performed.  Sagittal and coronal reformats were performed.    FINDINGS:  CHEST:  LUNGS AND LARGE AIRWAYS: Patent central airways. Emphysematous changes.   Biapical pleural parenchymal scarring. Bilateral lower lobe dependent   changes/atelectasis. Calcified granuloma in the left upper lobe.  PLEURA: No pleural effusion.  VESSELS: Thoracic aorta normal in caliber with atherosclerotic changes.   Coronary artery calcifications.  HEART: Heart size is normal. No pericardial effusion.  MEDIASTINUM AND ANGELITA: Mildly enlarged mediastinal and bilateral hilar   lymph nodes including a right hilar lymph node measuring 1.8 x 1.1 cm   (series 3 image 69).  CHEST WALL AND LOWER NECK: No enlarged axillary lymph nodes.    ABDOMEN AND PELVIS:  LIVER: Hepatic cysts measuring up to 4.5 cm multiple subcentimeter   low-attenuation lesions, too small to characterize. There is a region of   low attenuation within the central aspect of the left hepatic lobe   measuring approximately 2.8 cm with mild biliary ductal dilatation in the   left hepatic lobe. Peripherally calcified structure in the left hepatic   lobe measuring 1.1 cm, possibly a cyst or a portion of the biliary tree.  BILE DUCTS: See above.  GALLBLADDER: No calcified gallstones. Normal caliber wall.  SPLEEN: Within normal limits.  PANCREAS: Within normal limits.  ADRENALS: Within normal limits.  KIDNEYS/URETERS: No hydronephrosis. 1.2 cm cyst in the mid right kidney   with additional subcentimeter renal lesions, too small to characterize.    BLADDER: Unremarkable.  REPRODUCTIVE ORGANS:Hysterectomy.    BOWEL: Colonic diverticulosis without evidence of diverticulitis. No   bowel obstruction. Appendix is normal.  PERITONEUM: No ascites.  VESSELS: Atherosclerotic changes. Abdominal aorta normal in caliber.  RETROPERITONEUM/LYMPH NODES: Mildly prominent periportal lymph nodes   including 1.6 x 0.8 cm (series 3 image 139).  ABDOMINAL WALL: Fat-containing right inguinal hernia.  BONES: Degenerative changes in the spine.    IMPRESSION:    Region of low attenuation in the central aspect of the left hepatic lobe   measuring approximately 2.8 cm with mild biliary ductal dilatation in the   left hepatic lobe and left hepatic lobe atrophy; a pre and post IV   contrast MRI/MRCP of the abdomen is recommended to exclude an underlying   mass.    Mildly enlarged mediastinal and bilateral hilar lymph nodes.    --- End of Report ---            SANDY KAUFMAN MD; Attending Radiologist  This document has been electronically signed. 2022  8:41AM      < from: CT Head No Cont (22 @ 11:56) >  FINDINGS:  Hemorrhage is again noted to be status post a left occipital parietal   craniotomy. There is a tumor centered on the splenium the corpus   callosum. Hemorrhage is present along a biopsy tract and within the   callosal mass is unchanged. Stable mild intraventricular hemorrhage.    Edema within the left greater than right frontal parietal regions appears   stable. Associated mass effect is stable with effacement of the left   greater than right lateral ventricles.    IMPRESSION:  STABLE HEMORRHAGE LEFT PARIETAL LOBE ALONG A BIOPSY TRACT LEADING TO A   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    < end of copied text >

## 2022-01-30 NOTE — PROGRESS NOTE ADULT - ASSESSMENT
81 y.o. Female with hx of HTN, transferred from [facility] after s/p mechanical fall, with ongoing sx of feeling unwell x 1 month, admitted to the NEURO sx service with corpus callosal lesion, now s/p craniotomy for biopsy 1/19/22 now POD#10, postoperative course complicated with hemorrhage in surgical bed/biopsy tract. Also noted on ct/ MRI AP showed mild MS LADs, and left hepatic mass. MRI abd showed left hepatic lobe atrophy and mild intrahepatic dilatation involving segments 2 and 3. Suspicion for stricturing mass at the junction of the medial and lateral left lobes. GI and heme/onc continue to follow the pt for next steps. Medicine consulted for co management as pt with noted electrolyte abnormalities, up trending leukocytosis and UTI.       Primary CNS lymphoma  -s/p bx with post op complication with hemorrhage in surgical bed/biopsy tract.   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  -MRI abd showed stricturing mass at the junction of the medial and lateral left lobes. 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  -MRI abd findings are concerning for 2nd malignancy as primary CNS lymphoma rarely has disease outside the brain per heme/onc.   They requested bx of the liver mass to further guide tx and transfer to [facility] ,    but GI recommended  that there was no need for bx-    GI - recalled by primary team for biopsy   - Defer further work up and management to Neurosx/ GI/ heme/ onc   -c/w pain mx per neuro sx  - c/w depakene for seizure ppx per neuro sx  - c/w decadron per neurosx  -c/w zyprexa prn per neurosx  - heme/ onc f/u noted and appreciated  - GI f/u noted and appreciated   - PT/OT/PMR/SLP  f/u noted and appreciated- c/w following the pt     Hyponatremia  - hypochloremic hyponatremia 2/2 dehydration/ decreased po intake   -  UA noted with slightly elevated SG likely dehydration/ decreased po intake related  -  2% saline IVF @ 50ml/hr given - and d/sagrario on 1/29  as Na - improved ,       started on Na tabs 1gm BID , follow Na level daily   - goal sodium to be above 130   - if further recs needed can also consult nephro     Hyperkalemia - resolved     UTI  - UA positive - difficult to assess if pt has sx of dysuria with her current cognitive state-   - afebrile   - leukocytosis 30 - downtrending with IV abx      (but pt is also on decadron)   - f/u U cx   - c/w rocephin x3-5 days based on pts sx   - trend WBC / fever     Leukocytosis - trending down   - likely related to UTI and also pt is on decadron  - if wbc is not down trending on current tx then would recommend to see if any other source is present. In that case recommend f/u cxr and f/u B cx   - c/w trending      HTN  - bp stable  -c/w SBP goal 100-140  -c/w amlodipine 5mg po qd   -c/w labetalol or hydralzine IV prn if outside the parameters per neuro  sx     Urinary retention - still with PVR , this am 500 ml - continue doxazosin ,   consider Orozco cath     Hyperglycemia- A1C - 5.0 - no hx of DM - likely due to steroids ,   continue ISSC , accu check , if F/S remains > 200 consider ADA      DVT ppx  -c/w lovenox sq qd     d/w NS PA / nurse  Dispo: Per neuro sx-   Medicine will continue to follow for co management

## 2022-01-30 NOTE — PROGRESS NOTE ADULT - ASSESSMENT
81y Female PMH HTN, transferred from [facility] [place] after [place] and fall, also attests to feeling off for 1 month, now found with corpus callosal lesion, now s/p craniotomy for biopsy 1/19/22 now POD#11, (primary CNS lymphoma  -bx of brain mass showed DLBCL, Ki 67 80%)  postoperative course complicated with hemorrhage in surgical bed/biopsy tract    - Hem/onc consulted, recommending biopsy of hepatic lesion (MRI abd findings are concerning for 2nd malignancy as primary CNS lymphoma rarely has disease outside the brain per heme/onc.)-  - GI reconsulted Friday (1/28)  Will see over the wkend (would obtain biopsy of the liver mass to guide treatment, as regimen will be different for primary CNS lymphoma, peripheral DLBCL vs primary billary neoplasm)  - Continue  BID  - Melatonin 5 QHS  - On decadron taper unless otherwise recommended by oncology  - SBP goal 100-140; on amlodipine 5 QD  - On soft and bite sized diet; continue to follow up SLP recs  - Continue cardura 4 mg daily  - Na improved; 2% dced given rapid increase; salt tabs 1 g Q12 ordered to avoid rapid decrease  - Hospitalist consulted, appreciated  - On lovenox 40 for DVT ppx  - PT/OT/PMR/SLP following- recommending AR  - Bladder scan every 6hrs, straight cath needed. 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%)  postoperative course complicated with hemorrhage in surgical bed/biopsy tract    - Hem/onc consulted, recommending biopsy of hepatic lesion (MRI abd findings are concerning for 2nd malignancy as primary CNS lymphoma rarely has disease outside the brain per heme/onc.)-  - GI reconsulted Friday (1/28)  Will see over the wkend (would obtain biopsy of the liver mass to guide treatment, as regimen will be different for primary CNS lymphoma, peripheral DLBCL vs primary billary neoplasm)  - Continue  BID  - Melatonin 5 QHS  - On decadron taper unless otherwise recommended by oncology  - SBP goal 100-140; on amlodipine 5 QD  - On soft and bite sized diet; continue to follow up SLP recs  - Continue cardura 4 mg daily  - Na improved; 2% dced given rapid increase; salt tabs 1 g Q12 ordered to avoid rapid decrease  - Hospitalist consulted, appreciated  - PT/OT/PMR/SLP following- recommending AR  - Bladder scan every 6hrs, straight cath needed. If she continues to retain, will place ortega   - Dispo on hold, pending biopsy, possible need to transfer to [facility] for inpatient chemo

## 2022-01-30 NOTE — PROGRESS NOTE ADULT - SUBJECTIVE AND OBJECTIVE BOX
Patient seen and examined . S/p  L craniotomy , POD # 11. Very pleasant , comfortable, AAOX2 , follows commands ,   aphasia + expressive more than receptive , no complaints      CC : no complaints at this time     S/P straight cath this am - 500 ml       MEDICATIONS  (STANDING):  amLODIPine   Tablet 5 milliGRAM(s) Oral daily  cefTRIAXone   IVPB 1000 milliGRAM(s) IV Intermittent every 24 hours  dexAMETHasone     Tablet 2 milliGRAM(s) Oral every 6 hours  dexAMETHasone     Tablet   Oral   dextrose 40% Gel 15 Gram(s) Oral once  dextrose 5%. 1000 milliLiter(s) (50 mL/Hr) IV Continuous <Continuous>  dextrose 5%. 1000 milliLiter(s) (100 mL/Hr) IV Continuous <Continuous>  dextrose 50% Injectable 25 Gram(s) IV Push once  dextrose 50% Injectable 12.5 Gram(s) IV Push once  dextrose 50% Injectable 25 Gram(s) IV Push once  doxazosin 4 milliGRAM(s) Oral at bedtime  enoxaparin Injectable 40 milliGRAM(s) SubCutaneous at bedtime  glucagon  Injectable 1 milliGRAM(s) IntraMuscular once  insulin lispro (ADMELOG) corrective regimen sliding scale   SubCutaneous Before meals and at bedtime  melatonin 5 milliGRAM(s) Oral at bedtime  pantoprazole  Injectable 40 milliGRAM(s) IV Push daily  sodium chloride 1 Gram(s) Oral every 12 hours  valproic acid 250 milliGRAM(s) Oral two times a day    MEDICATIONS  (PRN):  acetaminophen     Tablet .. 650 milliGRAM(s) Oral every 6 hours PRN Mild Pain (1 - 3)  hydrALAZINE Injectable 10 milliGRAM(s) IV Push every 2 hours PRN SBP > 140  labetalol Injectable 10 milliGRAM(s) IV Push every 2 hours PRN SBP > 140  OLANZapine 2.5 milliGRAM(s) Oral every 12 hours PRN agitation  ondansetron Injectable 4 milliGRAM(s) IV Push every 6 hours PRN Nausea and/or Vomiting      LABS:                          12.0   21.79 )-----------( 276      ( 2022 10:51 )             35.9     01-30    134<L>  |  99  |  21.2<H>  ----------------------------<  135<H>  4.3   |  21.0<L>  |  0.30<L>    Ca    8.4<L>      2022 10:51  Phos  3.1       Mg     2.0         < from: CT Head No Cont (22 @ 04:15) >    ACC: [id number] EXAM:  CT BRAIN                          PROCEDURE DATE:  2022          INTERPRETATION:  Exam: CT Head Without Contrast  Exam date and time: 2022 4:09 AM  Age: 81 years old Clinical indication: Pain; Headache    TECHNIQUE: Imaging protocol: Computed tomography of the head without   contrast.    COMPARISON: CT HEAD 2022 11:54 AM    < end of copied text >  < from: CT Head No Cont (22 @ 04:15) >    IMPRESSION:  Interval disbursement of extensive left occipital parietal hemorrhage. No   new hemorrhage. Preliminary report provided by [facility] JOSE L GARCIA M.D.;[facility] RADIOLOGIST.    --- End of Report ---    < end of copied text >    Urinalysis Basic - ( 2022 16:16 )    Color: Yellow / Appearance: Slightly Turbid / S.015 / pH: x  Gluc: x / Ketone: Small  / Bili: Negative / Urobili: Negative mg/dL   Blood: x / Protein: 15 / Nitrite: Negative   Leuk Esterase: Moderate / RBC: 3-5 /HPF / WBC 11-25 /HPF   Sq Epi: x / Non Sq Epi: Occasional / Bacteria: Moderate    RADIOLOGY & ADDITIONAL TESTS:    < from: CT Head No Cont (22 @ 04:15) >    ACC: [id number] EXAM:  CT BRAIN                          PROCEDURE DATE:  2022          INTERPRETATION:  Exam: CT Head Without Contrast  Exam date and time: 2022 4:09 AM  Age: 81 years old Clinical indication: Pain; Headache    TECHNIQUE: Imaging protocol: Computed tomography of the head without   contrast.    COMPARISON: CT HEAD 2022 11:54 AM    FINDINGS:  Brain: Extensive hypodensity involving the parietal lobe on the left is   unchanged. Left occipital hemorrhage measures approximately 5.1 x 3.2 cm,   slightly more dispersed when compared to the prior examination,   accounting for differences in technique. No new hemorrhage. Subdural   collection under the patient's craniotomy defect measures 6 mm in   thickness, unchanged.Callosal mass is obscured by surrounding hemorrhage   which has dispersed when compared to the prior examination. 6 mm   left-to-right midline shift is unchanged.    Cerebral ventricles: Mass effect on the occipital horn of the left   lateral ventricle is similar to the prior examination. Small amount of   hemorrhage is seen dependently in the occipital horn of the right lateral   ventricle, unchanged.    Paranasal sinuses: Visualized sinuses are unremarkable. No fluid levels.   Mastoid air cells: Visualized mastoid air cells are well aerated.    Bones/joints: Left occipital parietal craniotomy changes are again noted.  Soft tissues: Unremarkable.    IMPRESSION:  Interval disbursement of extensive left occipital parietal hemorrhage. No   new hemorrhage. Preliminary report provided by [facility] JOSE L GARCIA M.D.;[facility] RADIOLOGIST.    --- End of Report ---    < end of copied text >        REVIEW OF SYSTEMS:    Patient AAOX2 , aphasia + , no complaints at this time     Vital Signs Last 24 Hrs  T(C): 36.7 (2022 04:18), Max: 36.7 (2022 04:18)  T(F): 98.1 (2022 04:18), Max: 98.1 (2022 04:18)  HR: 81 (2022 04:18) (81 - 105)  BP: 122/69 (2022 04:18) (122/69 - 170/77)  BP(mean): --  RR: 18 (2022 20:26) (18 - 18)  SpO2: 94% (2022 20:26) (93% - 97%)    PHYSICAL EXAM:    GENERAL: NAD, well-groomed, well-developed  HEAD: s/p L craniotomy , surgical site is clean and dry , staples+   EYES: EOMI, PERRLA, conjunctiva and sclera clear  NECK: Supple, No JVD, Normal thyroid  NERVOUS SYSTEM:  Alert & Oriented X2 ,   aphasia expressive more than receptive , follows commands   CHEST/LUNG: CTA b/l ,  no  rales, rhonchi, wheezing, or rubs  HEART: Regular rate and rhythm; No murmurs, rubs, or gallops  ABDOMEN: Soft, Nontender, Nondistended; Bowel sounds present  EXTREMITIES:  2+ Peripheral Pulses, No clubbing, cyanosis, or edema  LYMPH: No lymphadenopathy noted    Prima fit +   SKIN: No rashes or lesions

## 2022-01-30 NOTE — PROGRESS NOTE ADULT - SUBJECTIVE AND OBJECTIVE BOX
HPI: Patient is a 81y old  Female who presents with a chief complaint of brain mass.    HPI: Patient is a 82 y/o F PMH HTN transferred from [facility] [place]/p [place] & fall. Patient states she came home from work, didn't feel well and then fell and hit her head into her bathtub. Unsure if she syncopized. Denies LOC. Patient called EMS herself. Patient states she's been feeling "off" for 2 weeks but can't describe what was wrong with her. She noticed today that she felt unsteady on her feet. Currently denies headache, use of blood thinners, cancer history, visual disturbance, nausea/vomiting, fever/chills, weakness, numbness/tingling. 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       (2022 20:29)      INTERVAL OVERNIGHT EVENTS: 22 No overnight events.   81y Female  seen lying comfortably in bed.     Vital Signs Last 24 Hrs  T(C): 36.7 (2022 04:18), Max: 36.7 (2022 04:18)  T(F): 98.1 (2022 04:18), Max: 98.1 (2022 04:18)  HR: 81 (2022 04:18) (81 - 105)  BP: 122/69 (2022 04:18) (122/69 - 170/77)  BP(mean): --  RR: 18 (2022 20:26) (18 - 18)  SpO2: 94% (2022 20:26) (93% - 97%)    PHYSICAL EXAM:  GENERAL: NAD, well-groomed, well-developed  HEAD:  Atraumatic, normocephalic  WOUND: Dressing clean dry intact  MENTAL STATUS:  Awake, oriented to name, , place but not the name of the hospital , does not know the date, the year.  Conversant with expressive & receptive  aphasia. Following simple commands   CRANIAL NERVES: EOMI without nystagmus. Face symmetric w/ normal eye closure and smile. Hearing grossly intact. Speech clear. Head turning and shoulder shrug intact.   MOTOR: Moves all extremities x4, right side slightly weaker. Has a right sided neglect.   SENSATION: grossly intact to light touch all extremities      LABS:                        12.4   25.44 )-----------( 269      ( 2022 09:36 )             36.8         132<L>  |  98  |  23.2<H>  ----------------------------<  125<H>  4.4   |  23.0  |  0.33<L>    Ca    8.4<L>      2022 23:27  Phos  3.4       Mg     2.1             Urinalysis Basic - ( 2022 16:16 )    Color: Yellow / Appearance: Slightly Turbid / S.015 / pH: x  Gluc: x / Ketone: Small  / Bili: Negative / Urobili: Negative mg/dL   Blood: x / Protein: 15 / Nitrite: Negative   Leuk Esterase: Moderate / RBC: 3-5 /HPF / WBC 11-25 /HPF   Sq Epi: x / Non Sq Epi: Occasional / Bacteria: Moderate         @ 07: @ 07:00  --------------------------------------------------------  IN: 0 mL / OUT: 1420 mL / NET: -1420 mL     @ 07: @ 09:58  --------------------------------------------------------  IN: 220 mL / OUT: 0 mL / NET: 220 mL        RADIOLOGY & ADDITIONAL TESTS:    CT Head No Cont (22 @ 04:15)   Brain: Extensive hypodensity involving the parietal lobe on the left is   unchanged. Left occipital hemorrhage measures approximately 5.1 x 3.2 cm,   slightly more dispersed when compared to the prior examination,   accounting for differences in technique. No new hemorrhage. Subdural   collection under the patient's craniotomy defect measures 6 mm in   thickness, unchanged.Callosal mass is obscured by surrounding hemorrhage   which has dispersed when compared to the prior examination. 6 mm   left-to-right midline shift is unchanged.    Cerebral ventricles: Mass effect on the occipital horn of the left   lateral ventricle is similar to the prior examination. Small amount of   hemorrhage is seen dependently in the occipital horn of the right lateral   ventricle, unchanged.    Paranasal sinuses: Visualized sinuses are unremarkable. No fluid levels.   Mastoid air cells: Visualized mastoid air cells are well aerated.    Bones/joints: Left occipital parietal craniotomy changes are again noted.  Soft tissues: Unremarkable.    IMPRESSION:  Interval disbursement of extensive left occipital parietal hemorrhage. No   new hemorrhage.             CAPRINI SCORE [CLOT]:  Patient has an estimated Caprini score of greater than 5.  However, the patient's unique clinical situation will be addressed in an individual manner to determine appropriate anticoagulation treatment, if any.

## 2022-01-31 NOTE — PROGRESS NOTE ADULT - SUBJECTIVE AND OBJECTIVE BOX
Patient seen and examined . S/p L craniotomy   , POD # 12. Awake , alert , oriented to self , hospital and year , not to month and name of hospital . Denies   sob, cp , denies n/v , Orozco cath placed due to urinary retention , not sure when was last Bm     CC : has no complaints         MEDICATIONS  (STANDING):  amLODIPine   Tablet 5 milliGRAM(s) Oral daily  dexAMETHasone     Tablet   Oral   dexAMETHasone     Tablet 2 milliGRAM(s) Oral every 6 hours  dextrose 40% Gel 15 Gram(s) Oral once  dextrose 5%. 1000 milliLiter(s) (50 mL/Hr) IV Continuous <Continuous>  dextrose 5%. 1000 milliLiter(s) (100 mL/Hr) IV Continuous <Continuous>  dextrose 50% Injectable 25 Gram(s) IV Push once  dextrose 50% Injectable 12.5 Gram(s) IV Push once  dextrose 50% Injectable 25 Gram(s) IV Push once  doxazosin 4 milliGRAM(s) Oral at bedtime  glucagon  Injectable 1 milliGRAM(s) IntraMuscular once  insulin lispro (ADMELOG) corrective regimen sliding scale   SubCutaneous Before meals and at bedtime  melatonin 5 milliGRAM(s) Oral at bedtime  pantoprazole  Injectable 40 milliGRAM(s) IV Push daily  sodium chloride 1 Gram(s) Oral every 12 hours  valproic acid 250 milliGRAM(s) Oral two times a day    MEDICATIONS  (PRN):  acetaminophen     Tablet .. 650 milliGRAM(s) Oral every 6 hours PRN Mild Pain (1 - 3)  hydrALAZINE Injectable 10 milliGRAM(s) IV Push every 2 hours PRN SBP > 140  labetalol Injectable 10 milliGRAM(s) IV Push every 2 hours PRN SBP > 140  OLANZapine 2.5 milliGRAM(s) Oral every 12 hours PRN agitation  ondansetron Injectable 4 milliGRAM(s) IV Push every 6 hours PRN Nausea and/or Vomiting      LABS:                          12.0   21.79 )-----------( 276      ( 30 Jan 2022 10:51 )             35.9     01-30    134<L>  |  99  |  21.2<H>  ----------------------------<  135<H>  4.3   |  21.0<L>  |  0.30<L>    Ca    8.4<L>      30 Jan 2022 10:51  Phos  3.1     01-30  Mg     2.0     01-30      PT/INR - ( 31 Jan 2022 06:54 )   PT: 11.2 sec;   INR: 0.96 ratio      COVID-19 PCR: NotDetec:       RADIOLOGY & ADDITIONAL TESTS:    < from: CT Head No Cont (01.29.22 @ 04:15) >    ACC: [id number] EXAM:  CT BRAIN                          PROCEDURE DATE:  01/29/2022          INTERPRETATION:  Exam: CT Head Without Contrast  Exam date and time: 1/29/2022 4:09 AM  Age: 81 years old Clinical indication: Pain; Headache    TECHNIQUE: Imaging protocol: Computed tomography of the head without   contrast.    COMPARISON: CT HEAD 1/22/2022 11:54 AM    < end of copied text >          REVIEW OF SYSTEMS:    All systems reviewed and are negative     Vital Signs Last 24 Hrs  T(C): 36.9 (31 Jan 2022 04:47), Max: 36.9 (31 Jan 2022 04:47)  T(F): 98.4 (31 Jan 2022 04:47), Max: 98.4 (31 Jan 2022 04:47)  HR: 76 (31 Jan 2022 04:47) (76 - 82)  BP: 121/75 (31 Jan 2022 04:47) (121/75 - 129/75)  BP(mean): --  RR: 18 (31 Jan 2022 04:47) (18 - 18)  SpO2: 95% (31 Jan 2022 04:47) (93% - 96%)  PHYSICAL EXAM:    GENERAL: NAD, well-groomed, well-developed  HEAD: s/p L craniotomy , surgical site clean and dry   EYES: EOMI, PERRLA, conjunctiva and sclera clear  NECK: Supple, No JVD, Normal thyroid  NERVOUS SYSTEM: Awake , alert to self/hospital / year -   not to month and hospital name , follows commands ,   motor 5/5 in all 4 ext  CHEST/LUNG: CTA b/l ,  no  rales, rhonchi, wheezing, or rubs  HEART: Regular rate and rhythm; No murmurs, rubs, or gallops  ABDOMEN: Soft, Nontender, Nondistended; Bowel sounds present  EXTREMITIES:  2+ Peripheral Pulses, No clubbing, cyanosis, or edema,   b/l LE with ecchymosis   LYMPH: No lymphadenopathy noted  SKIN: No rashes or lesions  Orozco cath+    Patient seen and examined . S/p L craniotomy   , POD # 12. Awake , alert , oriented to self , hospital and year , not to month and name of hospital . Denies   sob, cp , denies n/v , Orozco cath placed due to urinary retention , not sure when was last Bm     CC : has no complaints     Cardiac monitor noted with 2 short runs od NSVT  3 beats this am -   patient asymptomatic         MEDICATIONS  (STANDING):  amLODIPine   Tablet 5 milliGRAM(s) Oral daily  dexAMETHasone     Tablet   Oral   dexAMETHasone     Tablet 2 milliGRAM(s) Oral every 6 hours  dextrose 40% Gel 15 Gram(s) Oral once  dextrose 5%. 1000 milliLiter(s) (50 mL/Hr) IV Continuous <Continuous>  dextrose 5%. 1000 milliLiter(s) (100 mL/Hr) IV Continuous <Continuous>  dextrose 50% Injectable 25 Gram(s) IV Push once  dextrose 50% Injectable 12.5 Gram(s) IV Push once  dextrose 50% Injectable 25 Gram(s) IV Push once  doxazosin 4 milliGRAM(s) Oral at bedtime  glucagon  Injectable 1 milliGRAM(s) IntraMuscular once  insulin lispro (ADMELOG) corrective regimen sliding scale   SubCutaneous Before meals and at bedtime  melatonin 5 milliGRAM(s) Oral at bedtime  pantoprazole  Injectable 40 milliGRAM(s) IV Push daily  sodium chloride 1 Gram(s) Oral every 12 hours  valproic acid 250 milliGRAM(s) Oral two times a day    MEDICATIONS  (PRN):  acetaminophen     Tablet .. 650 milliGRAM(s) Oral every 6 hours PRN Mild Pain (1 - 3)  hydrALAZINE Injectable 10 milliGRAM(s) IV Push every 2 hours PRN SBP > 140  labetalol Injectable 10 milliGRAM(s) IV Push every 2 hours PRN SBP > 140  OLANZapine 2.5 milliGRAM(s) Oral every 12 hours PRN agitation  ondansetron Injectable 4 milliGRAM(s) IV Push every 6 hours PRN Nausea and/or Vomiting      LABS:                          12.0   21.79 )-----------( 276      ( 30 Jan 2022 10:51 )             35.9     01-30    134<L>  |  99  |  21.2<H>  ----------------------------<  135<H>  4.3   |  21.0<L>  |  0.30<L>    Ca    8.4<L>      30 Jan 2022 10:51  Phos  3.1     01-30  Mg     2.0     01-30      PT/INR - ( 31 Jan 2022 06:54 )   PT: 11.2 sec;   INR: 0.96 ratio      COVID-19 PCR: NotDetec:       RADIOLOGY & ADDITIONAL TESTS:    < from: CT Head No Cont (01.29.22 @ 04:15) >    ACC: [id number] EXAM:  CT BRAIN                          PROCEDURE DATE:  01/29/2022          INTERPRETATION:  Exam: CT Head Without Contrast  Exam date and time: 1/29/2022 4:09 AM  Age: 81 years old Clinical indication: Pain; Headache    TECHNIQUE: Imaging protocol: Computed tomography of the head without   contrast.    COMPARISON: CT HEAD 1/22/2022 11:54 AM    < end of copied text >    < from: CT Chest w/ IV Cont (01.14.22 @ 07:03) >    ACC: [id number] EXAM:  CT ABDOMEN AND PELVIS IC                        ACC: [id number] EXAM:  CT CHEST IC                          PROCEDURE DATE:  01/14/2022          INTERPRETATION:  CLINICAL INFORMATION: Brain mass.    COMPARISON: None.    CONTRAST/COMPLICATIONS:  IV Contrast: Omnipaque  95 cc administered   5 cc discarded  Oral Contrast: NONE  Complications: None reported at time of study completion    < end of copied text >                REVIEW OF SYSTEMS:    All systems reviewed and are negative     Vital Signs Last 24 Hrs  T(C): 36.9 (31 Jan 2022 04:47), Max: 36.9 (31 Jan 2022 04:47)  T(F): 98.4 (31 Jan 2022 04:47), Max: 98.4 (31 Jan 2022 04:47)  HR: 76 (31 Jan 2022 04:47) (76 - 82)  BP: 121/75 (31 Jan 2022 04:47) (121/75 - 129/75)  BP(mean): --  RR: 18 (31 Jan 2022 04:47) (18 - 18)  SpO2: 95% (31 Jan 2022 04:47) (93% - 96%)  PHYSICAL EXAM:    GENERAL: NAD, well-groomed, well-developed  HEAD: s/p L craniotomy , surgical site clean and dry   EYES: EOMI, PERRLA, conjunctiva and sclera clear  NECK: Supple, No JVD, Normal thyroid  NERVOUS SYSTEM: Awake , alert to self/hospital / year -   not to month and hospital name , follows commands ,   motor 5/5 in all 4 ext  CHEST/LUNG: CTA b/l ,  no  rales, rhonchi, wheezing, or rubs  HEART: Regular rate and rhythm; No murmurs, rubs, or gallops  ABDOMEN: Soft, Nontender, Nondistended; Bowel sounds present  EXTREMITIES:  2+ Peripheral Pulses, No clubbing, cyanosis, or edema,   b/l LE with ecchymosis   LYMPH: No lymphadenopathy noted  SKIN: No rashes or lesions  Orozco cath+

## 2022-01-31 NOTE — PROGRESS NOTE ADULT - ASSESSMENT
81 y.o. Female with hx of HTN, transferred from [facility] [place] after s/p mechanical fall, with ongoing sx of feeling unwell x 1 month, admitted to the NEURO sx service with corpus callosal lesion, now s/p craniotomy for biopsy 1/19/22, postoperative course complicated with hemorrhage in surgical bed/biopsy tract. Also noted on ct/ MRI AP showed mild MS LADs, and left hepatic mass. Imaging showed Region of low attenuation in the central aspect of the left hepatic lobe measuring approximately 2.8 cm with mild biliary ductal dilatation in the left hepatic lobe and left hepatic lobe atrophy. Plan for EUS guided biopsy tomorrow. NPO after midnight. Hold Lovenox, am labs, covid swab.    Plan discussed with NP

## 2022-01-31 NOTE — PROGRESS NOTE ADULT - ASSESSMENT
81 y.o. Female with hx of HTN, transferred from [facility] [place] after s/p mechanical fall, with ongoing sx of feeling unwell x 1 month, admitted to the NEURO sx service with corpus callosal lesion, now s/p craniotomy for biopsy 1/19/22 now POD#10, postoperative course complicated with hemorrhage in surgical bed/biopsy tract. Also noted on ct/ MRI AP showed mild MS LADs, and left hepatic mass. MRI abd showed left hepatic lobe atrophy and mild intrahepatic dilatation involving segments 2 and 3. Suspicion for stricturing mass at the junction of the medial and lateral left lobes. GI and heme/onc continue to follow the pt for next steps. Medicine consulted for co management as pt with noted electrolyte abnormalities, up trending leukocytosis and UTI.       Primary CNS lymphoma  -s/p bx with post op complication with hemorrhage in surgical bed/biopsy tract.   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  -MRI abd showed stricturing mass at the junction of the medial and lateral left lobes. 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  -MRI abd findings are concerning for 2nd malignancy as primary CNS lymphoma rarely has disease outside the brain per heme/onc.  - GI - recalled by primary team for biopsy , biopsy planned to be done by IR likely in am   - Defer further work up and management to Neurosx/ GI/ heme/ onc   -c/w pain mx per neuro sx  - c/w depakene for seizure ppx per neuro sx  - c/w decadron per neurosx  -c/w zyprexa prn per neurosx        Hyponatremia  - hypochloremic hyponatremia 2/2 dehydration/ decreased po intake   - Na 130 --> 134 on 1/30   -  2% saline IVF @ 50ml/hr given - and d/sagrario on 1/29  as Na - improved ,       started on Na tabs 1gm BID , follow Na level daily   - goal sodium to be above 130   - if further recs needed can also consult nephro     Hyperkalemia - resolved     UTI  - UA positive - difficult to assess if pt has sx of dysuria with her current cognitive state-   - afebrile   - leukocytosis 30 - downtrending with IV abx - down to 21 on 1/30      (but pt is also on decadron)   - f/u U cx   - c/w rocephin x3-5 days , may change base to sensitivity   - Orozco placed for urinary retention   - trend WBC / fever     Leukocytosis - trending down   - likely related to UTI and also pt is on decadron  - c/w trending      HTN  - bp stable  -c/w SBP goal 100-140  -c/w amlodipine 5mg po qd   -c/w labetalol or hydralzine IV prn if outside the parameters per neuro  sx     Urinary retention - Orozco placed on 1/30 - continue doxazosin ,    Hyperglycemia- A1C - 5.0 - no hx of DM - likely due to steroids ,   continue ISSC , accu check , if F/S remains > 200 consider ADA      DVT ppx  -c/w lovenox sq qd - on hold for planned IR biopsy     Unknown last BM -   will add Miralax / Senna - hold for loose stools     Medicine will not follow daily , please call if any active issue .     81 y.o. Female with hx of HTN, [place] [facility] [place], with ongoing sx of feeling unwell x 1 month, admitted to the NEURO sx service with corpus callosal lesion, now s/p craniotomy for biopsy 1/19/22 now POD#10, postoperative course complicated with hemorrhage in surgical bed/biopsy tract. Also noted on ct/ MRI AP showed mild MS LADs, and left hepatic mass. MRI abd showed left hepatic lobe atrophy and mild intrahepatic dilatation involving segments 2 and 3. Suspicion for stricturing mass at the junction of the medial and lateral left lobes. GI and heme/onc continue to follow the pt for next steps. Medicine consulted for co management as pt with noted electrolyte abnormalities, up trending leukocytosis and UTI.       Primary CNS lymphoma  -s/p bx with post op complication with hemorrhage in surgical bed/biopsy tract.   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  -MRI abd showed stricturing mass at the junction of the medial and lateral left lobes. 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  -MRI abd findings are concerning for 2nd malignancy as primary CNS lymphoma rarely has disease outside the brain per heme/onc.  - GI - recalled by primary team for biopsy , biopsy planned to be done by IR likely in am   - Defer further work up and management to Neurosx/ GI/ heme/ onc   -c/w pain mx per neuro sx  - c/w depakene for seizure ppx per neuro sx  - c/w decadron per neurosx  -c/w zyprexa prn per neurosx        Hyponatremia  - hypochloremic hyponatremia 2/2 dehydration/ decreased po intake   - Na 130 --> 134 on 1/30   -  2% saline IVF @ 50ml/hr given - and d/sagrario on 1/29  as Na - improved ,       started on Na tabs 1gm BID , follow Na level daily   - goal sodium to be above 130   - if further recs needed can also consult nephro     Hyperkalemia - resolved     UTI  - UA positive - difficult to assess if pt has sx of dysuria with her current cognitive state-   - afebrile   - leukocytosis 30 - downtrending with IV abx - down to 21 on 1/30      (but pt is also on decadron)   - f/u U cx   - c/w rocephin x3-5 days , may change base to sensitivity   - Orozco placed for urinary retention   - trend WBC / fever     Leukocytosis - trending down   - likely related to UTI and also pt is on decadron  - c/w trending      HTN  - bp stable  -c/w SBP goal 100-140  -c/w amlodipine 5mg po qd   -c/w labetalol or hydralzine IV prn if outside the parameters per neuro  sx     Urinary retention - Orozco placed on 1/30 - continue doxazosin ,    Hyperglycemia- A1C - 5.0 - no hx of DM - likely due to steroids ,   continue ISSC , accu check , if F/S remains > 200 consider ADA      DVT ppx  -c/w lovenox sq qd - on hold for planned IR biopsy     NSVT - 2 episodes of 3 beets noted on cardiac monitor this  am ,   will d/c Amlodipine , start on Metoprolol 25 mg BID   - get ECHO , check Mg/ K+ and keep Mg> 2 and potasium > 4     Unknown last BM -   will add Miralax / Senna - hold for loose stools     Labs/ last EKG on 1/22/22 / CT head / chest - noted and reviewed   patient denies any sob / cp - no hx of CAD/CHF/CKD -   short runs NSVT this am - patient asymptomatic ,   will get ECHO , check Mg/ K- supplement to keep Mg>2, K > 4 ,   no need to wait for ECHO results for biopsy - patient is asymptomatic ,   no absolute contraindications for planned IR biopsy .   d/w NS PA .   Medicine will follow .

## 2022-01-31 NOTE — DIETITIAN NUTRITION RISK NOTIFICATION - ADDITIONAL COMMENTS/DIETITIAN RECOMMENDATIONS
1) Continue current diet order   2) Continue Ensure Enlive TID   3) Continue to provide encouragement/assistance as needed during mealtimes to inc PO   4) Encourage HBV protein sources   5) Monitor weights daily for trend/accuracy

## 2022-01-31 NOTE — PROGRESS NOTE ADULT - SUBJECTIVE AND OBJECTIVE BOX
Patient is a 81y old  Female who presents with a chief complaint of Brain mass (31 Jan 2022 11:01)      SUBJECTIVE / OVERNIGHT EVENTS: Patient seen and examined, Confused.   Plan for LIver biopsy tomorrow       MEDICATIONS  (STANDING):  dexAMETHasone     Tablet   Oral   dexAMETHasone     Tablet 2 milliGRAM(s) Oral every 6 hours  dextrose 40% Gel 15 Gram(s) Oral once  dextrose 5%. 1000 milliLiter(s) (50 mL/Hr) IV Continuous <Continuous>  dextrose 5%. 1000 milliLiter(s) (100 mL/Hr) IV Continuous <Continuous>  dextrose 50% Injectable 25 Gram(s) IV Push once  dextrose 50% Injectable 12.5 Gram(s) IV Push once  dextrose 50% Injectable 25 Gram(s) IV Push once  doxazosin 4 milliGRAM(s) Oral at bedtime  glucagon  Injectable 1 milliGRAM(s) IntraMuscular once  insulin lispro (ADMELOG) corrective regimen sliding scale   SubCutaneous Before meals and at bedtime  melatonin 5 milliGRAM(s) Oral at bedtime  metoprolol tartrate 25 milliGRAM(s) Oral two times a day  pantoprazole  Injectable 40 milliGRAM(s) IV Push daily  polyethylene glycol 3350 17 Gram(s) Oral daily  sodium chloride 1 Gram(s) Oral every 12 hours  valproic acid 250 milliGRAM(s) Oral two times a day    MEDICATIONS  (PRN):  acetaminophen     Tablet .. 650 milliGRAM(s) Oral every 6 hours PRN Mild Pain (1 - 3)  hydrALAZINE Injectable 10 milliGRAM(s) IV Push every 2 hours PRN SBP > 140  labetalol Injectable 10 milliGRAM(s) IV Push every 2 hours PRN SBP > 140  OLANZapine 2.5 milliGRAM(s) Oral every 12 hours PRN agitation  ondansetron Injectable 4 milliGRAM(s) IV Push every 6 hours PRN Nausea and/or Vomiting    CAPILLARY BLOOD GLUCOSE      POCT Blood Glucose.: 151 mg/dL (31 Jan 2022 10:50)  POCT Blood Glucose.: 113 mg/dL (31 Jan 2022 08:04)  POCT Blood Glucose.: 102 mg/dL (30 Jan 2022 23:05)  POCT Blood Glucose.: 117 mg/dL (30 Jan 2022 16:54)  POCT Blood Glucose.: 201 mg/dL (30 Jan 2022 11:32)    I&O's Summary    30 Jan 2022 07:01  -  31 Jan 2022 07:00  --------------------------------------------------------  IN: 540 mL / OUT: 1000 mL / NET: -460 mL        PHYSICAL EXAM:  Vital Signs Last 24 Hrs  T(C): 36.9 (31 Jan 2022 04:47), Max: 36.9 (31 Jan 2022 04:47)  T(F): 98.4 (31 Jan 2022 04:47), Max: 98.4 (31 Jan 2022 04:47)  HR: 93 (31 Jan 2022 09:00) (76 - 93)  BP: 138/75 (31 Jan 2022 09:00) (121/75 - 138/75)  BP(mean): --  RR: 18 (31 Jan 2022 04:47) (18 - 18)  SpO2: 95% (31 Jan 2022 04:47) (93% - 96%)    CONSTITUTIONAL: NAD, well-developed, well-groomed  ENMT: Moist oral mucosa, no pharyngeal injection or exudates; normal dentition  RESPIRATORY: Normal respiratory effort; lungs are clear to auscultation bilaterally  CARDIOVASCULAR: Regular rate and rhythm, normal S1 and S2, no murmur/rub/gallop; No lower extremity edema; Peripheral pulses are 2+ bilaterally  ABDOMEN: Nontender to palpation, normoactive bowel sounds, no rebound/guarding; No hepatosplenomegaly  PSYCH: confused  SKIN: No rashes; no palpable lesions    LABS:                        12.0   21.79 )-----------( 276      ( 30 Jan 2022 10:51 )             35.9     01-31    131<L>  |  97<L>  |  20.4<H>  ----------------------------<  117<H>  4.9   |  25.0  |  0.31<L>    Ca    8.2<L>      31 Jan 2022 10:22  Phos  3.1     01-30  Mg     2.1     01-31      PT/INR - ( 31 Jan 2022 06:54 )   PT: 11.2 sec;   INR: 0.96 ratio                   COVID-19 PCR: NotDetec (30 Jan 2022 17:15)  COVID-19 PCR: NotDetec (26 Jan 2022 07:25)  COVID-19 PCR: NotDetec (18 Jan 2022 04:49)  COVID-19 PCR: NotDetec (13 Jan 2022 20:10)      RADIOLOGY & ADDITIONAL TESTS:  Imaging from Last 24 Hours:    Electrocardiogram/QTc Interval:    COORDINATION OF CARE:  Care Discussed with Consultants/Other Providers:

## 2022-01-31 NOTE — PROGRESS NOTE ADULT - SUBJECTIVE AND OBJECTIVE BOX
Patient seen this morning. No complaints at this time. Awaiting potential oncology treatment plans.    REVIEW OF SYSTEMS  Constitutional - No fever,  No fatigue  HEENT - No vertigo, No neck pain  Neurological - No headaches, + memory loss, No loss of strength, No numbness, No tremors  Musculoskeletal - No joint pain, No joint swelling, No muscle pain  Psychiatric - No depression, No anxiety  All other systems were reviewed and were negative.    FUNCTIONAL PROGRESS  1/26 ST  Speech Language Pathology Recommendations: 1. Soft & bite-sized solids, w/thin liquids2. Aspiration precautions3. Meds whole or crushed in puree4. 100% supervision for all meals due to reduced cognition5. Upright for all PO, small bites/sips, slow rate6. Oral care7. Place dentures prior to PO8. SLP to follow     VITALS  T(C): 36.9 (01-31-22 @ 04:47), Max: 36.9 (01-31-22 @ 04:47)  HR: 93 (01-31-22 @ 09:00) (76 - 93)  BP: 138/75 (01-31-22 @ 09:00) (121/75 - 138/75)  RR: 18 (01-31-22 @ 04:47) (18 - 18)  SpO2: 95% (01-31-22 @ 04:47) (93% - 96%)  Wt(kg): --    MEDICATIONS   acetaminophen     Tablet .. 650 milliGRAM(s) every 6 hours PRN  dexAMETHasone     Tablet     dexAMETHasone     Tablet 2 milliGRAM(s) every 6 hours  dextrose 40% Gel 15 Gram(s) once  dextrose 5%. 1000 milliLiter(s) <Continuous>  dextrose 5%. 1000 milliLiter(s) <Continuous>  dextrose 50% Injectable 25 Gram(s) once  dextrose 50% Injectable 12.5 Gram(s) once  dextrose 50% Injectable 25 Gram(s) once  doxazosin 4 milliGRAM(s) at bedtime  glucagon  Injectable 1 milliGRAM(s) once  hydrALAZINE Injectable 10 milliGRAM(s) every 2 hours PRN  insulin lispro (ADMELOG) corrective regimen sliding scale   Before meals and at bedtime  labetalol Injectable 10 milliGRAM(s) every 2 hours PRN  melatonin 5 milliGRAM(s) at bedtime  metoprolol tartrate 25 milliGRAM(s) two times a day  OLANZapine 2.5 milliGRAM(s) every 12 hours PRN  ondansetron Injectable 4 milliGRAM(s) every 6 hours PRN  pantoprazole  Injectable 40 milliGRAM(s) daily  polyethylene glycol 3350 17 Gram(s) daily  sodium chloride 1 Gram(s) every 12 hours  valproic acid 250 milliGRAM(s) two times a day      RECENT LABS/IMAGING                          12.0   21.79 )-----------( 276      ( 30 Jan 2022 10:51 )             35.9     01-31    131<L>  |  97<L>  |  20.4<H>  ----------------------------<  117<H>  4.9   |  25.0  |  0.31<L>    Ca    8.2<L>      31 Jan 2022 10:22  Phos  3.1     01-30  Mg     2.1     01-31      PT/INR - ( 31 Jan 2022 06:54 )   PT: 11.2 sec;   INR: 0.96 ratio         MRI HEAD 1/14 - 1)  in addition to the corpus callosal infiltrative lesion, there are approximately 5 other lesions with associated edema and enhancement. Vasogenic edema is most extensive in the left parietal region. See full description above. Differential considerations, therefore, include the possibility of metastatic disease versus lymphoma rather than glioblastoma. Further assessment and correlation recommended.2)  no evidence of arterial or venous occlusive disease.    MRI ABD 1/14 - Left hepatic lobe atrophy and mild intrahepatic dilatation involving segments 2 and 3. Suspicion for stricturing mass at the junction of the medial and lateral left lobes. 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    MR Brain Stereotactic w/ IV Cont 1/18: 5.2 cm TRV x 3.3 cm AP x 3.1 cm CC solid enhancing lobulated mass in the posterior body of the corpus callosum. Moderate vasogenic edema is noted. A 1 cm enhancing lesion is seen in the LEFT parietal cortex and several subcentimeter enhancing lesions are seen in the bifrontal subcortical white matter.  FINDINGS suspicious for primary CNS lymphoma.    CT Head 1/19: 3.4 x 5.1 cm acute hemorrhage along the biopsy tract in the LEFT parietal lobe extending to the mass within the posterior corpus callosum. Pneumocephalus noted along the biopsy tract as well as within the mass. Overlying RIGHT parietal LEFT parietal craniotomy is noted.    CT Head 1/19: No change since 2:30 PM. Left parietal parenchymal hemorrhage along the biopsy tract to the mass in the splenium of the corpus callosum.    CT Head 1/22: STABLE HEMORRHAGE LEFT PARIETAL LOBE ALONG A BIOPSY TRACT LEADING TO A 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    CT Head 1/29: Interval disbursement of extensive left occipital parietal hemorrhage. No new hemorrhage.     ----------------------------------------------------------------------------------------  PHYSICAL EXAM  Constitutional - NAD, Comfortable  Extremities - No C/C/E, No calf tenderness   Neurologic Exam -                    Cognitive - AAO to self, situation      Communication - Fluent      Cranial Nerves - Left facial droop     Motor - No focal deficits  Psychiatric - Calm  ----------------------------------------------------------------------------------------  ASSESSMENT/PLAN  81yFemale with functional deficits after 2 weeks of feeling off, now found to have a brain mass and IPH post-biopsy  Brain Mass - Depakote, Decadron taper, possible chemo treatments in the future  Liver Mass - pending biopsy for analysis  IPH - Stable  Sleep - Melatonin 5mg HS (1/24)  HTN - Amlodipine, Cardura, Hydralazine, Labetalol  Pain - Tylenol, Oxycodone  Confusion - Zyprexa PRN  Oropharyngeal Dysphagia - Soft and bite sized/thins  DVT PPX - SCDs, Lovenox  Rehab - Recommend ACUTE inpatient rehabilitation for the functional deficits consisting of 3 hours of therapy/day & 24 hour RN/daily PMR physician for comorbid medical management. Will continue to follow for ongoing rehab needs and recommendations. Patient will be able to tolerate 3 hours a day.    Continue bedside therapy as well as OOB throughout the day with mobilization throughout the day with staff to maintain cardiopulmonary function and prevention of secondary complications related to debility.     Discussed with rehab clinical team.

## 2022-01-31 NOTE — PROGRESS NOTE ADULT - SUBJECTIVE AND OBJECTIVE BOX
HPI: Patient is a 82 y/o F PMH HTN transferred from [facility] [place] [place] s/p trip & fall. Patient states she came home from work, didn't feel well and then fell and hit her head into her bathtub. Unsure if she syncopized. Denies LOC. Patient called EMS herself. Patient states she's been feeling "off" for 2 weeks but can't describe what was wrong with her. She noticed today that she felt unsteady on her feet. Currently denies headache, use of blood thinners, cancer history, visual disturbance, nausea/vomiting, fever/chills, weakness, numbness/tingling. 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       (13 Jan 2022 20:29)      INTERVAL OVERNIGHT EVENTS: 1/31/22   81y Female seen lying in bed. Orozco placed yesterday for urinary retention. IR biopsy planned for this morning has been rescheduled to tomorrow 2/2/22. Dr Espinosa noticed 2 runs, 3 beats of NSVT.     Vital Signs Last 24 Hrs  T(C): 36.9 (31 Jan 2022 04:47), Max: 36.9 (31 Jan 2022 04:47)  T(F): 98.4 (31 Jan 2022 04:47), Max: 98.4 (31 Jan 2022 04:47)  HR: 93 (31 Jan 2022 09:00) (76 - 93)  BP: 138/75 (31 Jan 2022 09:00) (121/75 - 138/75)  BP(mean): --  RR: 18 (31 Jan 2022 04:47) (18 - 18)  SpO2: 95% (31 Jan 2022 04:47) (93% - 96%)    PHYSICAL EXAM:  GENERAL: NAD, well-groomed, well-developed  HEAD:  Atraumatic, normocephalic  WOUND: Dressing clean dry intact  MENTAL STATUS: AAO x2, Awake, conversant with expressive & receptive aphasia. Following simple commands  CRANIAL NERVES:  EOMI without nystagmus. Facial sensation intact V1-3 distribution b/l. Face symmetric w/ normal eye closure and smile, tongue midline. Hearing grossly intact. Speech clear. Head turning and shoulder shrug intact.   MOTOR: MORROW x4 spontaneous & with good effort  SENSATION: grossly intact to light touch all extremities      LABS:                        12.0   21.79 )-----------( 276      ( 30 Jan 2022 10:51 )             35.9     01-31    131<L>  |  97<L>  |  20.4<H>  ----------------------------<  117<H>  4.9   |  25.0  |  0.31<L>    Ca    8.2<L>      31 Jan 2022 10:22  Phos  3.1     01-30  Mg     2.1     01-31      PT/INR - ( 31 Jan 2022 06:54 )   PT: 11.2 sec;   INR: 0.96 ratio               01-30 @ 07:01  -  01-31 @ 07:00  --------------------------------------------------------  IN: 540 mL / OUT: 1000 mL / NET: -460 mL        RADIOLOGY & ADDITIONAL TESTS:    CT Head No Cont (01.29.22 @ 04:15)     Brain: Extensive hypodensity involving the parietal lobe on the left is   unchanged. Left occipital hemorrhage measures approximately 5.1 x 3.2 cm,   slightly more dispersed when compared to the prior examination,   accounting for differences in technique. No new hemorrhage. Subdural   collection under the patient's craniotomy defect measures 6 mm in   thickness, unchanged.Callosal mass is obscured by surrounding hemorrhage   which has dispersed when compared to the prior examination. 6 mm   left-to-right midline shift is unchanged.    Cerebral ventricles: Mass effect on the occipital horn of the left   lateral ventricle is similar to the prior examination. Small amount of   hemorrhage is seen dependently in the occipital horn of the right lateral   ventricle, unchanged.    Paranasal sinuses: Visualized sinuses are unremarkable. No fluid levels.   Mastoid air cells: Visualized mastoid air cells are well aerated.    Bones/joints: Left occipital parietal craniotomy changes are again noted.  Soft tissues: Unremarkable.    IMPRESSION:  Interval disbursement of extensive left occipital parietal hemorrhage. No   new hemorrhage.        CAPRINI SCORE [CLOT]:  Patient has an estimated Caprini score of greater than 5.  However, the patient's unique clinical situation will be addressed in an individual manner to determine appropriate anticoagulation treatment, if any.

## 2022-01-31 NOTE — PROGRESS NOTE ADULT - SUBJECTIVE AND OBJECTIVE BOX
Pt seen and examined at bedside.    PERTINENT REVIEW OF SYSTEMS:  CONSTITUTIONAL: No weakness, fevers or chills  HEENT: No visual changes; No vertigo or throat pain   GASTROINTESTINAL: No abdominal or epigastric pain. No nausea, vomiting, or hematemesis; No diarrhea or constipation. No melena or hematochezia.  NEUROLOGICAL: No numbness or weakness  SKIN: No itching, burning, rashes, or lesions     Allergies    Bananas (Angioedema; Anaphylaxis; Short breath)  Kiwi (Angioedema; Anaphylaxis; Short breath)  latex (Unknown)  Rock (Angioedema; Anaphylaxis; Short breath)  No Known Drug Allergies  strawberry (Angioedema; Anaphylaxis; Short breath)    Intolerances      MEDICATIONS:  MEDICATIONS  (STANDING):  dexAMETHasone     Tablet   Oral   dexAMETHasone     Tablet 2 milliGRAM(s) Oral every 6 hours  dextrose 40% Gel 15 Gram(s) Oral once  dextrose 5%. 1000 milliLiter(s) (50 mL/Hr) IV Continuous <Continuous>  dextrose 5%. 1000 milliLiter(s) (100 mL/Hr) IV Continuous <Continuous>  dextrose 50% Injectable 25 Gram(s) IV Push once  dextrose 50% Injectable 12.5 Gram(s) IV Push once  dextrose 50% Injectable 25 Gram(s) IV Push once  doxazosin 4 milliGRAM(s) Oral at bedtime  glucagon  Injectable 1 milliGRAM(s) IntraMuscular once  insulin lispro (ADMELOG) corrective regimen sliding scale   SubCutaneous Before meals and at bedtime  melatonin 5 milliGRAM(s) Oral at bedtime  metoprolol tartrate 25 milliGRAM(s) Oral two times a day  pantoprazole  Injectable 40 milliGRAM(s) IV Push daily  polyethylene glycol 3350 17 Gram(s) Oral daily  sodium chloride 1 Gram(s) Oral every 12 hours  valproic acid 250 milliGRAM(s) Oral two times a day    MEDICATIONS  (PRN):  acetaminophen     Tablet .. 650 milliGRAM(s) Oral every 6 hours PRN Mild Pain (1 - 3)  hydrALAZINE Injectable 10 milliGRAM(s) IV Push every 2 hours PRN SBP > 140  labetalol Injectable 10 milliGRAM(s) IV Push every 2 hours PRN SBP > 140  OLANZapine 2.5 milliGRAM(s) Oral every 12 hours PRN agitation  ondansetron Injectable 4 milliGRAM(s) IV Push every 6 hours PRN Nausea and/or Vomiting    Vital Signs Last 24 Hrs  T(C): 36.9 (31 Jan 2022 04:47), Max: 36.9 (31 Jan 2022 04:47)  T(F): 98.4 (31 Jan 2022 04:47), Max: 98.4 (31 Jan 2022 04:47)  HR: 93 (31 Jan 2022 09:00) (76 - 93)  BP: 138/75 (31 Jan 2022 09:00) (121/75 - 138/75)  BP(mean): --  RR: 18 (31 Jan 2022 04:47) (18 - 18)  SpO2: 95% (31 Jan 2022 04:47) (93% - 95%)    01-30 @ 07:01  -  01-31 @ 07:00  --------------------------------------------------------  IN: 540 mL / OUT: 1000 mL / NET: -460 mL      PHYSICAL EXAM:    General: Well developed; well nourished; in no acute distress  HEENT: MMM, conjunctiva and sclera clear  Gastrointestinal: Soft non-tender non-distended; Normal bowel sounds; No hepatosplenomegaly. No rebound or guarding  Skin: Warm and dry. No obvious rash    LABS:                        12.0   21.79 )-----------( 276      ( 30 Jan 2022 10:51 )             35.9     01-31    131<L>  |  97<L>  |  20.4<H>  ----------------------------<  117<H>  4.9   |  25.0  |  0.31<L>    Ca    8.2<L>      31 Jan 2022 10:22  Phos  3.1     01-30  Mg     2.1     01-31      PT/INR - ( 31 Jan 2022 06:54 )   PT: 11.2 sec;   INR: 0.96 ratio                           Culture - Urine (collected 29 Jan 2022 09:49)  Source: Catheterized Catheterized  Preliminary Report (31 Jan 2022 11:46):    >100,000 CFU/ml Gram Negative Rods      RADIOLOGY & ADDITIONAL STUDIES:

## 2022-01-31 NOTE — DIETITIAN NUTRITION RISK NOTIFICATION - TREATMENT: THE FOLLOWING DIET HAS BEEN RECOMMENDED
Diet, NPO after Midnight:      NPO Start Date: 31-Jan-2022,   NPO Start Time: 23:59 (01-31-22 @ 08:36) [Active]  Diet, Regular:   Soft and Bite Sized (SOFTBTSZ)  Supplement Feeding Modality:  Oral  Ensure Enlive Cans or Servings Per Day:  1       Frequency:  Three Times a day (01-27-22 @ 17:00) [Active]

## 2022-01-31 NOTE — CHART NOTE - NSCHARTNOTEFT_GEN_A_CORE
Source: Patient [ x]  Family [ ]   other [ x] RN    Current Diet:   Diet, NPO after Midnight:      NPO Start Date: 31-Jan-2022,   NPO Start Time: 23:59 (01-31-22 @ 08:36)  Diet, Regular:   Soft and Bite Sized (SOFTBTSZ)  Supplement Feeding Modality:  Oral  Ensure Enlive Cans or Servings Per Day:  1       Frequency:  Three Times a day (01-27-22 @ 17:00)    Patient reports [ ] nausea  [ ] vomiting [ ] diarrhea [ ] constipation  [x ]chewing problems [ ] swallowing issues  [ ] other:     PO intake:  < 50% [ ]   50-75%  [x ]   %  [ ]  other :    Source for PO intake [x ] Patient [ ] family [ x] chart [x ] staff [ ] other    Current Weight:   (1/31) 133 lbs RD bedscale weight  (1/22) 128.7 lbs  (1/21) 136.2 lbs  (1/18) 140.4 lbs  (1/17) 141.3 lbs  (1/15) 135.5 lbs    % Weight Change: Recent weight relatively stable with admission weight    Pertinent Medications: MEDICATIONS  (STANDING):  dexAMETHasone     Tablet   Oral   dexAMETHasone     Tablet 2 milliGRAM(s) Oral every 6 hours  dextrose 40% Gel 15 Gram(s) Oral once  dextrose 5%. 1000 milliLiter(s) (50 mL/Hr) IV Continuous <Continuous>  dextrose 5%. 1000 milliLiter(s) (100 mL/Hr) IV Continuous <Continuous>  dextrose 50% Injectable 25 Gram(s) IV Push once  dextrose 50% Injectable 12.5 Gram(s) IV Push once  dextrose 50% Injectable 25 Gram(s) IV Push once  doxazosin 4 milliGRAM(s) Oral at bedtime  glucagon  Injectable 1 milliGRAM(s) IntraMuscular once  insulin lispro (ADMELOG) corrective regimen sliding scale   SubCutaneous Before meals and at bedtime  melatonin 5 milliGRAM(s) Oral at bedtime  metoprolol tartrate 25 milliGRAM(s) Oral two times a day  pantoprazole  Injectable 40 milliGRAM(s) IV Push daily  polyethylene glycol 3350 17 Gram(s) Oral daily  sodium chloride 1 Gram(s) Oral every 12 hours  valproic acid 250 milliGRAM(s) Oral two times a day    MEDICATIONS  (PRN):  acetaminophen     Tablet .. 650 milliGRAM(s) Oral every 6 hours PRN Mild Pain (1 - 3)  hydrALAZINE Injectable 10 milliGRAM(s) IV Push every 2 hours PRN SBP > 140  labetalol Injectable 10 milliGRAM(s) IV Push every 2 hours PRN SBP > 140  OLANZapine 2.5 milliGRAM(s) Oral every 12 hours PRN agitation  ondansetron Injectable 4 milliGRAM(s) IV Push every 6 hours PRN Nausea and/or Vomiting    Pertinent Labs: CBC Full  -  ( 30 Jan 2022 10:51 )  WBC Count : 21.79 K/uL  RBC Count : 3.83 M/uL  Hemoglobin : 12.0 g/dL  Hematocrit : 35.9 %  Platelet Count - Automated : 276 K/uL  Mean Cell Volume : 93.7 fl  Mean Cell Hemoglobin : 31.3 pg  Mean Cell Hemoglobin Concentration : 33.4 gm/dL  01-31 Na131 mmol/L<L> Glu 117 mg/dL<H> K+ 4.9 mmol/L Cr  0.31 mg/dL<L> BUN 20.4 mg/dL<H> Phos n/a   Alb n/a   PAB n/a       Skin: 1+ R arm edema    Nutrition focused physical exam conducted - found signs of malnutrition [ ]absent [x ]present    Subcutaneous fat loss: [ ] Orbital fat pads region, [ ]Buccal fat region, [ ]Triceps region,  [ ]Ribs region    Muscle wasting: [ x]Temples region, [ ]Clavicle region, [ ]Shoulder region, [ ]Scapula region, [ ]Interosseous region,  [ ]thigh region, [ ]Calf region    Estimated Needs:   [x ] no change since previous assessment  [ ] recalculated:     Current Nutrition Diagnosis: Pt presents at high nutrition risk secondary to malnutrition (moderate acute) related to inability to meet sufficient protein-energy needs in setting of UTI, brain mass and liver lesion as evidenced by meeting <75% EER >7 days, moderate temporal wasting. Aware Pt s/p L craniotomy (1/19), POD #12 postoperative course complicated with hemorrhage in surgical bed/biopsy tract.   Per RN Pt with fair PO intake, eats very slowly with assistance. Pt taking Ensure, prefers vanilla. Plan for EUS guided liver biopsy tomorrow.     Recommendations:   1) Continue current diet order   2) Continue Ensure Enlive TID   3) Continue to provide encouragement/assistance as needed during mealtimes to inc PO   4) Encourage HBV protein sources   5) Monitor weights daily for trend/accuracy     Monitoring and Evaluation:   [x ] PO intake [x ] Tolerance to diet prescription [X] Weights  [X] Follow up per protocol [X] Labs:

## 2022-01-31 NOTE — PROGRESS NOTE ADULT - ASSESSMENT
Patient is a 82 y/o F PMH HTN transferred from [facility] [place] s/p trip & fall. Patient states she came home from work, didn't feel well and then fell and hit her head into her bathtub.  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     CT of c/ap showed mild MS LADs, and left hepatic mass. MRI abd showed left hepatic lobe atrophy and mild intrahepatic dilatation involving segments 2 and 3. Suspicion for stricturing mass at the junction of the medial and lateral left lobes. 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  -MRI abd showed stricturing mass at the junction of the medial and lateral left lobes. 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  -MRI abd findings are concerning for 2nd malignancy as primary CNS lymphoma rarely has disease outside the brain.   - Plan for Liver Biopsy on 2/1/22  - Talked to Patients Son in Law) Daughter Ewa was at work .Patient is very functional Was working till admission . Works as a  Assistant. 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 Discuss with rest of family about chemo/ transfer to [facility]

## 2022-01-31 NOTE — PROGRESS NOTE ADULT - ASSESSMENT
81y Female PMH HTN, transferred from [facility] after trip and fall, also attests to feeling off for 1 month, now found with corpus callosal lesion, now s/p craniotomy for biopsy 1/19/22 now POD#12, (primary CNS lymphoma    - 2 runs of NSVT this am. Echo ordered, labs by medicine.   -bx of brain mass showed DLBCL, Ki 67 80%)  postoperative course complicated with hemorrhage in surgical bed/biopsy tract  - Hem/onc consulted, recommending biopsy of hepatic lesion (MRI abd findings are concerning for 2nd malignancy as primary CNS lymphoma rarely has disease outside the brain per heme/onc.)-  - GI reconsulted Friday (1/28)  Will see over the wkend (would obtain biopsy of the liver mass to guide treatment, as regimen will be different for primary CNS lymphoma, peripheral DLBCL vs primary billary neoplasm). IR biopsy scheduled for tomorrow 2/1/22  - Continue  BID  - Melatonin 5 QHS  - On decadron taper unless otherwise recommended by oncology  - SBP goal 100-140; on amlodipine 5 QD  - On soft and bite sized diet; continue to follow up SLP recs  - Continue cardura 4 mg daily  - Na improved; 2% dced given rapid increase; salt tabs 1 g Q12 ordered to avoid rapid decrease  - Hospitalist consulted, appreciated  - On lovenox 40 for DVT ppx  - UTI, completed 3 days of Rocephin Urine culture done, results pending  - Continues to have urinary retention. Orozco placed on 1/30/22  - Dispo on hold, pending biopsy, possible need to transfer to [facility] for inpatient chemo  - PT/OT/PMR/SLP following- recommending AR   81y Female PMH HTN, [place] [facility] [place], also attests to feeling off for 1 month, now found with corpus callosal lesion, now s/p craniotomy for biopsy 1/19/22 now POD#12, (primary CNS lymphoma    - 2 runs of NSVT this am. Echo ordered, labs by medicine.   -bx of brain mass showed DLBCL, Ki 67 80%)  postoperative course complicated with hemorrhage in surgical bed/biopsy tract  - Hem/onc consulted, recommending biopsy of hepatic lesion (MRI abd findings are concerning for 2nd malignancy as primary CNS lymphoma rarely has disease outside the brain per heme/onc.)-  - GI reconsulted Friday (1/28)  Will see over the wkend (would obtain biopsy of the liver mass to guide treatment, as regimen will be different for primary CNS lymphoma, peripheral DLBCL vs primary billary neoplasm). IR biopsy scheduled for tomorrow 2/1/22. Not on lovenox   - Continue  BID  - Melatonin 5 QHS  - On decadron taper unless otherwise recommended by oncology  - SBP goal 100-140; on amlodipine 5 QD  - On soft and bite sized diet; continue to follow up SLP recs  - Continue cardura 4 mg daily  - Na improved; 2% dced given rapid increase; salt tabs 1 g Q12 ordered to avoid rapid decrease  - Hospitalist consulted, appreciated  - UTI, completed 3 days of Rocephin Urine culture done, results pending  - Continues to have urinary retention. Orozco placed on 1/30/22  - Dispo on hold, pending biopsy, possible need to transfer to [facility] for inpatient chemo  - PT/OT/PMR/SLP following- recommending AR  - If medical problems persist, pt will need to be transferred to the medical service for management

## 2022-02-01 NOTE — PROGRESS NOTE ADULT - ASSESSMENT
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

## 2022-02-01 NOTE — PROGRESS NOTE ADULT - SUBJECTIVE AND OBJECTIVE BOX
Patient is a 81y old  Female who presents with a chief complaint of Brain mass (31 Jan 2022 12:00)      HEALTH ISSUES - PROBLEM Dx:  HTN  leukocytosis   hyponatremia   primary cns lymphoma         INTERVAL HPI/OVERNIGHT EVENTS:  Patient seen and examined at bedside. No acute events overnight. Patient states she is feeling well, just anxious bc she wants the procedure performed. Aside from this reports she is tired, trying to stay positive. No other complaints at this time. Patient denies chest pain, SOB, abd pain, N/V, fever, chills, dysuria or any other complaints. All remainder ROS negative.     Vital Signs Last 24 Hrs  T(C): 36.6 (01 Feb 2022 08:38), Max: 36.7 (31 Jan 2022 17:09)  T(F): 97.8 (01 Feb 2022 08:38), Max: 98.1 (31 Jan 2022 17:09)  HR: 74 (01 Feb 2022 08:38) (73 - 92)  BP: 122/73 (01 Feb 2022 08:38) (122/73 - 151/80)  BP(mean): --  RR: 18 (01 Feb 2022 08:38) (18 - 18)  SpO2: 96% (01 Feb 2022 08:38) (91% - 96%)    CAPILLARY BLOOD GLUCOSE  POCT Blood Glucose.: 87 mg/dL (01 Feb 2022 10:14)  POCT Blood Glucose.: 84 mg/dL (01 Feb 2022 07:53)  POCT Blood Glucose.: 114 mg/dL (31 Jan 2022 22:07)  POCT Blood Glucose.: 131 mg/dL (31 Jan 2022 16:15)      I&O's Summary    31 Jan 2022 07:01  -  01 Feb 2022 07:00  --------------------------------------------------------  IN: 0 mL / OUT: 1550 mL / NET: -1550 mL        CONSTITUTIONAL: No distress awake, alert and in no apparent distress  CARDIAC: Normal rate, regular rhythm.  Heart sounds S1, S2.  No murmurs, rubs or gallops   RESPIRATORY: Breath sounds clear and equal bilaterally. No wheezes, rhales or rhonchi  GASTROENTEROLOGY: soft nt nd bs +normoactive   EXTREMITIES: No edema, cyanosis or deformity   NEUROLOGICAL: Alert and oriented to self and place not date, poor insight into her medical condition   SKIN: No rash, skin turgor poor     MEDICATIONS  (STANDING):  dexAMETHasone     Tablet   Oral   dexAMETHasone     Tablet 2 milliGRAM(s) Oral every 6 hours  dextrose 40% Gel 15 Gram(s) Oral once  dextrose 5%. 1000 milliLiter(s) (50 mL/Hr) IV Continuous <Continuous>  dextrose 5%. 1000 milliLiter(s) (100 mL/Hr) IV Continuous <Continuous>  dextrose 50% Injectable 25 Gram(s) IV Push once  dextrose 50% Injectable 12.5 Gram(s) IV Push once  dextrose 50% Injectable 25 Gram(s) IV Push once  doxazosin 4 milliGRAM(s) Oral at bedtime  glucagon  Injectable 1 milliGRAM(s) IntraMuscular once  insulin lispro (ADMELOG) corrective regimen sliding scale   SubCutaneous Before meals and at bedtime  melatonin 5 milliGRAM(s) Oral at bedtime  metoprolol tartrate 25 milliGRAM(s) Oral two times a day  pantoprazole  Injectable 40 milliGRAM(s) IV Push daily  polyethylene glycol 3350 17 Gram(s) Oral daily  sodium chloride 1 Gram(s) Oral three times a day  valproic acid 250 milliGRAM(s) Oral two times a day    MEDICATIONS  (PRN):  acetaminophen     Tablet .. 650 milliGRAM(s) Oral every 6 hours PRN Mild Pain (1 - 3)  hydrALAZINE Injectable 10 milliGRAM(s) IV Push every 2 hours PRN SBP > 140  labetalol Injectable 10 milliGRAM(s) IV Push every 2 hours PRN SBP > 140  OLANZapine 2.5 milliGRAM(s) Oral every 12 hours PRN agitation  ondansetron Injectable 4 milliGRAM(s) IV Push every 6 hours PRN Nausea and/or Vomiting      LABS:                        13.2   19.73 )-----------( 249      ( 01 Feb 2022 07:42 )             38.3     02-01    134<L>  |  99  |  20.2<H>  ----------------------------<  104<H>  4.1   |  22.0  |  0.30<L>    Ca    7.9<L>      01 Feb 2022 07:42  Mg     2.1     01-31    TPro  4.8<L>  /  Alb  2.7<L>  /  TBili  0.4  /  DBili  x   /  AST  17  /  ALT  30  /  AlkPhos  94  02-01    PT/INR - ( 01 Feb 2022 07:42 )   PT: 11.8 sec;   INR: 1.02 ratio         PTT - ( 01 Feb 2022 07:42 )  PTT:30.6 sec    LIVER FUNCTIONS - ( 01 Feb 2022 07:42 )  Alb: 2.7 g/dL / Pro: 4.8 g/dL / ALK PHOS: 94 U/L / ALT: 30 U/L / AST: 17 U/L / GGT: x             RADIOLOGY & ADDITIONAL TESTS:  No new imaging studies

## 2022-02-01 NOTE — PROGRESS NOTE ADULT - SUBJECTIVE AND OBJECTIVE BOX
INTERVAL HPI/OVERNIGHT EVENTS:    80y/o transferred from [place] with a hx of a fall.    Pt struck head.  Denies LOC ,   Pt has noticed that she has been unsteady on her feet.      [facility] reveals Butterfly type hyperattenuating lesion involving the corpus callosum.   Procedure done on 1/19 left cranio for biopsy      MEDICATIONS  (STANDING):  dexAMETHasone     Tablet 2 milliGRAM(s) Oral every 6 hours  dexAMETHasone     Tablet   Oral   dextrose 40% Gel 15 Gram(s) Oral once  dextrose 5%. 1000 milliLiter(s) (50 mL/Hr) IV Continuous <Continuous>  dextrose 5%. 1000 milliLiter(s) (100 mL/Hr) IV Continuous <Continuous>  dextrose 50% Injectable 25 Gram(s) IV Push once  dextrose 50% Injectable 12.5 Gram(s) IV Push once  dextrose 50% Injectable 25 Gram(s) IV Push once  doxazosin 4 milliGRAM(s) Oral at bedtime  glucagon  Injectable 1 milliGRAM(s) IntraMuscular once  insulin lispro (ADMELOG) corrective regimen sliding scale   SubCutaneous Before meals and at bedtime  melatonin 5 milliGRAM(s) Oral at bedtime  metoprolol tartrate 25 milliGRAM(s) Oral two times a day  pantoprazole  Injectable 40 milliGRAM(s) IV Push daily  polyethylene glycol 3350 17 Gram(s) Oral daily  sodium chloride 1 Gram(s) Oral three times a day  valproic acid 250 milliGRAM(s) Oral two times a day    MEDICATIONS  (PRN):  acetaminophen     Tablet .. 650 milliGRAM(s) Oral every 6 hours PRN Mild Pain (1 - 3)  hydrALAZINE Injectable 10 milliGRAM(s) IV Push every 2 hours PRN SBP > 140  labetalol Injectable 10 milliGRAM(s) IV Push every 2 hours PRN SBP > 140  OLANZapine 2.5 milliGRAM(s) Oral every 12 hours PRN agitation  ondansetron Injectable 4 milliGRAM(s) IV Push every 6 hours PRN Nausea and/or Vomiting      Allergies    Bananas (Angioedema; Anaphylaxis; Short breath)  Kiwi (Angioedema; Anaphylaxis; Short breath)  latex (Unknown)  Leshara (Angioedema; Anaphylaxis; Short breath)  No Known Drug Allergies  strawberry (Angioedema; Anaphylaxis; Short breath)    Intolerances          Vital Signs Last 24 Hrs  T(C): 36.4 (01 Feb 2022 16:47), Max: 36.7 (31 Jan 2022 17:09)  T(F): 97.6 (01 Feb 2022 16:47), Max: 98.1 (31 Jan 2022 17:09)  HR: 91 (01 Feb 2022 16:47) (73 - 94)  BP: 110/66 (01 Feb 2022 16:47) (105/68 - 151/80)  BP(mean): --  RR: 18 (01 Feb 2022 16:47) (16 - 18)  SpO2: 93% (01 Feb 2022 16:47) (91% - 96%) BMI (kg/m2): 23 (01-19-22 @ 12:00)      PHYSICAL EXAM  Pt very ataxia 2 person assist.   GENERAL: NAD, well-groomed, well-developed  HEAD:  Atraumatic, Normocephalic  EYES: EOMI, PERRLA, conjunctiva and sclera clear  ENMT: No tonsillar erythema, exudates, or enlargement; Moist mucous membranes,   NECK: Supple,   NERVOUS SYSTEM:  Alert & Oriented X3, Good concentration; Motor Strength 5/5 B/L upper and lower extremities; DTRs 2+ intact and symmetric, neg pronators.  CHEST/LUNG: Clear to percussion bilaterally; No rales, rhonchi, wheezing, or rubs  HEART: Regular rate and rhythm; No murmurs, rubs, or gallops  ABDOMEN: Soft, Nontender, Nondistended; Bowel sounds present  EXTREMITIES:  2+ Peripheral Pulses, No edema        LABS:                          13.2   19.73 )-----------( 249      ( 01 Feb 2022 07:42 )             38.3     02-01    134<L>  |  99  |  20.2<H>  ----------------------------<  104<H>  4.1   |  22.0  |  0.30<L>    Ca    7.9<L>      01 Feb 2022 07:42  Mg     2.1     01-31    TPro  4.8<L>  /  Alb  2.7<L>  /  TBili  0.4  /  DBili  x   /  AST  17  /  ALT  30  /  AlkPhos  94  02-01    PT/INR - ( 01 Feb 2022 07:42 )   PT: 11.8 sec;   INR: 1.02 ratio         PTT - ( 01 Feb 2022 07:42 )  PTT:30.6 sec    I&O's Detail    31 Jan 2022 07:01  -  01 Feb 2022 07:00  --------------------------------------------------------  IN:  Total IN: 0 mL    OUT:    Indwelling Catheter - Urethral (mL): 1550 mL  Total OUT: 1550 mL    Total NET: -1550 mL      RADIOLOGY & ADDITIONAL TESTS:  < from: CT Head No Cont (01.29.22 @ 04:15) >           PROCEDURE DATE:  01/29/2022    IMPRESSION:  Interval disbursement of extensive left occipital parietal hemorrhage. No   new hemorrhage. Preliminary report provided by [facility] JOSE L GARCIA M.D.;[facility] RADIOLOGIST.  --- End of Report ---  < end of copied text >

## 2022-02-01 NOTE — PROGRESS NOTE ADULT - ASSESSMENT
81 y.o. Female with hx of HTN, [place] [facility] [place], with ongoing sx of feeling unwell x 1 month, admitted to the NEURO sx service with corpus callosal lesion, now s/p craniotomy for biopsy 1/19/22 now POD#10, postoperative course complicated with hemorrhage in surgical bed/biopsy tract. Also noted on ct/ MRI AP showed mild MS LADs, and left hepatic mass. MRI abd showed left hepatic lobe atrophy and mild intrahepatic dilatation involving segments 2 and 3. Suspicion for stricturing mass at the junction of the medial and lateral left lobes. GI and heme/onc continue to follow the pt for next steps planned for EUS/ liver bx today. Medicine consulted for co management as pt with noted electrolyte abnormalities hyponatremia, dx and tx for UTI, also hospital course noted with suspected NSVT vs PVCS, pt pending tte.  Medicine continues to follow the pt     Primary CNS lymphoma  -s/p bx with post op complication with hemorrhage in surgical bed/biopsy tract.   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  -MRI abd showed stricturing mass at the junction of the medial and lateral left lobes. 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  -MRI abd findings are concerning for 2nd malignancy as primary CNS lymphoma rarely has disease outside the brain per heme/onc.   - pt npo for planned EUS and liver bx today to further assess for any additional malignancy   - c/w pain mx per neuro sx  - c/w depakene for seizure ppx per neuro sx  - c/w decadron taper per neurosx  -c/w zyprexa prn per neurosx  - GI f/u noted and appreciated  - heme/onc f/u noted and appreciated  - neuro sx notes reviewed   -Per prior hospitalist note clearance in the chart for planned bx 1/31       Suspected NSVT   - 2 episodes of 3 beats noted on cardiac monitor on 1/31  but no noted episodes since then. Upon further review looks more like PVCs possibly. Pt remains asymptomatic   - tele noted with few pvcs vs artifact   - HR controlled   - c/w metoprolol 25mg po bid with parameters   - monitor electrolytes closely mg>2 and potassium >4    - f/u tte   - based on findings will see if cardio consult needed      Hyponatremia  - hypochloremic hyponatremia 2/2 dehydration/ decreased po intake   - Na 134 improved   - goal sodium to be above 130   - encouraging pt to hydrate and take in increased oral intake   -c/w monitoring sodium closely     E. Coli UTI  UA positive   - afebrile  - leukocytosis down trended to 19 (likely current elevation from steroids)   - f/u U cx >[id number] E. coli   - pt completed 3 days of rocephin  on 1/30   - Orozco placed for urinary retention - TOV in 1-2 days     Leukocytosis - trending down   - likely related to steroids   - c/w trending wbc closely     Acute urinary retention   - Orozco placed on 1/30   - continue doxazosin     Hyperglycemia- A1C - 5.0   - no hx of DM - likely due to steroids   - fs stable 100-120 range  -c/w accuchecks ACHS TID   -c/w sliding scale coverage   -c/w hypoglycemia protocol     HTN  - bp stable  -c/w SBP goal 100-140  -c/w metoprolol po bid   -c/w monitoring bp closely     DVT ppx  - lovenox sq qd - on hold for planned IR biopsy today   - restart per neuro sx post bx    Dispo: Medicine will continue to follow the pt.       Labs/ last EKG on 1/22/22 / CT head / chest - noted and reviewed   patient denies any sob / cp - no hx of CAD/CHF/CKD -   short runs NSVT this am - patient asymptomatic ,   will get ECHO , check Mg/ K- supplement to keep Mg>2, K > 4 ,   no need to wait for ECHO results for biopsy - patient is asymptomatic ,   no absolute contraindications for planned IR biopsy .   d/w NS PA .   Medicine will follow .

## 2022-02-02 NOTE — PROGRESS NOTE ADULT - PROBLEM SELECTOR PLAN 1
MRI abdomen reported as left hepatic lobe atrophy and mild intrahepatic dilatation, suspicion for stricturing mass at the junction of the medial and lateral left lobes.  Labs reviewed. S/p craniotomy for biopsy (01/19/22).  - Brain biopsy results + for B-cell lymphoma. S/p 2/1 EGD/EUS guided liver biopsy showing Lesion of liver, Candidiasis of the esophagus, Gastritis, No Bleeding. Abdomen mildly distended and tender to touch. No bowel movement for the last couple of days. AFP and CEA negative   - Continue to trend CBC, CMP.    - Will order x-ray to assess abdominal distention.    - Will order Diflucan 200mg today and 100mg for the next 13 days for treatment of candidiasis of esophagus.    - PPI BID for mucosal cytoprotection.    - Continue order as ordered.

## 2022-02-02 NOTE — CHART NOTE - NSCHARTNOTEFT_GEN_A_CORE
Patient has abdominal distension. Had abdominal tenderness with sluggish bowel sounds. Will recommend NG tube placement and also obtain CT abdomen/pelvis. Will limit narcotics. Check CBC q 12 hourly.

## 2022-02-02 NOTE — PROGRESS NOTE ADULT - SUBJECTIVE AND OBJECTIVE BOX
Patient still waiting.   Would like to have an idea of what the plans are.  She realizes she needs rehab.    REVIEW OF SYSTEMS  Constitutional - No fever,  No fatigue  HEENT - No vertigo, No neck pain  Neurological - No headaches, No memory loss, No loss of strength, No numbness, No tremors  Musculoskeletal - No joint pain, No joint swelling, No muscle pain  Psychiatric - No depression, No anxiety    FUNCTIONAL PROGRESS   SLP  Speech Language Pathology Recommendations: 1. Soft & bite-sized solids, w/thin liquids2. Aspiration precautions3. Meds whole or crushed in puree4. 100% supervision for all meals due to reduced cognition5. Upright for all PO, small bites/sips, slow rate6. Oral care7. Place dentures prior to PO8. SLP to follow      PT  Bed Mobility: Sit to Supine:     · Level of [place]	moderate assist (50% patients effort)  · Physical Assist/Nonphysical Assist	1 person assist    Bed Mobility: Supine to Sit:     · Level of [place]	moderate assist (50% patients effort)  · Physical Assist/Nonphysical Assist	1 person assist    Bed Mobility Analysis:     · Impairments Contributing to Impaired Bed Mobility	impaired postural control; safety    Transfer: Sit to Stand:     · Level of [place]	moderate assist (50% patients effort)  · Physical Assist/Nonphysical Assist	2 person assist  · Assistive Device	attempted with RW, improved with b/l HHA and right knee blocked    Transfer: Stand to Sit:     · Level of [place]	moderate assist (50% patients effort)  · Physical Assist/Nonphysical Assist	2 person assist    Sit/Stand Transfer Safety Analysis:     · Transfer Safety Concerns Noted	decreased weight-shifting ability  · Impairments Contributing to Impaired Transfers	impaired postural control; decreased strength    Gait Skills:     · Level of [place]	maximum assist (25% patients effort)  · Physical Assist/Nonphysical Assist	2 person assist  · Assistive Device	b/l HHA  · Gait Distance	2 side steps    Gait Analysis:     · Gait Deviations Noted	decreased weight-shifting ability; needs right knee blocked and assist to advance LEs    Stair Negotiation:     · Level of [place]	unable to perform     OT  Bathing Training:     · Level of [place]	dependent (less than 25% patients effort)    Upper Body Dressing Training:     · Level of [place]	dependent (less than 25% patients effort)    Lower Body Dressing Training:     · Level of [place]	dependent (less than 25% patients effort)    Toilet Hygiene Training:     · Level of [place]	dependent (less than 25% patients effort); +ortega    [facility] Training:     · Level of [place]	maximum assist (25% patients effort)      VITALS  T(C): 36.4 (22 @ 04:20), Max: 36.7 (22 @ 00:09)  HR: 98 (22 @ 04:20) (78 - 112)  BP: 105/64 (22 @ 04:20) (105/64 - 131/71)  RR: 18 (22 @ 04:20) (16 - 18)  SpO2: 93% (22 @ 04:20) (93% - 95%)  Wt(kg): --    MEDICATIONS   acetaminophen     Tablet .. 650 milliGRAM(s) every 6 hours PRN  dexAMETHasone     Tablet     dexAMETHasone     Tablet 2 milliGRAM(s) every 6 hours  dextrose 40% Gel 15 Gram(s) once  dextrose 5%. 1000 milliLiter(s) <Continuous>  dextrose 5%. 1000 milliLiter(s) <Continuous>  dextrose 50% Injectable 25 Gram(s) once  dextrose 50% Injectable 12.5 Gram(s) once  dextrose 50% Injectable 25 Gram(s) once  doxazosin 4 milliGRAM(s) at bedtime  glucagon  Injectable 1 milliGRAM(s) once  hydrALAZINE Injectable 10 milliGRAM(s) every 2 hours PRN  insulin lispro (ADMELOG) corrective regimen sliding scale   Before meals and at bedtime  labetalol Injectable 10 milliGRAM(s) every 2 hours PRN  melatonin 5 milliGRAM(s) at bedtime  metoprolol tartrate 25 milliGRAM(s) two times a day  OLANZapine 2.5 milliGRAM(s) every 12 hours PRN  ondansetron Injectable 4 milliGRAM(s) every 6 hours PRN  pantoprazole  Injectable 40 milliGRAM(s) daily  polyethylene glycol 3350 17 Gram(s) daily  sodium chloride 1 Gram(s) three times a day  sodium chloride 0.9%. 1000 milliLiter(s) <Continuous>  valproic acid 250 milliGRAM(s) two times a day      RECENT LABS/IMAGING                          14.7   15.90 )-----------( 188      ( 2022 07:24 )             43.5         137  |  103  |  27.5<H>  ----------------------------<  110<H>  4.6   |  21.0<L>  |  0.47<L>    Ca    8.8      2022 07:24  Phos  4.0     02-02  Mg     2.1     02-    TPro  5.1<L>  /  Alb  2.2<L>  /  TBili  0.6  /  DBili  x   /  AST  21  /  ALT  31  /  AlkPhos  97  02-02    PT/INR - ( 2022 07:42 )   PT: 11.8 sec;   INR: 1.02 ratio         PTT - ( 2022 07:42 )  PTT:30.6 sec  Urinalysis Basic - ( 2022 22:03 )    Color: Yellow / Appearance: Clear / S.015 / pH: x  Gluc: x / Ketone: Small  / Bili: Negative / Urobili: 4 mg/dL   Blood: x / Protein: 15 / Nitrite: Negative   Leuk Esterase: Negative / RBC: 11-25 /HPF / WBC 3-5 /HPF   Sq Epi: x / Non Sq Epi: Occasional / Bacteria: Occasional                   MRI HEAD  - 1)  in addition to the corpus callosal infiltrative lesion, there are approximately 5 other lesions with associated edema and enhancement. Vasogenic edema is most extensive in the left parietal region. See full description above. Differential considerations, therefore, include the possibility of metastatic disease versus lymphoma rather than glioblastoma. Further assessment and correlation recommended.2)  no evidence of arterial or venous occlusive disease.    MRI ABD  - Left hepatic lobe atrophy and mild intrahepatic dilatation involving segments 2 and 3. Suspicion for stricturing mass at the junction of the medial and lateral left lobes. 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    MR Brain Stereotactic w/ IV Cont : 5.2 cm TRV x 3.3 cm AP x 3.1 cm CC solid enhancing lobulated mass in the posterior body of the corpus callosum. Moderate vasogenic edema is noted. A 1 cm enhancing lesion is seen in the LEFT parietal cortex and several subcentimeter enhancing lesions are seen in the bifrontal subcortical white matter.  FINDINGS suspicious for primary CNS lymphoma.    CT Head : 3.4 x 5.1 cm acute hemorrhage along the biopsy tract in the LEFT parietal lobe extending to the mass within the posterior corpus callosum. Pneumocephalus noted along the biopsy tract as well as within the mass. Overlying RIGHT parietal LEFT parietal craniotomy is noted.    CT Head : No change since 2:30 PM. Left parietal parenchymal hemorrhage along the biopsy tract to the mass in the splenium of the corpus callosum.    CT Head : STABLE HEMORRHAGE LEFT PARIETAL LOBE ALONG A BIOPSY TRACT LEADING TO A 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    CT Head : Interval disbursement of extensive left occipital parietal hemorrhage. No new hemorrhage.     ----------------------------------------------------------------------------------------  PHYSICAL EXAM  Constitutional - NAD, Comfortable  Extremities - No C/C/E, No calf tenderness   Neurologic Exam -                    Cognitive - AAO to self, location, PART situation      Communication - Fluent      Cranial Nerves - Left lip droop     Motor - No focal deficits  Psychiatric - Calm, Fatigued  ----------------------------------------------------------------------------------------  ASSESSMENT/PLAN  81yFemale with functional deficits after 2 weeks of feeling off, now found to have a brain mass and IPH post-biopsy  Brain Mass - Depakote, Decadron taper   IPH - Stable  HypoTN - NaCl  Sleep - Melatonin 5mg HS ()  HTN - Lopressor, Hydralazine, Labetalol  Pain - Tylenol  Confusion - Recommend DC  Zyprexa (has not received it)  Oropharyngeal Dysphagia - Soft and bite sized/Thins  DVT PPX - SCDs  Rehab - Rehab plans pending results of liver biopsy (may be transferred to [facility]). Will continue to follow.  Recommend ongoing mobilization by staff to maintain cardiopulmonary function and prevention of secondary complications related to debility. Discussed with rehab team.

## 2022-02-02 NOTE — PROGRESS NOTE ADULT - TIME BILLING
included review of relevant history, clinical examination, review of data and images, discussion of treatment with the multidisciplinary team and any consultants involved in this patient’s care as well as family discussion.
seen I reviewed the CT images labs medications
I reviewed labs notes MRI images
Patient with liver lesion. MRI images reviewed. Today's abdominal film reviewed. Labs reviewed    plan discussed with NP

## 2022-02-02 NOTE — CONSULT NOTE ADULT - ASSESSMENT
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 patient is critically ill and given condition suspect gi perforation, probably at stomach based on probable access for biopsy however can not exclude perforation from egd insufflation or spontaneous viscus perforation. Given overall condition lengthy discussion was held with the family who were explained of the current situation as well as the concurrent diagnoses. given current condition we would offer an exploratory laparotomy, and interventions given findings likely primary repair vs resection of GI tract, which might include prolonged mechanical ventilation support, avoidance of PO intake with TPN vs delayed enteral feeds, and high risk for complications + need for chemotherapy given diagnoses family decided to not have any surgical intervention    PLAN:    - No acute surgical intervention based on family discussion  - goals of care discussion per primary  - might go to comfort meassures  - NGT for decompression, symptom management

## 2022-02-02 NOTE — PROGRESS NOTE ADULT - SUBJECTIVE AND OBJECTIVE BOX
Patient is a 81y old  Female who presents with a chief complaint of Brain mass (2022 08:54)      SUBJECTIVE / OVERNIGHT EVENTS:  S/p biopsy of liver lesion on 22        MEDICATIONS  (STANDING):  dexAMETHasone     Tablet   Oral   dexAMETHasone     Tablet 2 milliGRAM(s) Oral every 6 hours  dextrose 40% Gel 15 Gram(s) Oral once  dextrose 5%. 1000 milliLiter(s) (50 mL/Hr) IV Continuous <Continuous>  dextrose 5%. 1000 milliLiter(s) (100 mL/Hr) IV Continuous <Continuous>  dextrose 50% Injectable 25 Gram(s) IV Push once  dextrose 50% Injectable 12.5 Gram(s) IV Push once  dextrose 50% Injectable 25 Gram(s) IV Push once  doxazosin 4 milliGRAM(s) Oral at bedtime  fluconAZOLE   Tablet 200 milliGRAM(s) Oral once  glucagon  Injectable 1 milliGRAM(s) IntraMuscular once  insulin lispro (ADMELOG) corrective regimen sliding scale   SubCutaneous Before meals and at bedtime  melatonin 5 milliGRAM(s) Oral at bedtime  metoprolol tartrate 25 milliGRAM(s) Oral two times a day  pantoprazole  Injectable 40 milliGRAM(s) IV Push daily  polyethylene glycol 3350 17 Gram(s) Oral daily  sodium chloride 1 Gram(s) Oral three times a day  sodium chloride 0.9%. 1000 milliLiter(s) (100 mL/Hr) IV Continuous <Continuous>  valproic acid 250 milliGRAM(s) Oral two times a day    MEDICATIONS  (PRN):  acetaminophen     Tablet .. 650 milliGRAM(s) Oral every 6 hours PRN Mild Pain (1 - 3)  hydrALAZINE Injectable 10 milliGRAM(s) IV Push every 2 hours PRN SBP > 140  labetalol Injectable 10 milliGRAM(s) IV Push every 2 hours PRN SBP > 140  OLANZapine 2.5 milliGRAM(s) Oral every 12 hours PRN agitation  ondansetron Injectable 4 milliGRAM(s) IV Push every 6 hours PRN Nausea and/or Vomiting    CAPILLARY BLOOD GLUCOSE      POCT Blood Glucose.: 101 mg/dL (2022 07:37)  POCT Blood Glucose.: 199 mg/dL (2022 21:21)  POCT Blood Glucose.: 121 mg/dL (2022 16:36)  POCT Blood Glucose.: 87 mg/dL (2022 10:14)    I&O's Summary    2022 07:01  -  2022 07:00  --------------------------------------------------------  IN: 100 mL / OUT: 0 mL / NET: 100 mL        PHYSICAL EXAM:  Vital Signs Last 24 Hrs  T(C): 36.4 (2022 04:20), Max: 36.7 (2022 00:09)  T(F): 97.6 (2022 04:20), Max: 98 (2022 00:09)  HR: 98 (2022 04:20) (78 - 112)  BP: 105/64 (2022 04:20) (105/64 - 131/71)  BP(mean): --  RR: 18 (2022 04:20) (16 - 18)  SpO2: 93% (2022 04:20) (93% - 95%)  CONSTITUTIONAL: NAD, well-developed, well-groomed  ENMT: Moist oral mucosa, no pharyngeal injection or exudates; normal dentition  RESPIRATORY: Normal respiratory effort; lungs are clear to auscultation bilaterally  CARDIOVASCULAR: Regular rate and rhythm, normal S1 and S2, no murmur/rub/gallop; No lower extremity edema; Peripheral pulses are 2+ bilaterally  ABDOMEN: Nontender to palpation, normoactive bowel sounds, no rebound/guarding; No hepatosplenomegaly  PSYCH: A+O to person, place, and time; affect appropriate  NEUROLOGY: CN 2-12 are intact and symmetric; no gross sensory deficits   SKIN: No rashes; no palpable lesions    LABS:                        14.7   15.90 )-----------( 188      ( 2022 07:24 )             43.5     02-02    137  |  103  |  27.5<H>  ----------------------------<  110<H>  4.6   |  21.0<L>  |  0.47<L>    Ca    8.8      2022 07:24  Phos  4.0     02-02  Mg     2.1     02-02    TPro  5.1<L>  /  Alb  2.2<L>  /  TBili  0.6  /  DBili  x   /  AST  21  /  ALT  31  /  AlkPhos  97  02-02    PT/INR - ( 2022 07:42 )   PT: 11.8 sec;   INR: 1.02 ratio         PTT - ( 2022 07:42 )  PTT:30.6 sec      Urinalysis Basic - ( 2022 22:03 )    Color: Yellow / Appearance: Clear / S.015 / pH: x  Gluc: x / Ketone: Small  / Bili: Negative / Urobili: 4 mg/dL   Blood: x / Protein: 15 / Nitrite: Negative   Leuk Esterase: Negative / RBC: 11-25 /HPF / WBC 3-5 /HPF   Sq Epi: x / Non Sq Epi: Occasional / Bacteria: Occasional        COVID-19 PCR: NotDetec (2022 17:15)  COVID-19 PCR: NotDetec (2022 07:25)  COVID-19 PCR: NotDetec (2022 04:49)  COVID-19 PCR: NotDetec (2022 20:10)      RADIOLOGY & ADDITIONAL TESTS:  Imaging from Last 24 Hours:    Electrocardiogram/QTc Interval:    COORDINATION OF CARE:  Care Discussed with Consultants/Other Providers:       Patient is a 81y old  Female who presents with a chief complaint of Brain mass (2022 08:54)    SUBJECTIVE / OVERNIGHT EVENTS:  S/p biopsy of liver lesion on 22  Abdominal Pain this AM       MEDICATIONS  (STANDING):  dexAMETHasone     Tablet   Oral   dexAMETHasone     Tablet 2 milliGRAM(s) Oral every 6 hours  dextrose 40% Gel 15 Gram(s) Oral once  dextrose 5%. 1000 milliLiter(s) (50 mL/Hr) IV Continuous <Continuous>  dextrose 5%. 1000 milliLiter(s) (100 mL/Hr) IV Continuous <Continuous>  dextrose 50% Injectable 25 Gram(s) IV Push once  dextrose 50% Injectable 12.5 Gram(s) IV Push once  dextrose 50% Injectable 25 Gram(s) IV Push once  doxazosin 4 milliGRAM(s) Oral at bedtime  fluconAZOLE   Tablet 200 milliGRAM(s) Oral once  glucagon  Injectable 1 milliGRAM(s) IntraMuscular once  insulin lispro (ADMELOG) corrective regimen sliding scale   SubCutaneous Before meals and at bedtime  melatonin 5 milliGRAM(s) Oral at bedtime  metoprolol tartrate 25 milliGRAM(s) Oral two times a day  pantoprazole  Injectable 40 milliGRAM(s) IV Push daily  polyethylene glycol 3350 17 Gram(s) Oral daily  sodium chloride 1 Gram(s) Oral three times a day  sodium chloride 0.9%. 1000 milliLiter(s) (100 mL/Hr) IV Continuous <Continuous>  valproic acid 250 milliGRAM(s) Oral two times a day    MEDICATIONS  (PRN):  acetaminophen     Tablet .. 650 milliGRAM(s) Oral every 6 hours PRN Mild Pain (1 - 3)  hydrALAZINE Injectable 10 milliGRAM(s) IV Push every 2 hours PRN SBP > 140  labetalol Injectable 10 milliGRAM(s) IV Push every 2 hours PRN SBP > 140  OLANZapine 2.5 milliGRAM(s) Oral every 12 hours PRN agitation  ondansetron Injectable 4 milliGRAM(s) IV Push every 6 hours PRN Nausea and/or Vomiting    CAPILLARY BLOOD GLUCOSE      POCT Blood Glucose.: 101 mg/dL (2022 07:37)  POCT Blood Glucose.: 199 mg/dL (2022 21:21)  POCT Blood Glucose.: 121 mg/dL (2022 16:36)  POCT Blood Glucose.: 87 mg/dL (2022 10:14)    I&O's Summary    2022 07:01  -  2022 07:00  --------------------------------------------------------  IN: 100 mL / OUT: 0 mL / NET: 100 mL        PHYSICAL EXAM:  Vital Signs Last 24 Hrs  T(C): 36.4 (2022 04:20), Max: 36.7 (2022 00:09)  T(F): 97.6 (2022 04:20), Max: 98 (2022 00:09)  HR: 98 (2022 04:20) (78 - 112)  BP: 105/64 (2022 04:20) (105/64 - 131/71)  BP(mean): --  RR: 18 (2022 04:20) (16 - 18)  SpO2: 93% (2022 04:20) (93% - 95%)  CONSTITUTIONAL: NAD, well-developed, well-groomed  ENMT: Moist oral mucosa, no pharyngeal injection or exudates; normal dentition  RESPIRATORY: Normal respiratory effort; lungs are clear to auscultation bilaterally  CARDIOVASCULAR: Regular rate and rhythm, normal S1 and S2, no murmur/rub/gallop; No lower extremity edema; Peripheral pulses are 2+ bilaterally  ABDOMEN: _+ abd Pain   PSYCH: A+O to person, place, and time; affect appropriate  NEUROLOGY: CN 2-12 are intact and symmetric; no gross sensory deficits   SKIN: No rashes; no palpable lesions    LABS:                        14.7   15.90 )-----------( 188      ( 2022 07:24 )             43.5     -    137  |  103  |  27.5<H>  ----------------------------<  110<H>  4.6   |  21.0<L>  |  0.47<L>    Ca    8.8      2022 07:24  Phos  4.0     02-02  Mg     2.1     02-02    TPro  5.1<L>  /  Alb  2.2<L>  /  TBili  0.6  /  DBili  x   /  AST  21  /  ALT  31  /  AlkPhos  97  02-02    PT/INR - ( 2022 07:42 )   PT: 11.8 sec;   INR: 1.02 ratio         PTT - ( 2022 07:42 )  PTT:30.6 sec      Urinalysis Basic - ( 2022 22:03 )    Color: Yellow / Appearance: Clear / S.015 / pH: x  Gluc: x / Ketone: Small  / Bili: Negative / Urobili: 4 mg/dL   Blood: x / Protein: 15 / Nitrite: Negative   Leuk Esterase: Negative / RBC: 11-25 /HPF / WBC 3-5 /HPF   Sq Epi: x / Non Sq Epi: Occasional / Bacteria: Occasional        COVID-19 PCR: NotDetec (2022 17:15)  COVID-19 PCR: NotDetec (2022 07:25)  COVID-19 PCR: NotDetec (2022 04:49)  COVID-19 PCR: NotDetec (2022 20:10)      RADIOLOGY & ADDITIONAL TESTS:  Imaging from Last 24 Hours:    Electrocardiogram/QTc Interval:    COORDINATION OF CARE:  Care Discussed with Consultants/Other Providers:

## 2022-02-02 NOTE — CHART NOTE - NSCHARTNOTEFT_GEN_A_CORE
Radiology alerted primary team CT Abdomen results. General surgery consulted. Discussed transfer to medical service w/ Dr. Robertson; pending general surgery recs will decide if transfer to medical service vs general surgery service if surgical intervention is warranted.     CT Abdomen and Pelvis No Cont (02.02.22 @ 18:50)   IMPRESSION:  New moderate to large ascites which appears simple.  Large pneumoperitoneum without evident source.  Ascending colitis.  Small bilateral pleural effusions.  Atrophic left hepatic lobe as previously demonstrated

## 2022-02-02 NOTE — PROGRESS NOTE ADULT - ASSESSMENT
Patient is a 80 y/o F PMH HTN transferred from [facility] s/p trip & fall. Patient states she came home from work, didn't feel well and then fell and hit her head into her bathtub.  CTH at 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     CT of c/ap showed mild MS LADs, and left hepatic mass. MRI abd showed left hepatic lobe atrophy and mild intrahepatic dilatation involving segments 2 and 3. Suspicion for stricturing mass at the junction of the medial and lateral left lobes. 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  -MRI abd showed stricturing mass at the junction of the medial and lateral left lobes. 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  -MRI abd findings are concerning for 2nd malignancy as primary CNS lymphoma rarely has disease outside the brain.   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 Patient states she came home from work, didn't feel well and then fell and hit her head into her bathtub.  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     CT of c/ap showed mild MS LADs, and left hepatic mass. MRI abd showed left hepatic lobe atrophy and mild intrahepatic dilatation involving segments 2 and 3. Suspicion for stricturing mass at the junction of the medial and lateral left lobes. 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  -MRI abd showed stricturing mass at the junction of the medial and lateral left lobes. 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  -MRI abd findings are concerning for 2nd malignancy as primary CNS lymphoma rarely has disease outside the brain.   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

## 2022-02-02 NOTE — CHART NOTE - NSCHARTNOTEFT_GEN_A_CORE
Patient seen and examined - she has peritonitis on physical exam this evening. CTAP reviewed - large amount of free air that mandates exploratory laparotomy. Patient is A&O x 1 currently.    Goals of care discussed with daughter Paula at [phone] and all risks, benefits and alternatives were discussed. In light of patient's CNS lymphoma, liver masses and liver biopsy performed yesterday via EGD/EUS and development of acute peritonitis today, all questions were answered regarding what exploratory laparotomy entails, her expected post-operative course which could include prolonged ventilator dependence plus still having to go ahead with chemotherapy/radiation for her oncologic prognosis. Daughter asked if I can call her back in 30 mins, which will give her enough time to discuss these findings with her siblings.     Another call made after 30 mins, at 10:45pm and at that point, daughter did express that she and her siblings know what she has been through already and would not want her remaining time to be complicated with surgeries, ventilator dependence, chemotherapy/radiation that could diminish her quality of life any further. Daughter proceeded by saying that she and her siblings have decided to not move forward with any surgical interventions and to initiate comfort/palliative measures to control her pain and to keep her comfortable.     Neurosurgery team and bedside nurse updated of this conversation.

## 2022-02-02 NOTE — PROCEDURE NOTE - ADDITIONAL PROCEDURE DETAILS
Pt's daughter in law at bedside.  Both pt and Ying were informed that the procedure would assist to distension and would help with dx.   PT tolerated procedure well.

## 2022-02-02 NOTE — CONSULT NOTE ADULT - SUBJECTIVE AND OBJECTIVE BOX
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 presence of liver lesions suspicious for mets, which underwent EGD EUS guided biopsy on 1/1 today complaining of abdominal pain and distended abdomen, CT scan showing large ascites and pneumoperitoneum. reason why our team was called      ROS: 10-system review is otherwise negative except HPI above.      PAST MEDICAL & SURGICAL HISTORY:  HTN (hypertension)    No significant past surgical history      FAMILY HISTORY:  No pertinent family history in first degree relatives      Family history not pertinent as reviewed with the patient.    SOCIAL HISTORY:  Denies any toxic habits    ALLERGIES: NKA Bananas (Angioedema; Anaphylaxis; Short breath)  Kiwi (Angioedema; Anaphylaxis; Short breath)  latex (Unknown)  Rock (Angioedema; Anaphylaxis; Short breath)  No Known Drug Allergies  strawberry (Angioedema; Anaphylaxis; Short breath)      HOME MEDICATIONS: ***  Home Medications:  valsartan-hydrochlorothiazide 80 mg-12.5 mg oral tablet: 1 tab(s) orally once a day (19 Jan 2022 14:10)      --------------------------------------------------------------------------------------------    PHYSICAL EXAM:   General: in bed  Neuro: A+Ox1  HEENT: EOMI, PERRLA, dry oral mucosa  Cardio: tachycardic  Resp: on NC  GI/Abd: distended, guarding, diffuse rebound tenderness  Vascular: All 4 extremities warm and well perfused.   Pelvis: stable  Musculoskeletal: All 4 extremities moving spontaneously, no limitations, no spinal tenderness.  --------------------------------------------------------------------------------------------    LABS                 13.7   29.02  )----------(  139       ( 02 Feb 2022 22:11 )               39.8      136    |  105    |  41.0   ----------------------------<  149        ( 02 Feb 2022 22:11 )  5.2     |  17.0   |  0.72     Ca    8.8        ( 02 Feb 2022 22:11 )  Phos  4.0       ( 02 Feb 2022 07:24 )  Mg     2.1       ( 02 Feb 2022 07:24 )    TPro  4.6    /  Alb  1.7    /  TBili  0.5    /  DBili  x      /  AST  24     /  ALT  26     /  AlkPhos  86     ( 02 Feb 2022 22:11 )    LIVER FUNCTIONS - ( 02 Feb 2022 22:11 )  Alb: 1.7 g/dL / Pro: 4.6 g/dL / ALK PHOS: 86 U/L / ALT: 26 U/L / AST: 24 U/L / GGT: x           PT/INR -  14.7 sec / 1.28 ratio   ( 02 Feb 2022 22:11 )         CAPILLARY BLOOD GLUCOSE              --------------------------------------------------------------------------------------------  IMAGING  < from: CT Abdomen and Pelvis No Cont (02.02.22 @ 18:50) >  New moderate to large ascites which appears simple.  Large pneumoperitoneum without evident source.  Ascending colitis.  Small bilateral pleural effusions.  Atrophic left hepatic lobe as previously demonstrated      < end of copied text >  ***

## 2022-02-02 NOTE — PROGRESS NOTE ADULT - ASSESSMENT
81 y.o. Female with hx of HTN, transferred from [facility] after s/p mechanical fall, with ongoing sx of feeling unwell x 1 month, admitted to the NEURO sx service with corpus callosal lesion, now s/p craniotomy for biopsy 1/19/22 now POD#10, postoperative course complicated with hemorrhage in surgical bed/biopsy tract. Also noted on ct/ MRI AP showed mild MS LADs, and left hepatic mass. MRI abd showed left hepatic lobe atrophy and mild intrahepatic dilatation involving segments 2 and 3. Suspicion for stricturing mass at the junction of the medial and lateral left lobes. GI and heme/onc continue to follow the pt for next steps planned for EUS/ liver bx today. Medicine consulted for co management as pt with noted electrolyte abnormalities hyponatremia, dx and tx for UTI, also hospital course noted with suspected NSVT vs PVCS, pt pending tte.  Medicine continues to follow the pt     Primary CNS lymphoma:   -s/p bx with post op complication with hemorrhage in surgical bed/biopsy tract.   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  -MRI abd showed stricturing mass at the junction of the medial and lateral left lobes. 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  -MRI abd findings are concerning for 2nd malignancy as primary CNS lymphoma rarely has disease outside the brain per heme/onc.   - pt  is s/p EUS and liver bx post procedure day 1 and she developed abdominal pain and tenderness, Xray KUB done showed distended stomach with air, GI is following, spoke with GI patient likely need NG tube to decompress it.   - c/w pain mx per neuro sx  - c/w depakene for seizure ppx per neuro sx  - c/w decadron taper per neurosx  -c/w zyprexa prn per neurosx  - heme/onc f/u noted and appreciated  - neuro sx notes reviewed   -Per prior hospitalist note clearance in the chart for planned bx 1/31       Suspected NSVT   - 2 episodes of 3 beats noted on cardiac monitor on 1/31  but no noted episodes since then. Upon further review looks more like PVCs possibly. Pt remains asymptomatic   - tele noted with few pvcs vs artifact   - HR controlled   - c/w metoprolol 25mg po bid with parameters   - monitor electrolytes closely mg>2 and potassium >4    - f/u tte   - based on findings will see if cardio consult needed      Hyponatremia  - hypochloremic hyponatremia 2/2 dehydration/ decreased po intake   - Na 137 improved   - encouraging pt to hydrate and take in increased oral intake   -c/w monitoring sodium closely     E. Coli UTI  UA positive   - afebrile  - leukocytosis down trended to 19 (likely current elevation from steroids)   - f/u U cx >[id number] E. coli   - pt completed 3 days of rocephin  on 1/30   - Orozco placed for urinary retention - TOV in 1-2 days     Leukocytosis - trending down   - likely related to steroids   - c/w trending wbc closely     Acute urinary retention   - Orozco placed on 1/30   - continue doxazosin     Hyperglycemia- A1C - 5.0   - no hx of DM - likely due to steroids   - fs stable 100-120 range  -c/w accuchecks ACHS TID   -c/w sliding scale coverage   -c/w hypoglycemia protocol     HTN  - bp stable  -c/w SBP goal 100-140  -c/w metoprolol po bid   -c/w monitoring bp closely     DVT ppx  - lovenox sq qd - on hold for planned IR biopsy today   - restart per neuro sx post bx    Dispo: Medicine will continue to follow the pt.

## 2022-02-02 NOTE — PROGRESS NOTE ADULT - ASSESSMENT
81yf Transferred from [facility], Denies LOC.    Pmhx: HTN,   Left cranio for bx for corpus callosum.  Path for lymphoma Large B cell  Decadron 2mg q 6  awaiting liver bx result  Oncology following and pt family requesting to follow with [facility].  81yf Transferred from [facility], Denies LOC.    Pmhx: HTN,   Left cranio for bx for corpus callosum.  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   Pt c/o abd pain IR informed and a ct of the abd ordered

## 2022-02-02 NOTE — PROGRESS NOTE ADULT - SUBJECTIVE AND OBJECTIVE BOX
Chief Complaint: Patient is a 81y old  Female who presents with a chief complaint of Brain mass. Being seen in follow-up consultation for radiology finding of liver lesion.       Interval Events / Subjective: Patient seen and evaluated at bedside, reporting no complaints, no overnight events. Patient resting in bed. Abdomen mildly distended and tender to touch. No bowel movement for the last couple of days. S/p EGD/EUS guided liver biopsy showing Lesion of liver, Candidiasis of the esophagus, Gastritis, No Bleeding. Denies nausea, vomiting, abdominal pain, chest pain, shortness of breath, hematemesis, hematochezia, melena. Tolerating diet.         REVIEW OF SYSTEMS:   General: Negative  HEENT: Negative  CV: Negative  Respiratory: Negative  GI: See HPI  : Negative  MSK: Negative  Hematologic: Negative  Skin: Negative    MEDICATIONS:   MEDICATIONS  (STANDING):  dexAMETHasone     Tablet   Oral   dexAMETHasone     Tablet 2 milliGRAM(s) Oral every 6 hours  dextrose 40% Gel 15 Gram(s) Oral once  dextrose 5%. 1000 milliLiter(s) (50 mL/Hr) IV Continuous <Continuous>  dextrose 5%. 1000 milliLiter(s) (100 mL/Hr) IV Continuous <Continuous>  dextrose 50% Injectable 25 Gram(s) IV Push once  dextrose 50% Injectable 12.5 Gram(s) IV Push once  dextrose 50% Injectable 25 Gram(s) IV Push once  doxazosin 4 milliGRAM(s) Oral at bedtime  glucagon  Injectable 1 milliGRAM(s) IntraMuscular once  insulin lispro (ADMELOG) corrective regimen sliding scale   SubCutaneous Before meals and at bedtime  melatonin 5 milliGRAM(s) Oral at bedtime  metoprolol tartrate 25 milliGRAM(s) Oral two times a day  pantoprazole  Injectable 40 milliGRAM(s) IV Push daily  polyethylene glycol 3350 17 Gram(s) Oral daily  sodium chloride 1 Gram(s) Oral three times a day  sodium chloride 0.9%. 1000 milliLiter(s) (100 mL/Hr) IV Continuous <Continuous>  valproic acid 250 milliGRAM(s) Oral two times a day    MEDICATIONS  (PRN):  acetaminophen     Tablet .. 650 milliGRAM(s) Oral every 6 hours PRN Mild Pain (1 - 3)  hydrALAZINE Injectable 10 milliGRAM(s) IV Push every 2 hours PRN SBP > 140  labetalol Injectable 10 milliGRAM(s) IV Push every 2 hours PRN SBP > 140  OLANZapine 2.5 milliGRAM(s) Oral every 12 hours PRN agitation  ondansetron Injectable 4 milliGRAM(s) IV Push every 6 hours PRN Nausea and/or Vomiting      ALLERGIES:   Allergies    Bananas (Angioedema; Anaphylaxis; Short breath)  Kiwi (Angioedema; Anaphylaxis; Short breath)  latex (Unknown)  Rock (Angioedema; Anaphylaxis; Short breath)  No Known Drug Allergies  strawberry (Angioedema; Anaphylaxis; Short breath)    Intolerances        VITAL SIGNS:   Vital Signs Last 24 Hrs  T(C): 36.4 (2022 04:20), Max: 36.7 (2022 00:09)  T(F): 97.6 (2022 04:20), Max: 98 (2022 00:09)  HR: 98 (2022 04:20) (78 - 112)  BP: 105/64 (2022 04:20) (105/64 - 131/71)  BP(mean): --  RR: 18 (2022 04:20) (16 - 18)  SpO2: 93% (2022 04:20) (93% - 95%)  I&O's Summary    2022 07:01  -  2022 07:00  --------------------------------------------------------  IN: 100 mL / OUT: 0 mL / NET: 100 mL        PHYSICAL EXAM:   GENERAL:  No acute distress  HEENT:  NC/AT,  conjunctivae clear, sclera -anicteric  CHEST:  Full & symmetric excursion, no increased effort, breath sounds clear  HEART:  Regular rhythm, S1, S2, no murmur/rub/S3/S4,  no edema  ABDOMEN:  Softly distended, Tender on palpitation, normoactive bowel sounds.  EXTREMITIES: No cyanosis, clubbing or edema  SKIN:  No rash/erythema/ecchymoses/petechiae/wounds/abscess/warm/dry  NEURO:  Alert, oriented    LABS:  CBC Full  -  ( 2022 07:24 )  WBC Count : 15.90 K/uL  RBC Count : 4.75 M/uL  Hemoglobin : 14.7 g/dL  Hematocrit : 43.5 %  Platelet Count - Automated : 188 K/uL  Mean Cell Volume : 91.6 fl  Mean Cell Hemoglobin : 30.9 pg  Mean Cell Hemoglobin Concentration : 33.8 gm/dL  Auto Neutrophil # : 15.49 K/uL  Auto Lymphocyte # : 0.00 K/uL  Auto Monocyte # : 0.27 K/uL  Auto Eosinophil # : 0.00 K/uL  Auto Basophil # : 0.00 K/uL  Auto Neutrophil % : 92.2 %  Auto Lymphocyte % : 0.0 %  Auto Monocyte % : 1.7 %  Auto Eosinophil % : 0.0 %  Auto Basophil % : 0.0 %    -    137  |  103  |  27.5<H>  ----------------------------<  110<H>  4.6   |  21.0<L>  |  0.47<L>    Ca    8.8      2022 07:24  Phos  4.0     02-  Mg     2.1     -    TPro  5.1<L>  /  Alb  2.2<L>  /  TBili  0.6  /  DBili  x   /  AST  21  /  ALT  31  /  AlkPhos  97  02-02    LIVER FUNCTIONS - ( 2022 07:24 )  Alb: 2.2 g/dL / Pro: 5.1 g/dL / ALK PHOS: 97 U/L / ALT: 31 U/L / AST: 21 U/L / GGT: x           PT/INR - ( 2022 07:42 )   PT: 11.8 sec;   INR: 1.02 ratio         PTT - ( 2022 07:42 )  PTT:30.6 sec  Urinalysis Basic - ( 2022 22:03 )    Color: Yellow / Appearance: Clear / S.015 / pH: x  Gluc: x / Ketone: Small  / Bili: Negative / Urobili: 4 mg/dL   Blood: x / Protein: 15 / Nitrite: Negative   Leuk Esterase: Negative / RBC: 11-25 /HPF / WBC 3-5 /HPF   Sq Epi: x / Non Sq Epi: Occasional / Bacteria: Occasional          RADIOLOGY & ADDITIONAL STUDIES (The following images were personally reviewed):    EGD-EUS (Upper) Procedure Report Date: 2022     Patient Name: TYSON BERNAL     MRN #: [id number]         Endoscopic Ultrasound Findings:     The gastroscope was then exchanged for the echoendoscope and both the radial and    linear echoendoscopes were used to scan the pancreatic parenchyma and liver. The    pancreatic body and tail were initially examined from the gastric body and    fundus revealing no parenchymal abnormalities and a normal PD from the tail to    the neck of pancreas. The pancreatic head and uncinate process were then    examined from the duodenum revealing a no parenchymal abnormalities and a normal    PD. There was no evidence of chronic pancreatitis.  Careful examination of the    liver was performed. In the left lobe of the liver there were multiple cystic    lesions were noted. There was a 1.41 cm x 1.28 cm hypoechoic lesion lesion in    the left liver and adjacent to that there were dilated bile ducts were noted.    Using the transgastric approach, inSparq 25-gauge shark core needle and the    Olympus 25-gauge EZ shot needle, total 6 FNA passes were obtained. Color Doppler    was used to see and avoid any vascular injury. Cytopathologist was present in    the room.            Impressions:      Lesion of liver.      Candidiasis of the esophagus.      Gastritis, No Bleeding.         Plan: Await Pathology/Cytology Orders    Return to floor for further management    Advance diet as tolerated              Korey Morales MD      Version 1, Electronically signed on 2022 6:10:46 PM by Korey Morales MD

## 2022-02-02 NOTE — PROGRESS NOTE ADULT - TIME-BASED
Prn Dilaudid and scheduled medications administered. Pt resting in bed. No further needs expressed. Call light within reach. Will continue to monitor.
35
30
25
25

## 2022-02-02 NOTE — PROGRESS NOTE ADULT - SUBJECTIVE AND OBJECTIVE BOX
INTERVAL HPI/OVERNIGHT EVENTS:  82y/o transferred from [place] with a hx of a fall.    Pt struck head.  Denies LOC ,   Pt has noticed that she has been unsteady on her feet.     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   Procedure done on  left cranio for biopsy, Liver bx on .  Pt seen this am -pt more confused this am. States she is having a bad day. PT denies headache.       MEDICATIONS  (STANDING):  dexAMETHasone     Tablet   Oral   dexAMETHasone     Tablet 2 milliGRAM(s) Oral every 6 hours  dextrose 40% Gel 15 Gram(s) Oral once  dextrose 5%. 1000 milliLiter(s) (50 mL/Hr) IV Continuous <Continuous>  dextrose 5%. 1000 milliLiter(s) (100 mL/Hr) IV Continuous <Continuous>  dextrose 50% Injectable 25 Gram(s) IV Push once  dextrose 50% Injectable 12.5 Gram(s) IV Push once  dextrose 50% Injectable 25 Gram(s) IV Push once  doxazosin 4 milliGRAM(s) Oral at bedtime  fluconAZOLE   Tablet 200 milliGRAM(s) Oral once  glucagon  Injectable 1 milliGRAM(s) IntraMuscular once  insulin lispro (ADMELOG) corrective regimen sliding scale   SubCutaneous Before meals and at bedtime  melatonin 5 milliGRAM(s) Oral at bedtime  metoprolol tartrate 25 milliGRAM(s) Oral two times a day  pantoprazole  Injectable 40 milliGRAM(s) IV Push daily  polyethylene glycol 3350 17 Gram(s) Oral two times a day  senna 2 Tablet(s) Oral at bedtime  sodium chloride 1 Gram(s) Oral three times a day  sodium chloride 0.9%. 1000 milliLiter(s) (100 mL/Hr) IV Continuous <Continuous>  valproic acid 250 milliGRAM(s) Oral two times a day    MEDICATIONS  (PRN):  acetaminophen     Tablet .. 650 milliGRAM(s) Oral every 6 hours PRN Mild Pain (1 - 3)  hydrALAZINE Injectable 10 milliGRAM(s) IV Push every 2 hours PRN SBP > 140  labetalol Injectable 10 milliGRAM(s) IV Push every 2 hours PRN SBP > 140  OLANZapine 2.5 milliGRAM(s) Oral every 12 hours PRN agitation  ondansetron Injectable 4 milliGRAM(s) IV Push every 6 hours PRN Nausea and/or Vomiting      Allergies  Bananas (Angioedema; Anaphylaxis; Short breath)  Kiwi (Angioedema; Anaphylaxis; Short breath)  latex (Unknown)  Rock (Angioedema; Anaphylaxis; Short breath)  No Known Drug Allergies  strawberry (Angioedema; Anaphylaxis; Short breath)    Intolerances    Vital Signs Last 24 Hrs  T(C): 36.4 (2022 04:20), Max: 36.7 (2022 00:09)  T(F): 97.6 (2022 04:20), Max: 98 (2022 00:09)  HR: 98 (2022 04:20) (78 - 112)  BP: 105/64 (2022 04:20) (105/64 - 131/71)  BP(mean): --  RR: 18 (2022 04:20) (16 - 18)  SpO2: 93% (2022 04:20) (93% - 95%) BMI (kg/m2): 23 (22 @ 12:00)      PHYSICAL EXAM  GENERAL: NAD,   HEAD:  Atraumatic, Normocephalic  EYES: EOMI, PERRLA, conjunctiva and sclera clear  ENMT: No tonsillar erythema, exudates, or enlargement; Moist mucous membranes, Good dentition, No lesions  NECK: Supple,   NERVOUS SYSTEM:  Alert & Oriented X3, Good concentration; Motor Strength 5/5 B/L upper and lower extremities; DTRs 2+ intact and symmetric  CHEST/LUNG: Clear to percussion bilaterally; No rales, rhonchi, wheezing, or rubs  HEART: Regular rate and rhythm; No murmurs, rubs, or gallops  ABDOMEN: Soft, Nontender, Nondistended; Bowel sounds present  EXTREMITIES:  2+ Peripheral Pulses, No clubbing, cyanosis, or edema  LYMPH: No lymphadenopathy noted  SKIN: No rashes or lesions      LABS:                          14.7   15.90 )-----------( 188      ( 2022 07:24 )             43.5     -    137  |  103  |  27.5<H>  ----------------------------<  110<H>  4.6   |  21.0<L>  |  0.47<L>    Ca    8.8      2022 07:24  Phos  4.0     02-02  Mg     2.1     02-02    TPro  5.1<L>  /  Alb  2.2<L>  /  TBili  0.6  /  DBili  x   /  AST  21  /  ALT  31  /  AlkPhos  97  02-02    PT/INR - ( 2022 07:42 )   PT: 11.8 sec;   INR: 1.02 ratio         PTT - ( 2022 07:42 )  PTT:30.6 sec  Urinalysis Basic - ( 2022 22:03 )    Color: Yellow / Appearance: Clear / S.015 / pH: x  Gluc: x / Ketone: Small  / Bili: Negative / Urobili: 4 mg/dL   Blood: x / Protein: 15 / Nitrite: Negative   Leuk Esterase: Negative / RBC: 11-25 /HPF / WBC 3-5 /HPF   Sq Epi: x / Non Sq Epi: Occasional / Bacteria: Occasional      I&O's Detail    2022 07:01  -  2022 07:00  --------------------------------------------------------  IN:    sodium chloride 0.9%: 100 mL  Total IN: 100 mL    OUT:    Oral Fluid: 0 mL  Total OUT: 0 mL    Total NET: 100 mL    RADIOLOGY & ADDITIONAL TESTS:  < from: MR Brain Stereotactic w/ IV Cont (22 @ 16:03) >  ACC: [id number] EXAM:  MR BRAIN IC FOR SRS                        PROCEDURE DATE:  2022    IMPRESSION:  5.2 cm TRV x 3.3 cm AP x 3.1 cm CC solid enhancing lobulated   mass in the posterior body of the corpus callosum. Moderate vasogenic   edema is noted. A 1 cm enhancing lesion is seen in the LEFT parietal   cortex and several subcentimeter enhancing lesions are seen in the   bifrontal subcortical white matter.  FINDINGS suspicious for primary CNS   lymphoma.  < end of copied text >     INTERVAL HPI/OVERNIGHT EVENTS:  82y/o transferred from [place] with a hx of a fall.    Pt struck head.  Denies LOC ,   Pt has noticed that she has been unsteady on her feet.     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   Procedure done on  left cranio for biopsy, Liver bx on .  Pt seen this am -pt more confused this am. States she is having a bad day. PT denies headache.       MEDICATIONS  (STANDING):  dexAMETHasone     Tablet   Oral   dexAMETHasone     Tablet 2 milliGRAM(s) Oral every 6 hours  dextrose 40% Gel 15 Gram(s) Oral once  dextrose 5%. 1000 milliLiter(s) (50 mL/Hr) IV Continuous <Continuous>  dextrose 5%. 1000 milliLiter(s) (100 mL/Hr) IV Continuous <Continuous>  dextrose 50% Injectable 25 Gram(s) IV Push once  dextrose 50% Injectable 12.5 Gram(s) IV Push once  dextrose 50% Injectable 25 Gram(s) IV Push once  doxazosin 4 milliGRAM(s) Oral at bedtime  fluconAZOLE   Tablet 200 milliGRAM(s) Oral once  glucagon  Injectable 1 milliGRAM(s) IntraMuscular once  insulin lispro (ADMELOG) corrective regimen sliding scale   SubCutaneous Before meals and at bedtime  melatonin 5 milliGRAM(s) Oral at bedtime  metoprolol tartrate 25 milliGRAM(s) Oral two times a day  pantoprazole  Injectable 40 milliGRAM(s) IV Push daily  polyethylene glycol 3350 17 Gram(s) Oral two times a day  senna 2 Tablet(s) Oral at bedtime  sodium chloride 1 Gram(s) Oral three times a day  sodium chloride 0.9%. 1000 milliLiter(s) (100 mL/Hr) IV Continuous <Continuous>  valproic acid 250 milliGRAM(s) Oral two times a day    MEDICATIONS  (PRN):  acetaminophen     Tablet .. 650 milliGRAM(s) Oral every 6 hours PRN Mild Pain (1 - 3)  hydrALAZINE Injectable 10 milliGRAM(s) IV Push every 2 hours PRN SBP > 140  labetalol Injectable 10 milliGRAM(s) IV Push every 2 hours PRN SBP > 140  OLANZapine 2.5 milliGRAM(s) Oral every 12 hours PRN agitation  ondansetron Injectable 4 milliGRAM(s) IV Push every 6 hours PRN Nausea and/or Vomiting      Allergies  Bananas (Angioedema; Anaphylaxis; Short breath)  Kiwi (Angioedema; Anaphylaxis; Short breath)  latex (Unknown)  Rock (Angioedema; Anaphylaxis; Short breath)  No Known Drug Allergies  strawberry (Angioedema; Anaphylaxis; Short breath)    Intolerances    Vital Signs Last 24 Hrs  T(C): 36.4 (2022 04:20), Max: 36.7 (2022 00:09)  T(F): 97.6 (2022 04:20), Max: 98 (2022 00:09)  HR: 98 (2022 04:20) (78 - 112)  BP: 105/64 (2022 04:20) (105/64 - 131/71)  BP(mean): --  RR: 18 (2022 04:20) (16 - 18)  SpO2: 93% (2022 04:20) (93% - 95%) BMI (kg/m2): 23 (22 @ 12:00)      PHYSICAL EXAM  GENERAL: NAD,   HEAD:  Atraumatic, Normocephalic  EYES: EOMI, PERRLA, conjunctiva and sclera clear  ENMT: No tonsillar erythema, exudates, or enlargement; Moist mucous membranes, Good dentition, No lesions  NECK: Supple,   NERVOUS SYSTEM:  Alert & Oriented Xself place; Motor Strength 5/5 B/L upper and lower extremities; DTRs 2+ intact and symmetric  CHEST/LUNG: Clear BS bilaterally  HEART: Regular rate and rhythm; No murmurs, rubs, or gallops  ABDOMEN: mild distension, upper quad greater than lower,   positive tenderness of the LLQ, pos intermittent bs.   ortega inplace- urine dark ethan.  Bladder scan perform neg for distension.     EXTREMITIES:  2+ Peripheral Pulses, No edema  rectal good rectal tone, small external hemorrhoid noted, neg active bleeding.   positive stool brown in vault neg impaction.      LABS:                          14.7   15.90 )-----------( 188      ( 2022 07:24 )             43.5     02-    137  |  103  |  27.5<H>  ----------------------------<  110<H>  4.6   |  21.0<L>  |  0.47<L>    Ca    8.8      2022 07:24  Phos  4.0     02-  Mg     2.1     02-    TPro  5.1<L>  /  Alb  2.2<L>  /  TBili  0.6  /  DBili  x   /  AST  21  /  ALT  31  /  AlkPhos  97  02-02    PT/INR - ( 2022 07:42 )   PT: 11.8 sec;   INR: 1.02 ratio         PTT - ( 2022 07:42 )  PTT:30.6 sec  Urinalysis Basic - ( 2022 22:03 )    Color: Yellow / Appearance: Clear / S.015 / pH: x  Gluc: x / Ketone: Small  / Bili: Negative / Urobili: 4 mg/dL   Blood: x / Protein: 15 / Nitrite: Negative   Leuk Esterase: Negative / RBC: 11-25 /HPF / WBC 3-5 /HPF   Sq Epi: x / Non Sq Epi: Occasional / Bacteria: Occasional      I&O's Detail    2022 07:01  -  2022 07:00  --------------------------------------------------------  IN:    sodium chloride 0.9%: 100 mL  Total IN: 100 mL    OUT:    Oral Fluid: 0 mL  Total OUT: 0 mL    Total NET: 100 mL    RADIOLOGY & ADDITIONAL TESTS:  < from: MR Brain Stereotactic w/ IV Cont (22 @ 16:03) >  ACC: [id number] EXAM:  MR BRAIN IC FOR SRS                        PROCEDURE DATE:  2022    IMPRESSION:  5.2 cm TRV x 3.3 cm AP x 3.1 cm CC solid enhancing lobulated   mass in the posterior body of the corpus callosum. Moderate vasogenic   edema is noted. A 1 cm enhancing lesion is seen in the LEFT parietal   cortex and several subcentimeter enhancing lesions are seen in the   bifrontal subcortical white matter.  FINDINGS suspicious for primary CNS   lymphoma.  < end of copied text >  < from: Xray Kidney Ureter Bladder (22 @ 11:23) >  ACC: [id number] EXAM:  XR KUB 1 VIEW                        PROCEDURE DATE:  2022    INTERPRETATION:  AP supine and abdominal study. Patient has abdominal   distention.  Stomach is quite distended with air.  There is also bowel distention the mid abdomen possibly in the transverse   colon.  IMPRESSION: Bowel distention as above.  --- End of Report ---  < end of copied text >

## 2022-02-02 NOTE — PROCEDURE NOTE - NSPROCDETAILS_GEN_ALL_CORE
nasogastric/placement confirmed by auscultation/gastric secretions aspirated, placement confirmed/bowel sounds present to 4 quadrants/connected to suction

## 2022-02-02 NOTE — PROGRESS NOTE ADULT - SUBJECTIVE AND OBJECTIVE BOX
TYSON BERNAL    [id number]    81y      Female    Patient is a 81y old  Female who presents with a chief complaint of Brain mass (2022 11:12)      INTERVAL HPI/OVERNIGHT EVENTS:    Patient is poor historian, she complaining of abdominal pain, denies vomiting, has no fever, chills, as per nursing staff she has constipation      REVIEW OF SYSTEMS:  limited       Vital Signs Last 24 Hrs  T(C): 36.4 (2022 11:37), Max: 36.7 (2022 00:09)  T(F): 97.5 (2022 11:37), Max: 98 (2022 00:09)  HR: 114 (2022 11:37) (78 - 118)  BP: 100/59 (2022 11:37) (100/59 - 131/71)  RR: 18 (2022 11:37) (16 - 18)  SpO2: 97% (2022 11:37) (92% - 97%)    PHYSICAL EXAM:    GENERAL: Elderly female looking uncomfortable   HEENT: PERRL, +EOMI  NECK: soft, Supple, No JVD   CHEST/LUNG: Decrease air entry bilaterally; No wheezing  HEART: S1S2+, Regular rate and rhythm; No murmurs  ABDOMEN: Abdomen is diffused tender, bowel sound sluggish   EXTREMITIES:  1+ Peripheral Pulses, No edema  SKIN: No rashes or lesions  NEURO: AAOX3, no focal deficits, no motor r sensory loss  PSYCH: normal mood      LABS:                        14.7   15.90 )-----------( 188      ( 2022 07:24 )             43.5     02-02    137  |  103  |  27.5<H>  ----------------------------<  110<H>  4.6   |  21.0<L>  |  0.47<L>    Ca    8.8      2022 07:24  Phos  4.0     02-02  Mg     2.1     -02    TPro  5.1<L>  /  Alb  2.2<L>  /  TBili  0.6  /  DBili  x   /  AST  21  /  ALT  31  /  AlkPhos  97  02-02    PT/INR - ( 2022 07:42 )   PT: 11.8 sec;   INR: 1.02 ratio         PTT - ( 2022 07:42 )  PTT:30.6 sec  Urinalysis Basic - ( 2022 22:03 )    Color: Yellow / Appearance: Clear / S.015 / pH: x  Gluc: x / Ketone: Small  / Bili: Negative / Urobili: 4 mg/dL   Blood: x / Protein: 15 / Nitrite: Negative   Leuk Esterase: Negative / RBC: 11-25 /HPF / WBC 3-5 /HPF   Sq Epi: x / Non Sq Epi: Occasional / Bacteria: Occasional          I&O's Summary    2022 07:01  -  2022 07:00  --------------------------------------------------------  IN: 100 mL / OUT: 0 mL / NET: 100 mL        MEDICATIONS  (STANDING):  dexAMETHasone     Tablet   Oral   dexAMETHasone     Tablet 2 milliGRAM(s) Oral every 6 hours  dextrose 40% Gel 15 Gram(s) Oral once  dextrose 5%. 1000 milliLiter(s) (50 mL/Hr) IV Continuous <Continuous>  dextrose 5%. 1000 milliLiter(s) (100 mL/Hr) IV Continuous <Continuous>  dextrose 50% Injectable 25 Gram(s) IV Push once  dextrose 50% Injectable 12.5 Gram(s) IV Push once  dextrose 50% Injectable 25 Gram(s) IV Push once  doxazosin 4 milliGRAM(s) Oral at bedtime  fluconAZOLE   Tablet 200 milliGRAM(s) Oral once  glucagon  Injectable 1 milliGRAM(s) IntraMuscular once  insulin lispro (ADMELOG) corrective regimen sliding scale   SubCutaneous Before meals and at bedtime  melatonin 5 milliGRAM(s) Oral at bedtime  metoprolol tartrate 25 milliGRAM(s) Oral two times a day  pantoprazole  Injectable 40 milliGRAM(s) IV Push daily  polyethylene glycol 3350 17 Gram(s) Oral two times a day  senna 2 Tablet(s) Oral at bedtime  sodium chloride 1 Gram(s) Oral three times a day  sodium chloride 0.9%. 1000 milliLiter(s) (100 mL/Hr) IV Continuous <Continuous>  valproic acid 250 milliGRAM(s) Oral two times a day    MEDICATIONS  (PRN):  acetaminophen     Tablet .. 650 milliGRAM(s) Oral every 6 hours PRN Mild Pain (1 - 3)  hydrALAZINE Injectable 10 milliGRAM(s) IV Push every 2 hours PRN SBP > 140  labetalol Injectable 10 milliGRAM(s) IV Push every 2 hours PRN SBP > 140  OLANZapine 2.5 milliGRAM(s) Oral every 12 hours PRN agitation  ondansetron Injectable 4 milliGRAM(s) IV Push every 6 hours PRN Nausea and/or Vomiting

## 2022-02-03 NOTE — PROGRESS NOTE ADULT - THIS PATIENT HAS THE FOLLOWING CONDITION(S)/DIAGNOSES ON THIS ADMISSION:
None
tumor
tumor corpus callosum
Cerebral Edema
Cerebral Edema
Encephalopathy/Cerebral Edema
None
tumor corpus callosum
Encephalopathy/Cerebral Edema
brain lesion
Cerebral Edema
None
brain mass
corpus callosum mass
Encephalopathy/Cerebral Edema
Cerebral Edema
Encephalopathy/Cerebral Edema

## 2022-02-03 NOTE — PROGRESS NOTE ADULT - SUBJECTIVE AND OBJECTIVE BOX
HPI: Patient is a 82 y/o F PMH HTN transferred from [facility] [place] s/p trip & fall. Patient states she came home from work, didn't feel well and then fell and hit her head into her bathtub. Unsure if she syncopized. Denies LOC. Patient called EMS herself. Patient states she's been feeling "off" for 2 weeks but can't describe what was wrong with her. She noticed today that she felt unsteady on her feet. Currently denies headache, use of blood thinners, cancer history, visual disturbance, nausea/vomiting, fever/chills, weakness, numbness/tingling. 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     (2022 20:29)      INTERVAL HPI/OVERNIGHT EVENTS:  Patient seen/examined with daughter at bedside. Patient has NGT placed to suction since yesterday for abdominal distention, peritonitis. Distention and abdominal distention has significantly improved since yesterday. Patient is in NAD.     Vital Signs Last 24 Hrs  T(C): 37.9 (2022 11:16), Max: 37.9 (2022 11:16)  T(F): 100.3 (2022 11:16), Max: 100.3 (2022 11:16)  HR: 120 (2022 11:16) (105 - 120)  BP: 112/74 (2022 10:58) (100/59 - 126/75)  BP(mean): --  RR: 18 (2022 10:58) (18 - 19)  SpO2: 93% (2022 10:58) (92% - 97%)    PHYSICAL EXAM:    General: NAD, NGT to suction, coffee ground gastric fluid  Eyes: PERRLA, EOM intact  Neck: Supple and symmetrical, no JVD  CV: RRR, no m/r/g  Lungs: CTA b/l, no use of accessory muscles, no wheezes, rales, rhonchi  Skin: warm, dry, no rashes  Psych: normal mood/affect  Abdomen: Minimally distended and non tender  Extremities: no edema, cyanosis  Musculoskeletal: no obvious deformities    Detailed Neurologic Exam:    Mental status: The patient is somnolent but easy to arouse to voice. A & O X 2. The patient is able to name objects, follow commands, repeat sentences.    Cranial nerves: Pupils equal and react symmetrically to light. There is no visual field deficit to confrontation. Extraocular motion is full with no nystagmus. There is no ptosis. Facial sensation is intact. Facial musculature is symmetric. Palate elevates symmetrically. Shoulder shrug is normal. Tongue is midline.    Motor: There is normal bulk and tone.  There is no tremor.  Strength is 5/5 in the right arm and leg.   Strength is 5/5 in the left arm and leg.    Sensation: Intact to light touch in 4 extremities    Cerebellar: deferred    Gait : deferred        LABS:                        14.2   30.54 )-----------( 154      ( 2022 04:18 )             42.1     02-    139  |  104  |  42.4<H>  ----------------------------<  136<H>  4.9   |  22.0  |  0.71    Ca    9.2      2022 04:18  Phos  4.0     02-  Mg     2.1     02-    TPro  5.0<L>  /  Alb  2.1<L>  /  TBili  0.5  /  DBili  x   /  AST  22  /  ALT  27  /  AlkPhos  94  02-03    PT/INR - ( 2022 22:11 )   PT: 14.7 sec;   INR: 1.28 ratio      Urinalysis Basic - ( 2022 22:03 )    Color: Yellow / Appearance: Clear / S.015 / pH: x  Gluc: x / Ketone: Small  / Bili: Negative / Urobili: 4 mg/dL   Blood: x / Protein: 15 / Nitrite: Negative   Leuk Esterase: Negative / RBC: 11-25 /HPF / WBC 3-5 /HPF   Sq Epi: x / Non Sq Epi: Occasional / Bacteria: Occasional     @ 07:01  -  02-03 @ 07:00  --------------------------------------------------------  IN: 1750 mL / OUT: 125 mL / NET: 1625 mL      RADIOLOGY & ADDITIONAL TESTS:    < from: CT Abdomen and Pelvis No Cont (22 @ 18:50) >    IMPRESSION:  New moderate to large ascites which appears simple.  Large pneumoperitoneum without evident source.  Ascending colitis.  Small bilateral pleural effusions.  Atrophic left hepatic lobe as previously demonstrated    Findings discussed with Dr. Jimenez on 2022 at 8:20 PM  with read  back.        < from: CT Head No Cont (22 @ 04:15) >    IMPRESSION:  Interval disbursement of extensive left occipital parietal hemorrhage. No   new hemorrhage. Preliminary report provided by [facility] JOSE L GARCIA M.D.;[facility] RADIOLOGIST.    < end of copied text >

## 2022-02-03 NOTE — PROGRESS NOTE ADULT - ASSESSMENT
81yf Transferred from [facility], Denies LOC.    Pmhx: HTN,   Left cranio for bx for corpus callosum.  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   Pt c/o abd pain IR informed and a ct of the abd ordered  81 y.o. Female with hx of HTN, [place] [facility] [place], found with corpus callosal lesion, now s/p biopsy on 1/19/22, postoperative course complicated with hemorrhage in surgical bed/biopsy tract. Also noted on ct/ MRI abd/pelvis showed left hepatic mass. Patient is s/p liver biopsy procedure.     - POD 15 left crani with post op complication with hemorrhage in surgical bed/biopsy tract.   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 Abx, PPI    - Goals of Care discussion with family, MOLST in chart, DNR/DNI     81 y.o. Female with hx of HTN, transferred from [facility] s/p mechanical fall, found with corpus callosal lesion, now s/p biopsy on 1/19/22, postoperative course complicated with hemorrhage in surgical bed/biopsy tract. Also noted on ct/ MRI abd/pelvis showed left hepatic mass. Patient is s/p liver biopsy procedure.     - POD 15 left crani with post op complication with hemorrhage in surgical bed/biopsy tract.   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 Abx, PPI    - Goals of Care discussion with family, MOLST in chart, DNR/DNI    *** Received call from hospitalist, family decided later on for Comfort Measures Only, NGT removed, no antibiotics. At this time neurosurgery to sign off. As per primary team, patient to be transferred to [facility].

## 2022-02-03 NOTE — PROGRESS NOTE ADULT - SUBJECTIVE AND OBJECTIVE BOX
Chief Complaint: Patient is a 81y old  Female who presents with a chief complaint of Brain mass. Being seen in follow-up consultation for radiology finding of liver lesion.       Interval Events / Subjective: Patient seen and evaluated at bedside, reporting no complaints, no overnight events. S/p EGD/EUS guided liver biopsy showing Lesion of liver, Candidiasis of the esophagus, Gastritis, No Bleeding.  Pt was found to have abdominal distention, tenderness with x-ray showing distension of the stomach then NGT was placed for decompression. CT a/p revealed new moderate to large ascites which appears simple, large pneumoperitoneum without evident source and ascending colitis. Surgical team consulted and discussed with family whom at this time is opting out of any surgical intervention. This morning, Abdomen distention and tenderness improved. Patient remains confused and slightly lethargic.  Denies nausea, vomiting, abdominal pain, chest pain, shortness of breath, hematemesis, hematochezia, melena. NPO with NGT to lws. Leukocytosis with WBC 30.5, currently on Zosyn. LFTs normal.       REVIEW OF SYSTEMS:   Unable to obtain     MEDICATIONS:   MEDICATIONS  (STANDING):  dexAMETHasone     Tablet 2 milliGRAM(s) Oral every 6 hours  dexAMETHasone     Tablet   Oral   dextrose 40% Gel 15 Gram(s) Oral once  dextrose 5%. 1000 milliLiter(s) (50 mL/Hr) IV Continuous <Continuous>  dextrose 5%. 1000 milliLiter(s) (100 mL/Hr) IV Continuous <Continuous>  dextrose 50% Injectable 25 Gram(s) IV Push once  dextrose 50% Injectable 12.5 Gram(s) IV Push once  dextrose 50% Injectable 25 Gram(s) IV Push once  doxazosin 4 milliGRAM(s) Oral at bedtime  fluconAZOLE IVPB 100 milliGRAM(s) IV Intermittent every 24 hours  glucagon  Injectable 1 milliGRAM(s) IntraMuscular once  insulin lispro (ADMELOG) corrective regimen sliding scale   SubCutaneous Before meals and at bedtime  melatonin 5 milliGRAM(s) Oral at bedtime  metoprolol tartrate 25 milliGRAM(s) Oral two times a day  pantoprazole  Injectable 40 milliGRAM(s) IV Push every 12 hours  piperacillin/tazobactam IVPB.. 3.375 Gram(s) IV Intermittent every 8 hours  polyethylene glycol 3350 17 Gram(s) Oral two times a day  senna 2 Tablet(s) Oral at bedtime  sodium chloride 1 Gram(s) Oral three times a day  sodium chloride 0.9%. 1000 milliLiter(s) (100 mL/Hr) IV Continuous <Continuous>  valproic acid 250 milliGRAM(s) Oral two times a day    MEDICATIONS  (PRN):  acetaminophen     Tablet .. 650 milliGRAM(s) Oral every 6 hours PRN Mild Pain (1 - 3)  hydrALAZINE Injectable 10 milliGRAM(s) IV Push every 2 hours PRN SBP > 140  labetalol Injectable 10 milliGRAM(s) IV Push every 2 hours PRN SBP > 140  morphine  - Injectable 2 milliGRAM(s) IV Push every 6 hours PRN Moderate Pain (4 - 6)  OLANZapine 2.5 milliGRAM(s) Oral every 12 hours PRN agitation  ondansetron Injectable 4 milliGRAM(s) IV Push every 6 hours PRN Nausea and/or Vomiting      ALLERGIES:   Allergies    Bananas (Angioedema; Anaphylaxis; Short breath)  Kiwi (Angioedema; Anaphylaxis; Short breath)  latex (Unknown)  Rock (Angioedema; Anaphylaxis; Short breath)  No Known Drug Allergies  strawberry (Angioedema; Anaphylaxis; Short breath)    Intolerances        VITAL SIGNS:   Vital Signs Last 24 Hrs  T(C): 36.8 (2022 04:55), Max: 36.8 (2022 04:55)  T(F): 98.3 (2022 04:55), Max: 98.3 (2022 04:55)  HR: 105 (2022 04:55) (105 - 118)  BP: 126/75 (2022 04:55) (100/59 - 126/75)  BP(mean): --  RR: 19 (2022 04:55) (18 - 19)  SpO2: 92% (2022 04:55) (92% - 97%)  I&O's Summary    2022 07:01  -  2022 07:00  --------------------------------------------------------  IN: 500 mL / OUT: 25 mL / NET: 475 mL        PHYSICAL EXAM:   GENERAL:  Ill appearing female, with NGT to lws, in No acute distress  HEENT:  NC/AT,  conjunctivae clear, sclera -anicteric  CHEST:  Full & symmetric excursion, no increased effort, breath sounds clear  HEART:  Regular rhythm, S1, S2, no murmur/rub/S3/S4,  no edema  ABDOMEN:  Softly distended, Tenderness on palpitation, normoactive bowel sounds.  EXTREMITIES: No cyanosis, clubbing or edema  SKIN:  No rash/erythema/ecchymoses/petechiae/wounds/abscess/warm/dry  NEURO:  Slightly lethargic       LABS:  CBC Full  -  ( 2022 04:18 )  WBC Count : 30.54 K/uL  RBC Count : 4.59 M/uL  Hemoglobin : 14.2 g/dL  Hematocrit : 42.1 %  Platelet Count - Automated : 154 K/uL  Mean Cell Volume : 91.7 fl  Mean Cell Hemoglobin : 30.9 pg  Mean Cell Hemoglobin Concentration : 33.7 gm/dL  Auto Neutrophil # : 27.61 K/uL  Auto Lymphocyte # : 0.27 K/uL  Auto Monocyte # : 0.27 K/uL  Auto Eosinophil # : 0.00 K/uL  Auto Basophil # : 0.00 K/uL  Auto Neutrophil % : 73.9 %  Auto Lymphocyte % : 0.9 %  Auto Monocyte % : 0.9 %  Auto Eosinophil % : 0.0 %  Auto Basophil % : 0.0 %    -    139  |  104  |  42.4<H>  ----------------------------<  136<H>  4.9   |  22.0  |  0.71    Ca    9.2      2022 04:18  Phos  4.0     02-02  Mg     2.1     02-    TPro  5.0<L>  /  Alb  2.1<L>  /  TBili  0.5  /  DBili  x   /  AST  22  /  ALT  27  /  AlkPhos  94  02-03    LIVER FUNCTIONS - ( 2022 04:18 )  Alb: 2.1 g/dL / Pro: 5.0 g/dL / ALK PHOS: 94 U/L / ALT: 27 U/L / AST: 22 U/L / GGT: x           PT/INR - ( 2022 22:11 )   PT: 14.7 sec;   INR: 1.28 ratio           Urinalysis Basic - ( 2022 22:03 )    Color: Yellow / Appearance: Clear / S.015 / pH: x  Gluc: x / Ketone: Small  / Bili: Negative / Urobili: 4 mg/dL   Blood: x / Protein: 15 / Nitrite: Negative   Leuk Esterase: Negative / RBC: 11-25 /HPF / WBC 3-5 /HPF   Sq Epi: x / Non Sq Epi: Occasional / Bacteria: Occasional        Culture - Other (collected 2022 14:52)  Source: .Other Esophageal [place]  Final Report (2022 16:42):    Culture yields growth of greater than 3 colony types of    Call client services within 7 days if further workup is clinically    indicated.        RADIOLOGY & ADDITIONAL STUDIES (The following images were personally reviewed):    ACC: [id number] EXAM:  CT ABDOMEN AND PELVIS                          PROCEDURE DATE:  2022          INTERPRETATION:  CLINICAL INFORMATION: 81-year-old female status post   liver biopsy on 2022 with abdominal pain    COMPARISON: CT scan 2022    CONTRAST/COMPLICATIONS:  IV Contrast: NONE  Oral Contrast: NONE  Complications: None reported at time of study completion    PROCEDURE:  CT of the Abdomen and Pelvis was performed.  Sagittal and coronal reformats were performed.    FINDINGS:  LOWER CHEST: Small bilateral pleural effusions, larger on the right.   Coronary artery calcification.    LIVER: Cysts. Atrophic left lobe.  BILE DUCTS: Normal caliber.  GALLBLADDER: Within normal limits.  SPLEEN: Within normal limits.  PANCREAS: Within normal limits.  ADRENALS: Within normal limits.  KIDNEYS/URETERS: Within normal limits.    BLADDER: Indwelling Orozco catheter.  REPRODUCTIVE ORGANS: Hysterectomy.    BOWEL: No bowel obstruction. Appendix not visualized. Distended stomach   and small bowel, likely adynamic ileus. Esophagogastric tube ending in   the distal stomach. Multiple colonic diverticula. Mural thickening   ascending colon consistent with colitis.  PERITONEUM: New moderate to large ascites which does not measure in the  range of hemoperitoneum. Large pneumoperitoneum.  VESSELS: Within normal limits.  RETROPERITONEUM/LYMPH NODES: No lymphadenopathy.  ABDOMINAL WALL: Within normal limits.  BONES: Degenerative change.    IMPRESSION:  New moderate to large ascites which appears simple.  Large pneumoperitoneum without evident source.  Ascending colitis.  Small bilateral pleural effusions.  Atrophic left hepatic lobe as previously demonstrated    Findings discussed with Dr. Jimenez on 2022 at 8:20 PM  with read  back.    --- End of Report ---            NADIRA MCNAIR MD; Attending Radiologist  This document has been electronically signed. 2022  8:20PM        ACC: [id number] EXAM:  XR KUB 1 VIEW                          PROCEDURE DATE:  2022          INTERPRETATION:  AP supine and abdominal study. Patient has abdominal   distention.    Stomach is quite distended with air.    There is also bowel distention the mid abdomen possibly in the transverse   colon.    IMPRESSION: Bowel distention as above.    --- End of Report ---            SUSY SENIOR MD; Attending Radiologist  This document has been electronically signed. 2022 11:57AM

## 2022-02-03 NOTE — PROGRESS NOTE ADULT - ATTENDING COMMENTS
Pending further oncology plans/discussion.   Patient seen and examined, discussed patient with Dr. Harden and agree with recommendations.
Pending onc decisions/planning.   Patient seen and examined, discussed patient with Dr. Harden and agree with recommendations.
Patient with post-op complications that will likely require rehab upon DC.    Will continue to follow. Rehab recommendations are dependent on how functional progress changes as well as how patient continues to participate and tolerate therapeutic interventions, which may change. Recommend ongoing mobilization by staff to maintain cardiopulmonary function and prevention of secondary complications related to debility. Discussed with rehab team.       Patient seen and examined, discussed patient with Dr. Harden and agree with recommendations.
AJAY Attg:    see above    patient seen and examined    agree with exam and plan as above  anticipate possible brain biopsy Wednesday per Dr. Frausto
AJAY Attg:    see above    patient seen and examined  confused    agree with exam and plan as above  anticipate brain biopsy next Wednesday per Dr. Frausto
I personally examined the pt and talked to her and her daughter over the phone. I explained the Findings of the MRI and went through possible diagnoses. I explained the need of a brain tumor biopsy in order to have a definitive diagnosis and determine adjuvant treatment plans. I went through the procedure of an open biopsy in details. I explained the risks of the surgery including but not limited to infection, hemorrhage, need for another surgery, right side weakness, speech issues, seizures, stroke, MI, DVT, PE. The pt and the daughter understood risks and the daughter gave consent.
NSGY Attg:    see above    patient seen and examined    agree with exam and plan as above
Rehab - Recommend ACUTE inpatient rehabilitation for the functional deficits consisting of 3 hours of therapy/day & 24 hour RN/daily PMR physician for comorbid medical management. Will continue to follow for ongoing rehab needs and recommendations. Patient will be able to tolerate 3 hours a day.    Continue bedside therapy as well as OOB throughout the day with mobilization throughout the day with staff to maintain cardiopulmonary function and prevention of secondary complications related to debility.       Patient seen and examined, discussed patient with Dr. Harden and agree with recommendations.
82 y/o F PMH HTN transferred from [facility] [place] & fall at home with +head strike. No LOC. CTH at [facility] reveals Butterfly type hyperattenuating lesion involving the corpus callosum concerning for malignancy GI following for liver lesion. Reviewed CT, MRI shows Left hepatic lobe atrophy and mild intrahepatic dilatation involving segments 2 and 3. Suspicion for stricturing mass at the junction of the medial and lateral left lobes. 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   Possibly Cholangiocarcinoma? AFP neg. Will discuss imaging with rads and advanced GI and discuss next steps     Plan discussed with NP
LABS, IMAGING REVIEWED, plan discussed with NP
NSGY Attg:    see above    patient seen and examined    agree with exam and plan as above
NSGY Attg:    see above    patient seen and examined    agree with exam and plan as above
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 Oncology team requested liver biopsy. Hold Lovenox. Make patient n.p.o. after midnight. To be discussed with the IR team procedure possible tomorrow. Patient made aware of the recommendation. She is agreeable for the procedure.
Plan reviewed with nurse practitioner.  I reviewed MRI images.  Lesion is likely an HCC but I will need to review the images with radiology.  -LFTs and INR normal.  Patient is optimized from a GI standpoint to have corpus callosum mass biopsied.  -Awaiting AFP level  -Patient will need a hepatology follow-up at [address] with Dr. hamilton to evaluate if patient is candidate for chemoembolization
Seen and examined with nurse practitioner.  Patient here with corpus callosum mass.  Biopsy results pending.  Patient had cerebral bleed at the biopsy site.  I discussed biopsy of liver lesion with interventional radiologist Dr. Toney.  He suggested waiting untill the cerebral biopsy results are back.  -Diet as per neurosurgery
labs, imaging reviewed, agree with above
80 y/o F PMH HTN [place] [facility] [place] at home with +head strike. No LOC. [place] [facility] [place] [place] Butterfly type hyperattenuating lesion involving the corpus callosum concerning for malignancy GI following for liver lesion. Reviewed CT, MRI shows Left hepatic lobe atrophy and mild intrahepatic dilatation involving segments 2 and 3. Suspicion for stricturing mass at the junction of the medial and lateral left lobes. 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   Possibly Cholangiocarcinoma? AFP neg. Lesion not amenable to EUS guided biopsy, recommend IR guided biopsy.     Plan discussed with NP
Patient seen and examined.  Patient was noted to have abdominal distention yesterday.  NG tube was placed.  CT abdominal revealed pneumoperitoneum.  There is a concern for viscus perforation.  Antibiotics were started.  On PPI.  Appreciate surgical team evaluation.  Family now wants no surgical intervention.  Patient was evaluated this morning.  Abdominal distention and discomfort is better.  Still has no bowel sounds.  Had extensive discussion with the patient daughter at the bedside.  We will still recommend to continue IV antibiotic, IV PPI and continue with NG tube to suction.  If patient starts to improve, eventually should do a Gastrografin study through the NG tube.  However if there is further deterioration in the patient's symptoms, then further assessment with palliative care and comfort care and will brought up.
I evaluated this pt. with my NP and agree with the above assessment and management plan. Brain biopsy + for B-cell lymphoma which has likely spread to the liver. Therefore liver biopsy is not currently necessary. Further management of her B-cell lymphoma as per Oncology. No plans or need for continued GI f/u. Signing off. Reconsult as needed. Thank you.
Pt was seen and examined with NP     I agree with assessment  and plan    Patient with abdominal distension. Does not recall last BM   Xray now showing  distension of stomach - NGT should be placed for LIWS  Pt with  candida esophagitis seen on  EGD/EUS

## 2022-02-03 NOTE — CONSULT NOTE ADULT - CONSULT REQUESTED DATE/TIME
02-Feb-2022 21:36
18-Jan-2022 09:37
28-Jan-2022 10:41
19-Jan-2022 13:38
03-Feb-2022
29-Jan-2022
19-Jan-2022 15:00
15-Larry-2022 12:08
14-Jan-2022 10:09
15-Larry-2022 17:19

## 2022-02-03 NOTE — GOALS OF CARE CONVERSATION - ADVANCED CARE PLANNING - CONVERSATION DETAILS
I saw patient with family at bedside this AM , Initially they wanted DNR/DNI with medical management with continuation of NGT to suction, antibiotics, PPI, NPO status . However after meeting with palliative care , Daughter ( HCP Gm Cleaning ) said she " changed her mind after discussion with siblings . Family  wishes to pursue entirely comfort care for now (  NO IVF , NO ANTIBIOTICS , NO STEROIDS , NO BLOOD DRAW )  Family understand patient will likely  if all meds are taken off and  they agree with the plan . They would also like to pursue hospice for now   Discussed with palliative care  NP Danette as well as RN and Nurse wil [place] at bedside .   Will stop ALL MEDS excepts comforts meds like morphine , lorazepam and laxatives   HCP Gm agree with the plan   PT IS DNR/DNR COMFORT  MEASURES  ONLY I saw patient with family at bedside this AM , Initially they wanted DNR/DNI with medical management with continuation of NGT to suction, antibiotics, PPI, NPO status . However after meeting with palliative care , Daughter ( HCP Gm Cleaning ) said she " changed her mind after discussion with siblings . Family  wishes to pursue entirely comfort care for now (  NO IVF , NO ANTIBIOTICS , NO STEROIDS , NO BLOOD DRAW )  Family understand patient will likely  if all meds are taken off and  they agree with the plan . They would also like to pursue hospice for now   Discussed with palliative care  NP Danette as well as RN and Nurse manager Pola at bedside .   Will stop ALL MEDS excepts comforts meds like morphine , lorazepam and laxatives   Risk and benefits explained - HCP Gm agree with the plan   PT IS DNR/DNR COMFORT  MEASURES  ONLY

## 2022-02-03 NOTE — PROGRESS NOTE ADULT - SUBJECTIVE AND OBJECTIVE BOX
Transfer of Service note       Patient is a 81y old  Female who presents with a chief complaint of Brain mass (2022 22:36)      HPI: 82yo Female with hx of HTN, transferred from [facility] after s/p mechanical fall, with ongoing sx of feeling unwell x 1 month, admitted to the NEURO sx service with corpus callosal lesion, now s/p craniotomy for biopsy 22, postoperative course complicated with hemorrhage in surgical bed/biopsy tract. Also noted on ct/ MRI AP showed mild MS LADs, and left hepatic mass. MRI abd showed left hepatic lobe atrophy and mild intrahepatic dilatation involving segments 2 and 3. Suspicion for stricturing mass at the junction of the medial and lateral left lobes. Pt s/p EGD/EUS guided liver biopsy showing Lesion of liver, Candidiasis of the esophagus, Gastritis, started on Diflucan. Pt was found to have abdominal distension subsequently exam yesterday with xray showing distension of the stomach then NGT was placed for decompression. CT a/p was ordered which came back this evening pertinent for new moderate to large ascites which appears simple,  large pneumoperitoneum without evident source and ascending colitis. Surgical consult was placed by primary neurosurgical team for further management. As per note surgery team spoke with family extensively regarding surgical intervention and family decided to not move forward with any surgical interventions as they considering comfort/palliative measures. Medicine team called for transfer of service to medicine. multiple goal of care discussion noted in the chart, pt was made DNR/DNI by Neurosurgery team, spoke with Neurosurgery ALEX Devine who noted that family is leaning toward comfort measures only pending discussion with palliative care team, for now family is ok with medical management.       INTERVAL HPI/OVERNIGHT EVENTS: Pt is seen and examined at bedside, pt is alert to place and self. Pt offered no acute complaints at this time, states she does not have pain but states that "I want to wake up from all thses nightmares" ROS is limited as pt is confused at times. Exam pertinent for abdominal distension and tenderness/guarding, labs reviewed noted for leukocytosis of 29K(15k previously), vitals pertinent for tachycardia, BP stable. blood cultures sent and pt started on IV zosyn.           REVIEW OF SYSTEMS:  limited as above   GASTROINTESTINAL: denies abdominal pain. No nausea, vomiting    MEDICATIONS:  acetaminophen     Tablet .. 650 milliGRAM(s) Oral every 6 hours PRN  dexAMETHasone     Tablet   Oral   dexAMETHasone     Tablet 2 milliGRAM(s) Oral every 6 hours  dextrose 40% Gel 15 Gram(s) Oral once  dextrose 5%. 1000 milliLiter(s) IV Continuous <Continuous>  dextrose 5%. 1000 milliLiter(s) IV Continuous <Continuous>  dextrose 50% Injectable 25 Gram(s) IV Push once  dextrose 50% Injectable 12.5 Gram(s) IV Push once  dextrose 50% Injectable 25 Gram(s) IV Push once  doxazosin 4 milliGRAM(s) Oral at bedtime  fluconAZOLE   Tablet 100 milliGRAM(s) Oral daily  glucagon  Injectable 1 milliGRAM(s) IntraMuscular once  hydrALAZINE Injectable 10 milliGRAM(s) IV Push every 2 hours PRN  insulin lispro (ADMELOG) corrective regimen sliding scale   SubCutaneous Before meals and at bedtime  labetalol Injectable 10 milliGRAM(s) IV Push every 2 hours PRN  melatonin 5 milliGRAM(s) Oral at bedtime  metoprolol tartrate 25 milliGRAM(s) Oral two times a day  OLANZapine 2.5 milliGRAM(s) Oral every 12 hours PRN  ondansetron Injectable 4 milliGRAM(s) IV Push every 6 hours PRN  pantoprazole  Injectable 40 milliGRAM(s) IV Push daily  piperacillin/tazobactam IVPB.      piperacillin/tazobactam IVPB. 3.375 Gram(s) IV Intermittent once  piperacillin/tazobactam IVPB. 3.375 Gram(s) IV Intermittent once  piperacillin/tazobactam IVPB.. 3.375 Gram(s) IV Intermittent every 8 hours  polyethylene glycol 3350 17 Gram(s) Oral two times a day  senna 2 Tablet(s) Oral at bedtime  sodium chloride 1 Gram(s) Oral three times a day  sodium chloride 0.9%. 1000 milliLiter(s) IV Continuous <Continuous>  valproic acid 250 milliGRAM(s) Oral two times a day      T(C): 34.6 (22 @ 00:24), Max: 36.4 (22 @ 04:20)  HR: 106 (22 @ 00:24) (98 - 118)  BP: 102/81 (22 @ 00:24) (100/59 - 105/64)  RR: 18 (22 @ 00:24) (18 - 18)  SpO2: 94% (22 @ 00:24) (92% - 97%)  Wt(kg): --Vital Signs Last 24 Hrs  T(C): 34.6 (2022 00:24), Max: 36.4 (2022 04:20)  T(F): 94.3 (2022 00:24), Max: 97.6 (2022 04:20)  HR: 106 (2022 00:24) (98 - 118)  BP: 102/81 (2022 00:24) (100/59 - 105/64)  BP(mean): --  RR: 18 (2022 00:24) (18 - 18)  SpO2: 94% (2022 00:24) (92% - 97%)    PHYSICAL EXAM:  GENERAL: appears ill, no acute distress, confused at times  HEAD:  Atraumatic, Normocephalic  EYES: EOMI, PERRLA, conjunctiva and sclera clear  ENMT:  Moist mucous membranes  NECK: Supple, No JVD,  CHEST/LUNG: Clear to auscultation bilaterally; No rales, rhonchi, wheezing, or rubs  HEART: tachy nd normal rhythm; No murmurs, rubs, or gallops  ABDOMEN: firm, distended, +tenderness, guarding, decreased bowel sounds   NEURO: Alert & Oriented to self and place   EXTREMITIES: No LE edema, no calf tenderness  SKIN: No rashes, multiple UE bruises     Consultant(s) Notes Reviewed.       LABS:                        13.7   29.02 )-----------( 139      ( 2022 22:11 )             39.8     02    136  |  105  |  41.0<H>  ----------------------------<  149<H>  5.2   |  17.0<L>  |  0.72    Ca    8.8      2022 22:11  Phos  4.0       Mg     2.1         TPro  4.6<L>  /  Alb  1.7<L>  /  TBili  0.5  /  DBili  x   /  AST  24  /  ALT  26  /  AlkPhos  86      PT/INR - ( 2022 22:11 )   PT: 14.7 sec;   INR: 1.28 ratio         PTT - ( 2022 07:42 )  PTT:30.6 sec  Urinalysis Basic - ( 2022 22:03 )    Color: Yellow / Appearance: Clear / S.015 / pH: x  Gluc: x / Ketone: Small  / Bili: Negative / Urobili: 4 mg/dL   Blood: x / Protein: 15 / Nitrite: Negative   Leuk Esterase: Negative / RBC: 11-25 /HPF / WBC 3-5 /HPF   Sq Epi: x / Non Sq Epi: Occasional / Bacteria: Occasional      CAPILLARY BLOOD GLUCOSE      POCT Blood Glucose.: 124 mg/dL (2022 01:14)  POCT Blood Glucose.: 135 mg/dL (2022 16:48)  POCT Blood Glucose.: 102 mg/dL (2022 11:56)  POCT Blood Glucose.: 101 mg/dL (2022 07:37)        Urinalysis Basic - ( 2022 22:03 )    Color: Yellow / Appearance: Clear / S.015 / pH: x  Gluc: x / Ketone: Small  / Bili: Negative / Urobili: 4 mg/dL   Blood: x / Protein: 15 / Nitrite: Negative   Leuk Esterase: Negative / RBC: 11-25 /HPF / WBC 3-5 /HPF   Sq Epi: x / Non Sq Epi: Occasional / Bacteria: Occasional      < from: CT Abdomen and Pelvis No Cont (22 @ 18:50) >  ACC: [id number] EXAM:  CT ABDOMEN AND PELVIS                          PROCEDURE DATE:  2022        INTERPRETATION:  CLINICAL INFORMATION: 81-year-old female status post   liver biopsy on 2022 with abdominal pain    COMPARISON: CT scan 2022    CONTRAST/COMPLICATIONS:  IV Contrast: NONE  Oral Contrast: NONE  Complications: None reported at time of study completion    PROCEDURE:  CT of the Abdomen and Pelvis was performed.  Sagittal and coronal reformats were performed.    FINDINGS:  LOWER CHEST: Small bilateral pleural effusions, larger on the right.   Coronary artery calcification.    LIVER: Cysts. Atrophic left lobe.  BILE DUCTS: Normal caliber.  GALLBLADDER: Within normal limits.  SPLEEN: Within normal limits.  PANCREAS: Within normal limits.  ADRENALS: Within normal limits.  KIDNEYS/URETERS: Within normal limits.    BLADDER: Indwelling Orozco catheter.  REPRODUCTIVE ORGANS: Hysterectomy.    BOWEL: No bowel obstruction. Appendix not visualized. Distended stomach   and small bowel, likely adynamic ileus. Esophagogastric tube ending in   the distal stomach. Multiple colonic diverticula. Mural thickening   ascending colon consistent with colitis.  PERITONEUM: New moderate to large ascites which does not measure in the  range of hemoperitoneum. Large pneumoperitoneum.  VESSELS: Within normal limits.  RETROPERITONEUM/LYMPH NODES: No lymphadenopathy.  ABDOMINAL WALL: Within normal limits.  BONES: Degenerative change.    IMPRESSION:  New moderate to large ascites which appears simple.  Large pneumoperitoneum without evident source.  Ascending colitis.  Small bilateral pleural effusions.  Atrophic left hepatic lobe as previously demonstrated    Findings discussed with Dr. Jimenez on 2022 at 8:20 PM  with read  back.    --- End of Report ---      NADIRA MCNAIR MD; Attending Radiologist  This document has been electronically signed. 2022  8:20PM    < end of copied text >

## 2022-02-03 NOTE — CONSULT NOTE ADULT - PROVIDER SPECIALTY LIST ADULT
Hospitalist
Surgery
Brain Injury Medicine
Heme/Onc
NSICU
Trauma Surgery
Hospitalist
Palliative Care
Neurology
Gastroenterology

## 2022-02-03 NOTE — CONSULT NOTE ADULT - CONSULT REQUESTED BY NAME
maninder
Neuro surgery
Neurosurgery
Dr. Frausto
Dr. Rosales
Dr. Vargas
Dr French
Neurosurgery

## 2022-02-03 NOTE — PROGRESS NOTE ADULT - SUBJECTIVE AND OBJECTIVE BOX
[facility] Division of Hospital Medicine    Chief Complaint:      SUBJECTIVE / OVERNIGHT EVENTS:    Patient denies chest pain, SOB, abd pain, N/V, fever, chills, dysuria or any other complaints. All remainder ROS negative.     MEDICATIONS  (STANDING):  dexAMETHasone  Injectable   IV Push   dexAMETHasone  Injectable 2 milliGRAM(s) IV Push every 6 hours  dextrose 40% Gel 15 Gram(s) Oral once  dextrose 5%. 1000 milliLiter(s) (50 mL/Hr) IV Continuous <Continuous>  dextrose 5%. 1000 milliLiter(s) (100 mL/Hr) IV Continuous <Continuous>  dextrose 50% Injectable 25 Gram(s) IV Push once  dextrose 50% Injectable 12.5 Gram(s) IV Push once  dextrose 50% Injectable 25 Gram(s) IV Push once  doxazosin 4 milliGRAM(s) Oral at bedtime  fluconAZOLE IVPB 100 milliGRAM(s) IV Intermittent every 24 hours  glucagon  Injectable 1 milliGRAM(s) IntraMuscular once  insulin lispro (ADMELOG) corrective regimen sliding scale   SubCutaneous Before meals and at bedtime  melatonin 5 milliGRAM(s) Oral at bedtime  metoprolol tartrate 25 milliGRAM(s) Oral two times a day  pantoprazole  Injectable 40 milliGRAM(s) IV Push every 12 hours  piperacillin/tazobactam IVPB.. 3.375 Gram(s) IV Intermittent every 8 hours  polyethylene glycol 3350 17 Gram(s) Oral two times a day  senna 2 Tablet(s) Oral at bedtime  sodium chloride 1 Gram(s) Oral three times a day  sodium chloride 0.9%. 1000 milliLiter(s) (100 mL/Hr) IV Continuous <Continuous>  valproic acid 250 milliGRAM(s) Oral two times a day    MEDICATIONS  (PRN):  acetaminophen     Tablet .. 650 milliGRAM(s) Oral every 6 hours PRN Mild Pain (1 - 3)  acetaminophen   IVPB .. 1000 milliGRAM(s) IV Intermittent once PRN Temp greater or equal to 38C (100.4F)  hydrALAZINE Injectable 10 milliGRAM(s) IV Push every 2 hours PRN SBP > 140  labetalol Injectable 10 milliGRAM(s) IV Push every 2 hours PRN SBP > 140  morphine  - Injectable 2 milliGRAM(s) IV Push every 6 hours PRN Moderate Pain (4 - 6)  OLANZapine 2.5 milliGRAM(s) Oral every 12 hours PRN agitation  ondansetron Injectable 4 milliGRAM(s) IV Push every 6 hours PRN Nausea and/or Vomiting        I&O's Summary    2022 07:01  -  2022 07:00  --------------------------------------------------------  IN: 1750 mL / OUT: 125 mL / NET: 1625 mL        PHYSICAL EXAM:  Vital Signs Last 24 Hrs  T(C): 37.9 (2022 11:16), Max: 37.9 (2022 11:16)  T(F): 100.3 (2022 11:16), Max: 100.3 (2022 11:16)  HR: 120 (2022 11:16) (105 - 120)  BP: 112/74 (2022 10:58) (102/81 - 126/75)  BP(mean): --  RR: 18 (2022 10:58) (18 - 19)  SpO2: 93% (2022 10:58) (92% - 94%)        CONSTITUTIONAL: NAD, well-developed, well-groomed  ENMT: Moist oral mucosa, no pharyngeal injection or exudates; normal dentition  RESPIRATORY: Normal respiratory effort; lungs are clear to auscultation bilaterally  CARDIOVASCULAR: Regular rate and rhythm, normal S1 and S2, no murmur/rub/gallop; No lower extremity edema; Peripheral pulses are 2+ bilaterally  ABDOMEN: Nontender to palpation, normoactive bowel sounds, no rebound/guarding; No hepatosplenomegaly  MUSCLOSKELETAL:  Normal gait; no clubbing or cyanosis of digits; no joint swelling or tenderness to palpation  PSYCH: A+O to person, place, and time; affect appropriate  NEUROLOGY: CN 2-12 are intact and symmetric; no gross sensory deficits;   SKIN: No rashes; no palpable lesions    LABS:                        14.2   30.54 )-----------( 154      ( 2022 04:18 )             42.1     02-03    139  |  104  |  42.4<H>  ----------------------------<  136<H>  4.9   |  22.0  |  0.71    Ca    9.2      2022 04:18  Phos  4.0     02-  Mg     2.1         TPro  5.0<L>  /  Alb  2.1<L>  /  TBili  0.5  /  DBili  x   /  AST  22  /  ALT  27  /  AlkPhos  94      PT/INR - ( 2022 22:11 )   PT: 14.7 sec;   INR: 1.28 ratio               Urinalysis Basic - ( 2022 22:03 )    Color: Yellow / Appearance: Clear / S.015 / pH: x  Gluc: x / Ketone: Small  / Bili: Negative / Urobili: 4 mg/dL   Blood: x / Protein: 15 / Nitrite: Negative   Leuk Esterase: Negative / RBC: 11-25 /HPF / WBC 3-5 /HPF   Sq Epi: x / Non Sq Epi: Occasional / Bacteria: Occasional        Culture - Other (collected 2022 14:52)  Source: .[facility]  Final Report (2022 16:42):    Culture yields growth of greater than 3 colony types of    Call client services within 7 days if further workup is clinically    indicated.      CAPILLARY BLOOD GLUCOSE      POCT Blood Glucose.: 126 mg/dL (2022 07:16)  POCT Blood Glucose.: 124 mg/dL (2022 01:14)  POCT Blood Glucose.: 135 mg/dL (2022 16:48)        RADIOLOGY & ADDITIONAL TESTS:  Results Reviewed:   Imaging Personally Reviewed:  Electrocardiogram Personally Reviewed:                                               [facility] Division of Hospital Medicine    Chief Complaint:      SUBJECTIVE / OVERNIGHT EVENTS:    Patient denies chest pain, SOB, abd pain, N/V, fever, chills, dysuria or any other complaints. All remainder ROS negative.     MEDICATIONS  (STANDING):  dexAMETHasone  Injectable   IV Push   dexAMETHasone  Injectable 2 milliGRAM(s) IV Push every 6 hours  dextrose 40% Gel 15 Gram(s) Oral once  dextrose 5%. 1000 milliLiter(s) (50 mL/Hr) IV Continuous <Continuous>  dextrose 5%. 1000 milliLiter(s) (100 mL/Hr) IV Continuous <Continuous>  dextrose 50% Injectable 25 Gram(s) IV Push once  dextrose 50% Injectable 12.5 Gram(s) IV Push once  dextrose 50% Injectable 25 Gram(s) IV Push once  doxazosin 4 milliGRAM(s) Oral at bedtime  fluconAZOLE IVPB 100 milliGRAM(s) IV Intermittent every 24 hours  glucagon  Injectable 1 milliGRAM(s) IntraMuscular once  insulin lispro (ADMELOG) corrective regimen sliding scale   SubCutaneous Before meals and at bedtime  melatonin 5 milliGRAM(s) Oral at bedtime  metoprolol tartrate 25 milliGRAM(s) Oral two times a day  pantoprazole  Injectable 40 milliGRAM(s) IV Push every 12 hours  piperacillin/tazobactam IVPB.. 3.375 Gram(s) IV Intermittent every 8 hours  polyethylene glycol 3350 17 Gram(s) Oral two times a day  senna 2 Tablet(s) Oral at bedtime  sodium chloride 1 Gram(s) Oral three times a day  sodium chloride 0.9%. 1000 milliLiter(s) (100 mL/Hr) IV Continuous <Continuous>  valproic acid 250 milliGRAM(s) Oral two times a day    MEDICATIONS  (PRN):  acetaminophen     Tablet .. 650 milliGRAM(s) Oral every 6 hours PRN Mild Pain (1 - 3)  acetaminophen   IVPB .. 1000 milliGRAM(s) IV Intermittent once PRN Temp greater or equal to 38C (100.4F)  hydrALAZINE Injectable 10 milliGRAM(s) IV Push every 2 hours PRN SBP > 140  labetalol Injectable 10 milliGRAM(s) IV Push every 2 hours PRN SBP > 140  morphine  - Injectable 2 milliGRAM(s) IV Push every 6 hours PRN Moderate Pain (4 - 6)  OLANZapine 2.5 milliGRAM(s) Oral every 12 hours PRN agitation  ondansetron Injectable 4 milliGRAM(s) IV Push every 6 hours PRN Nausea and/or Vomiting        I&O's Summary    2022 07:01  -  2022 07:00  --------------------------------------------------------  IN: 1750 mL / OUT: 125 mL / NET: 1625 mL        PHYSICAL EXAM:  Vital Signs Last 24 Hrs  T(C): 37.9 (2022 11:16), Max: 37.9 (2022 11:16)  T(F): 100.3 (2022 11:16), Max: 100.3 (2022 11:16)  HR: 120 (2022 11:16) (105 - 120)  BP: 112/74 (2022 10:58) (102/81 - 126/75)  BP(mean): --  RR: 18 (2022 10:58) (18 - 19)  SpO2: 93% (2022 10:58) (92% - 94%)        CONSTITUTIONAL: NAD, well-developed, well-groomed  ENMT: Moist oral mucosa, no pharyngeal injection or exudates; normal dentition  RESPIRATORY: Normal respiratory effort; lungs are clear to auscultation bilaterally  CARDIOVASCULAR: Regular rate and rhythm, normal S1 and S2, no murmur/rub/gallop; No lower extremity edema; Peripheral pulses are 2+ bilaterally  ABDOMEN: Nontender to palpation, normoactive bowel sounds, no rebound/guarding; No hepatosplenomegaly  MUSCLOSKELETAL:  Normal gait; no clubbing or cyanosis of digits; no joint swelling or tenderness to palpation  PSYCH: A+O to person, place, and time; affect appropriate  NEUROLOGY: CN 2-12 are intact and symmetric; no gross sensory deficits;   SKIN: No rashes; no palpable lesions    LABS:                        14.2   30.54 )-----------( 154      ( 2022 04:18 )             42.1     02-03    139  |  104  |  42.4<H>  ----------------------------<  136<H>  4.9   |  22.0  |  0.71    Ca    9.2      2022 04:18  Phos  4.0     02  Mg     2.1         TPro  5.0<L>  /  Alb  2.1<L>  /  TBili  0.5  /  DBili  x   /  AST  22  /  ALT  27  /  AlkPhos  94      PT/INR - ( 2022 22:11 )   PT: 14.7 sec;   INR: 1.28 ratio               Urinalysis Basic - ( 2022 22:03 )    Color: Yellow / Appearance: Clear / S.015 / pH: x  Gluc: x / Ketone: Small  / Bili: Negative / Urobili: 4 mg/dL   Blood: x / Protein: 15 / Nitrite: Negative   Leuk Esterase: Negative / RBC: 11-25 /HPF / WBC 3-5 /HPF   Sq Epi: x / Non Sq Epi: Occasional / Bacteria: Occasional        Culture - Other (collected 2022 14:52)  Source: .Other [facility] [place]  Final Report (2022 16:42):    Culture yields growth of greater than 3 colony types of    Call client services within 7 days if further workup is clinically    indicated.      CAPILLARY BLOOD GLUCOSE      POCT Blood Glucose.: 126 mg/dL (2022 07:16)  POCT Blood Glucose.: 124 mg/dL (2022 01:14)  POCT Blood Glucose.: 135 mg/dL (2022 16:48)        RADIOLOGY & ADDITIONAL TESTS:  Results Reviewed:   Imaging Personally Reviewed:  Electrocardiogram Personally Reviewed:      82yo Female with hx of HTN, transferred from [facility] after s/p mechanical fall, with ongoing sx of feeling unwell x 1 month, admitted to the NEURO sx service with corpus callosal lesion, now s/p craniotomy for biopsy 22, postoperative course complicated with hemorrhage in surgical bed/biopsy tract. Also noted on ct/ MRI AP showed mild MS LADs, and left hepatic mass. MRI abd showed left hepatic lobe atrophy and mild intrahepatic dilatation involving segments 2 and 3. Suspicion for stricturing mass at the junction of the medial and lateral left lobes. Pt s/p EGD/EUS guided liver biopsy showing Lesion of liver, Candidiasis of the esophagus, Gastritis, started on Diflucan. Pt was found to have abdominal distension subsequently on exam yesterday with xray showing distension of the stomach and NGT was placed for decompression. CT a/p was ordered which came back this evening pertinent for new moderate to large ascites which appears simple,  large pneumoperitoneum without evident source and ascending colitis. Surgical consult was placed by primary neurosurgical team for further management.     GOAL OF CARE   I saw patient with family at bedside this AM , Initially they wanted DNR/DNI with medical management with continuation of NGT to suction, antibiotics, PPI, NPO status . However after meeting with palliative care , Daughter ( HCP [employer] ) wishes to pursue an entirely comfort care NO IVF , NO ANTIBIOTICS , NO STEROIDS                                                        [facility] Division of Hospital Medicine    Chief Complaint:      SUBJECTIVE / OVERNIGHT EVENTS:    Patient denies chest pain, SOB, abd pain, N/V, fever, chills, dysuria or any other complaints. All remainder ROS negative.     MEDICATIONS  (STANDING):  dexAMETHasone  Injectable   IV Push   dexAMETHasone  Injectable 2 milliGRAM(s) IV Push every 6 hours  dextrose 40% Gel 15 Gram(s) Oral once  dextrose 5%. 1000 milliLiter(s) (50 mL/Hr) IV Continuous <Continuous>  dextrose 5%. 1000 milliLiter(s) (100 mL/Hr) IV Continuous <Continuous>  dextrose 50% Injectable 25 Gram(s) IV Push once  dextrose 50% Injectable 12.5 Gram(s) IV Push once  dextrose 50% Injectable 25 Gram(s) IV Push once  doxazosin 4 milliGRAM(s) Oral at bedtime  fluconAZOLE IVPB 100 milliGRAM(s) IV Intermittent every 24 hours  glucagon  Injectable 1 milliGRAM(s) IntraMuscular once  insulin lispro (ADMELOG) corrective regimen sliding scale   SubCutaneous Before meals and at bedtime  melatonin 5 milliGRAM(s) Oral at bedtime  metoprolol tartrate 25 milliGRAM(s) Oral two times a day  pantoprazole  Injectable 40 milliGRAM(s) IV Push every 12 hours  piperacillin/tazobactam IVPB.. 3.375 Gram(s) IV Intermittent every 8 hours  polyethylene glycol 3350 17 Gram(s) Oral two times a day  senna 2 Tablet(s) Oral at bedtime  sodium chloride 1 Gram(s) Oral three times a day  sodium chloride 0.9%. 1000 milliLiter(s) (100 mL/Hr) IV Continuous <Continuous>  valproic acid 250 milliGRAM(s) Oral two times a day    MEDICATIONS  (PRN):  acetaminophen     Tablet .. 650 milliGRAM(s) Oral every 6 hours PRN Mild Pain (1 - 3)  acetaminophen   IVPB .. 1000 milliGRAM(s) IV Intermittent once PRN Temp greater or equal to 38C (100.4F)  hydrALAZINE Injectable 10 milliGRAM(s) IV Push every 2 hours PRN SBP > 140  labetalol Injectable 10 milliGRAM(s) IV Push every 2 hours PRN SBP > 140  morphine  - Injectable 2 milliGRAM(s) IV Push every 6 hours PRN Moderate Pain (4 - 6)  OLANZapine 2.5 milliGRAM(s) Oral every 12 hours PRN agitation  ondansetron Injectable 4 milliGRAM(s) IV Push every 6 hours PRN Nausea and/or Vomiting        I&O's Summary    2022 07:01  -  2022 07:00  --------------------------------------------------------  IN: 1750 mL / OUT: 125 mL / NET: 1625 mL        PHYSICAL EXAM:  Vital Signs Last 24 Hrs  T(C): 37.9 (2022 11:16), Max: 37.9 (2022 11:16)  T(F): 100.3 (2022 11:16), Max: 100.3 (2022 11:16)  HR: 120 (2022 11:16) (105 - 120)  BP: 112/74 (2022 10:58) (102/81 - 126/75)  BP(mean): --  RR: 18 (2022 10:58) (18 - 19)  SpO2: 93% (2022 10:58) (92% - 94%)        CONSTITUTIONAL: NAD, well-developed, well-groomed  ENMT: Moist oral mucosa, no pharyngeal injection or exudates; normal dentition  RESPIRATORY: Normal respiratory effort; lungs are clear to auscultation bilaterally  CARDIOVASCULAR: Regular rate and rhythm, normal S1 and S2, no murmur/rub/gallop; No lower extremity edema; Peripheral pulses are 2+ bilaterally  ABDOMEN: Nontender to palpation, normoactive bowel sounds, no rebound/guarding; No hepatosplenomegaly  MUSCLOSKELETAL:  Normal gait; no clubbing or cyanosis of digits; no joint swelling or tenderness to palpation  PSYCH: A+O to person, place, and time; affect appropriate  NEUROLOGY: CN 2-12 are intact and symmetric; no gross sensory deficits;   SKIN: No rashes; no palpable lesions    LABS:                        14.2   30.54 )-----------( 154      ( 2022 04:18 )             42.1     02-03    139  |  104  |  42.4<H>  ----------------------------<  136<H>  4.9   |  22.0  |  0.71    Ca    9.2      2022 04:18  Phos  4.0     02  Mg     2.1         TPro  5.0<L>  /  Alb  2.1<L>  /  TBili  0.5  /  DBili  x   /  AST  22  /  ALT  27  /  AlkPhos  94      PT/INR - ( 2022 22:11 )   PT: 14.7 sec;   INR: 1.28 ratio               Urinalysis Basic - ( 2022 22:03 )    Color: Yellow / Appearance: Clear / S.015 / pH: x  Gluc: x / Ketone: Small  / Bili: Negative / Urobili: 4 mg/dL   Blood: x / Protein: 15 / Nitrite: Negative   Leuk Esterase: Negative / RBC: 11-25 /HPF / WBC 3-5 /HPF   Sq Epi: x / Non Sq Epi: Occasional / Bacteria: Occasional        Culture - Other (collected 2022 14:52)  Source: .Other [facility] [place]  Final Report (2022 16:42):    Culture yields growth of greater than 3 colony types of    Call client services within 7 days if further workup is clinically    indicated.      CAPILLARY BLOOD GLUCOSE      POCT Blood Glucose.: 126 mg/dL (2022 07:16)  POCT Blood Glucose.: 124 mg/dL (2022 01:14)  POCT Blood Glucose.: 135 mg/dL (2022 16:48)        RADIOLOGY & ADDITIONAL TESTS:  Results Reviewed:   Imaging Personally Reviewed:  Electrocardiogram Personally Reviewed:      80yo Female with hx of HTN, transferred from [facility] after s/p mechanical fall, with ongoing sx of feeling unwell x 1 month, admitted to the NEURO sx service with corpus callosal lesion, now s/p craniotomy for biopsy 22, postoperative course complicated with hemorrhage in surgical bed/biopsy tract. Also noted on ct/ MRI AP showed mild MS LADs, and left hepatic mass. MRI abd showed left hepatic lobe atrophy and mild intrahepatic dilatation involving segments 2 and 3. Suspicion for stricturing mass at the junction of the medial and lateral left lobes. Pt s/p EGD/EUS guided liver biopsy showing Lesion of liver, Candidiasis of the esophagus, Gastritis, started on Diflucan. Pt was found to have abdominal distension subsequently on exam yesterday with xray showing distension of the stomach and NGT was placed for decompression. CT a/p was ordered which came back this evening pertinent for new moderate to large ascites which appears simple,  large pneumoperitoneum without evident source and ascending colitis. Surgical consult was placed by primary neurosurgical team for further management.     GOAL OF CARE   I saw patient with family at bedside this AM , Initially they wanted DNR/DNI with medical management with continuation of NGT to suction, antibiotics, PPI, NPO status . However after meeting with palliative care , Daughter ( HCP [employer] ) said she " changed her mind after discussion with siblings . Family  wishes to pursue entirely comfort care for now (  NO IVF , NO ANTIBIOTICS , NO STEROIDS , NO BLOOD DRAW )  Family understand patient will likely  if all meds are taken off and  they agree with the plan . They would also like to pursue hospice for now   Discussed with palliative care  NP Danette as well as RN and Nurse wil [place] at bedside .   Will stop ALL MEDS excepts comforts meds like morphine , lorazepam and laxatives   HCP Gm agree with the plan   PT IS DNR/DNR COMFORT CARE ONLY

## 2022-02-03 NOTE — PROGRESS NOTE ADULT - PROBLEM SELECTOR PLAN 1
MRI abdomen reported as left hepatic lobe atrophy and mild intrahepatic dilatation, suspicion for stricturing mass at the junction of the medial and lateral left lobes.  Labs reviewed. S/p craniotomy for biopsy (01/19/22).  - Brain biopsy results + for B-cell lymphoma. S/p EGD/EUS guided liver biopsy showing Lesion of liver, Candidiasis of the esophagus, Gastritis, No Bleeding.  Pt was found to have abdominal distention, tenderness with x-ray showing distension of the stomach then NGT was placed for decompression. CT a/p revealed new moderate to large ascites which appears simple, large pneumoperitoneum without evident source and ascending colitis. Surgical team consulted and discussed with family whom at this time is opting out of any surgical intervention. This morning, Abdomen distention and tenderness improved. Patient remains confused and slightly lethargic. NPO with NGT to lws. Leukocytosis with WBC 30.5, currently on Zosyn. LFTs normal.    - Continue with NGT to LWS   - Continue to trend CBC, CMP.    - Will order x-ray KUB tomorrow morning to assess abdominal distention.    - Continue Zosyn & IVF.   - Diflucan changed to IVPB 100mg for the next 13 days for treatment of candidiasis of esophagus.    - IV PPI BID for mucosal cytoprotection.

## 2022-02-03 NOTE — PROGRESS NOTE ADULT - PROBLEM SELECTOR PROBLEM 1
R/O Liver mass

## 2022-02-03 NOTE — CONSULT NOTE ADULT - SUBJECTIVE AND OBJECTIVE BOX
HPI:  Patient is a 81y old  Female who presents with a chief complaint of brain mass.    HPI: Patient is a 80 y/o F PMH HTN transferred from [facility] [place] s/p trip & fall. Patient states she came home from work, didn't feel well and then fell and hit her head into her bathtub. Unsure if she syncopized. Denies LOC. Patient called EMS herself. Patient states she's been feeling "off" for 2 weeks but can't describe what was wrong with her. She noticed today that she felt unsteady on her feet. Currently denies headache, use of blood thinners, cancer history, visual disturbance, nausea/vomiting, fever/chills, weakness, numbness/tingling. CTH at [facility] [place] reveals Butterfly type hyperattenuating lesion involving the corpus callosum concerning for malignancy with glioblastoma and primary CNS lymphoma.     (2022 20:29)      HPI:  80 yo female with a history of HTN, presents to [facility] as a transfer from [facility] [place], following a trip and fall.  Per admission notes, patient felt unwell, then fell and hit her head in the bathroom, uncertain if she syncopized, denies LOC.  Patient has been feeling "off" for two weeks, but cannot describe what was wrong with her.  CT of the head at [facility] [place] revealed a butterfly type hyperattenuating lesion involving the corpus callosum concerning for malignancy with glioblastoma and primary CNS lymphoma.  Patient was transferred here for further workup.    MRI of the brain revealed:  corpus callosal infiltrative lesion, there are approximately 5 other lesions with associated edema and enhancement. Vasogenic edema is most extensive in the left parietal region. Differential considerations, therefore, include the possibility of metastatic disease versus lymphoma rather than glioblastoma.     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  Postoperatively, patient with stroke-like seizures, CT head showed acute hemorrhage along the biopsy tract in L parietal lobe extending to the mass within the posterior corpus callosum.     Additionally, imaging on admission in the setting of the brain mass revealed mildly enlarged mediastinal and bilateral hilar lymph nodes, and a region of low attenuation in the central aspect of the left hepatic lobe measuring approximately 2.8 cm with mild biliary ductal dilatation in the left hepatic lobe and left hepatic lobe atrophy.  An MRI of the abdomen showed stricturing mass at the junction of the medial and lateral left lobes. 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  S/p Liver Biopsy on 22, path pending.    Abdominal distension noted, NGT tube placed for decompression.  A CT a/p was ordered, showing new moderate to large ascites which appears simple,  large pneumoperitoneum without evident source and ascending colitis.  Surgical consultation declined by family; DNR/I orders placed by family.  Family is leaning towards avoiding comfort focused care.  Palliative care consulted to further address goals of care, care plan.      PERTINENT PMH REVIEWED: Yes No    PAST MEDICAL & SURGICAL HISTORY:  HTN (hypertension)    No significant past surgical history        SOCIAL HISTORY:                                     Admitted from:  home  SNF  [facility]     Surrogate/HCP/Guardian: Phone#:    FAMILY HISTORY:  No pertinent family history in first degree relatives        Baseline ADLs (prior to admission):  Independent/ Dependent      Allergies    Bananas (Angioedema; Anaphylaxis; Short breath)  Kiwi (Angioedema; Anaphylaxis; Short breath)  latex (Unknown)  [place] (Angioedema; Anaphylaxis; Short breath)  No Known Drug Allergies  strawberry (Angioedema; Anaphylaxis; Short breath)    Intolerances        Present Symptoms:     Dyspnea: 0 1 2 3   Nausea/Vomiting: Yes No  Anxiety:  Yes No  Depression: Yes No  Fatigue: Yes No  Loss of appetite: Yes No    Pain:             Character-            Duration-            Effect-            Factors-            Frequency-            Location-            Severity-    Review of Systems: Reviewed                     Negative:                     Positive:  Unable to obtain due to poor mentation   All others negative    MEDICATIONS  (STANDING):  dexAMETHasone     Tablet   Oral   dexAMETHasone     Tablet 2 milliGRAM(s) Oral every 6 hours  dextrose 40% Gel 15 Gram(s) Oral once  dextrose 5%. 1000 milliLiter(s) (50 mL/Hr) IV Continuous <Continuous>  dextrose 5%. 1000 milliLiter(s) (100 mL/Hr) IV Continuous <Continuous>  dextrose 50% Injectable 25 Gram(s) IV Push once  dextrose 50% Injectable 12.5 Gram(s) IV Push once  dextrose 50% Injectable 25 Gram(s) IV Push once  doxazosin 4 milliGRAM(s) Oral at bedtime  fluconAZOLE IVPB 100 milliGRAM(s) IV Intermittent every 24 hours  glucagon  Injectable 1 milliGRAM(s) IntraMuscular once  insulin lispro (ADMELOG) corrective regimen sliding scale   SubCutaneous Before meals and at bedtime  melatonin 5 milliGRAM(s) Oral at bedtime  metoprolol tartrate 25 milliGRAM(s) Oral two times a day  pantoprazole  Injectable 40 milliGRAM(s) IV Push every 12 hours  piperacillin/tazobactam IVPB.. 3.375 Gram(s) IV Intermittent every 8 hours  polyethylene glycol 3350 17 Gram(s) Oral two times a day  senna 2 Tablet(s) Oral at bedtime  sodium chloride 1 Gram(s) Oral three times a day  sodium chloride 0.9%. 1000 milliLiter(s) (100 mL/Hr) IV Continuous <Continuous>  valproic acid 250 milliGRAM(s) Oral two times a day    MEDICATIONS  (PRN):  acetaminophen     Tablet .. 650 milliGRAM(s) Oral every 6 hours PRN Mild Pain (1 - 3)  hydrALAZINE Injectable 10 milliGRAM(s) IV Push every 2 hours PRN SBP > 140  labetalol Injectable 10 milliGRAM(s) IV Push every 2 hours PRN SBP > 140  morphine  - Injectable 2 milliGRAM(s) IV Push every 6 hours PRN Moderate Pain (4 - 6)  OLANZapine 2.5 milliGRAM(s) Oral every 12 hours PRN agitation  ondansetron Injectable 4 milliGRAM(s) IV Push every 6 hours PRN Nausea and/or Vomiting      PHYSICAL EXAM:    Vital Signs Last 24 Hrs  T(C): 36.8 (2022 04:55), Max: 36.8 (2022 04:55)  T(F): 98.3 (2022 04:55), Max: 98.3 (2022 04:55)  HR: 112 (2022 09:02) (105 - 118)  BP: 126/75 (2022 04:55) (100/59 - 126/75)  BP(mean): --  RR: 19 (2022 04:55) (18 - 19)  SpO2: 92% (2022 04:55) (92% - 97%)    General: alert  oriented x ____ lethargic agitated                  cachexia  nonverbal  coma    Karnofsky:  %    HEENT: normal  dry mouth  ET tube/trach    Lungs: comfortable tachypnea/labored breathing  excessive secretions    CV: normal  tachycardia    GI: normal  distended  tender  no BS               PEG/NG/OG tube  constipation  last BM:     : normal  incontinent  oliguria/anuria  ortega    MSK: normal  weakness  edema             ambulatory  bedbound/wheelchair bound    Skin: normal  pressure ulcers- Stage_____  no rash    LABS:                        14.2   30.54 )-----------( 154      ( 2022 04:18 )             42.1     02-03    139  |  104  |  42.4<H>  ----------------------------<  136<H>  4.9   |  22.0  |  0.71    Ca    9.2      2022 04:18  Phos  4.0     02-  Mg     2.1     02-    TPro  5.0<L>  /  Alb  2.1<L>  /  TBili  0.5  /  DBili  x   /  AST  22  /  ALT  27  /  AlkPhos  94  02-03    PT/INR - ( 2022 22:11 )   PT: 14.7 sec;   INR: 1.28 ratio           Urinalysis Basic - ( 2022 22:03 )    Color: Yellow / Appearance: Clear / S.015 / pH: x  Gluc: x / Ketone: Small  / Bili: Negative / Urobili: 4 mg/dL   Blood: x / Protein: 15 / Nitrite: Negative   Leuk Esterase: Negative / RBC: 11-25 /HPF / WBC 3-5 /HPF   Sq Epi: x / Non Sq Epi: Occasional / Bacteria: Occasional      I&O's Summary    2022 07:01  -  2022 07:00  --------------------------------------------------------  IN: 1750 mL / OUT: 125 mL / NET: 1625 mL        RADIOLOGY & ADDITIONAL STUDIES:    ADVANCE DIRECTIVES:   DNR YES NO  Completed on:                     MOLST  YES NO   Completed on:  Living Will  YES NO   Completed on:     HPI:  Patient is a 81y old  Female who presents with a chief complaint of brain mass.    HPI: Patient is a 82 y/o F PMH HTN transferred from [facility] [place] s/p trip & fall. Patient states she came home from work, didn't feel well and then fell and hit her head into her bathtub. Unsure if she syncopized. Denies LOC. Patient called EMS herself. Patient states she's been feeling "off" for 2 weeks but can't describe what was wrong with her. She noticed today that she felt unsteady on her feet. Currently denies headache, use of blood thinners, cancer history, visual disturbance, nausea/vomiting, fever/chills, weakness, numbness/tingling. CTH at [facility] [place] reveals Butterfly type hyperattenuating lesion involving the corpus callosum concerning for malignancy with glioblastoma and primary CNS lymphoma.     (2022 20:29)      HPI:  82 yo female with a history of HTN, presents to [facility] as a transfer from [facility] [place], following a [place] and fall.  Per admission notes, patient felt unwell, then fell and hit her head in the bathroom, uncertain if she syncopized, denies LOC.  Patient has been feeling "off" for two weeks, but cannot describe what was wrong with her.  CT of the head at [facility] [place] revealed a butterfly type hyperattenuating lesion involving the corpus callosum concerning for malignancy with glioblastoma and primary CNS lymphoma.  Patient was transferred here for further workup.    MRI of the brain revealed:  corpus callosal infiltrative lesion, there are approximately 5 other lesions with associated edema and enhancement. Vasogenic edema is most extensive in the left parietal region. Differential considerations, therefore, include the possibility of metastatic disease versus lymphoma rather than glioblastoma.     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  Postoperatively, patient with stroke-like seizures, CT head showed acute hemorrhage along the biopsy tract in L parietal lobe extending to the mass within the posterior corpus callosum.     Additionally, imaging on admission in the setting of the brain mass revealed mildly enlarged mediastinal and bilateral hilar lymph nodes, and a region of low attenuation in the central aspect of the left hepatic lobe measuring approximately 2.8 cm with mild biliary ductal dilatation in the left hepatic lobe and left hepatic lobe atrophy.  An MRI of the abdomen showed stricturing mass at the junction of the medial and lateral left lobes. 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  S/p Liver Biopsy on 22, path pending.    Abdominal distension noted, NGT tube placed for decompression.  A CT a/p was ordered, showing new moderate to large ascites which appears simple,  large pneumoperitoneum without evident source and ascending colitis.  Surgical consultation declined by family; DNR/I orders placed by family.  Family is leaning towards a comfort focused care.  Palliative care consulted to further address goals of care, care plan.      PERTINENT PMH REVIEWED: Yes No    PAST MEDICAL & SURGICAL HISTORY:  HTN (hypertension)    No significant past surgical history        SOCIAL HISTORY:                                     Admitted from:  home  SNF  [facility]     Surrogate/HCP/Guardian: Phone#:    FAMILY HISTORY:  No pertinent family history in first degree relatives        Baseline ADLs (prior to admission):  Independent/ Dependent      Allergies    Bananas (Angioedema; Anaphylaxis; Short breath)  Kiwi (Angioedema; Anaphylaxis; Short breath)  latex (Unknown)  Boissev[place] (Angioedema; Anaphylaxis; Short breath)  No Known Drug Allergies  strawberry (Angioedema; Anaphylaxis; Short breath)    Intolerances        Present Symptoms:     Dyspnea: 0 1 2 3   Nausea/Vomiting: Yes No  Anxiety:  Yes No  Depression: Yes No  Fatigue: Yes No  Loss of appetite: Yes No    Pain:             Character-            Duration-            Effect-            Factors-            Frequency-            Location-            Severity-    Review of Systems: Reviewed                     Negative:                     Positive:  Unable to obtain due to poor mentation   All others negative    MEDICATIONS  (STANDING):  dexAMETHasone     Tablet   Oral   dexAMETHasone     Tablet 2 milliGRAM(s) Oral every 6 hours  dextrose 40% Gel 15 Gram(s) Oral once  dextrose 5%. 1000 milliLiter(s) (50 mL/Hr) IV Continuous <Continuous>  dextrose 5%. 1000 milliLiter(s) (100 mL/Hr) IV Continuous <Continuous>  dextrose 50% Injectable 25 Gram(s) IV Push once  dextrose 50% Injectable 12.5 Gram(s) IV Push once  dextrose 50% Injectable 25 Gram(s) IV Push once  doxazosin 4 milliGRAM(s) Oral at bedtime  fluconAZOLE IVPB 100 milliGRAM(s) IV Intermittent every 24 hours  glucagon  Injectable 1 milliGRAM(s) IntraMuscular once  insulin lispro (ADMELOG) corrective regimen sliding scale   SubCutaneous Before meals and at bedtime  melatonin 5 milliGRAM(s) Oral at bedtime  metoprolol tartrate 25 milliGRAM(s) Oral two times a day  pantoprazole  Injectable 40 milliGRAM(s) IV Push every 12 hours  piperacillin/tazobactam IVPB.. 3.375 Gram(s) IV Intermittent every 8 hours  polyethylene glycol 3350 17 Gram(s) Oral two times a day  senna 2 Tablet(s) Oral at bedtime  sodium chloride 1 Gram(s) Oral three times a day  sodium chloride 0.9%. 1000 milliLiter(s) (100 mL/Hr) IV Continuous <Continuous>  valproic acid 250 milliGRAM(s) Oral two times a day    MEDICATIONS  (PRN):  acetaminophen     Tablet .. 650 milliGRAM(s) Oral every 6 hours PRN Mild Pain (1 - 3)  hydrALAZINE Injectable 10 milliGRAM(s) IV Push every 2 hours PRN SBP > 140  labetalol Injectable 10 milliGRAM(s) IV Push every 2 hours PRN SBP > 140  morphine  - Injectable 2 milliGRAM(s) IV Push every 6 hours PRN Moderate Pain (4 - 6)  OLANZapine 2.5 milliGRAM(s) Oral every 12 hours PRN agitation  ondansetron Injectable 4 milliGRAM(s) IV Push every 6 hours PRN Nausea and/or Vomiting      PHYSICAL EXAM:    Vital Signs Last 24 Hrs  T(C): 36.8 (2022 04:55), Max: 36.8 (2022 04:55)  T(F): 98.3 (2022 04:55), Max: 98.3 (2022 04:55)  HR: 112 (2022 09:02) (105 - 118)  BP: 126/75 (2022 04:55) (100/59 - 126/75)  BP(mean): --  RR: 19 (2022 04:55) (18 - 19)  SpO2: 92% (2022 04:55) (92% - 97%)    General: alert  oriented x ____ lethargic agitated                  cachexia  nonverbal  coma    Karnofsky:  %    HEENT: normal  dry mouth  ET tube/trach    Lungs: comfortable tachypnea/labored breathing  excessive secretions    CV: normal  tachycardia    GI: normal  distended  tender  no BS               PEG/NG/OG tube  constipation  last BM:     : normal  incontinent  oliguria/anuria  ortega    MSK: normal  weakness  edema             ambulatory  bedbound/wheelchair bound    Skin: normal  pressure ulcers- Stage_____  no rash    LABS:                        14.2   30.54 )-----------( 154      ( 2022 04:18 )             42.1     -03    139  |  104  |  42.4<H>  ----------------------------<  136<H>  4.9   |  22.0  |  0.71    Ca    9.2      2022 04:18  Phos  4.0     02-  Mg     2.1     -    TPro  5.0<L>  /  Alb  2.1<L>  /  TBili  0.5  /  DBili  x   /  AST  22  /  ALT  27  /  AlkPhos  94  -    PT/INR - ( 2022 22:11 )   PT: 14.7 sec;   INR: 1.28 ratio           Urinalysis Basic - ( 2022 22:03 )    Color: Yellow / Appearance: Clear / S.015 / pH: x  Gluc: x / Ketone: Small  / Bili: Negative / Urobili: 4 mg/dL   Blood: x / Protein: 15 / Nitrite: Negative   Leuk Esterase: Negative / RBC: 11-25 /HPF / WBC 3-5 /HPF   Sq Epi: x / Non Sq Epi: Occasional / Bacteria: Occasional      I&O's Summary    2022 07:01  -  2022 07:00  --------------------------------------------------------  IN: 1750 mL / OUT: 125 mL / NET: 1625 mL        RADIOLOGY & ADDITIONAL STUDIES:    ADVANCE DIRECTIVES:   DNR YES NO  Completed on:                     MOLST  YES NO   Completed on:  Living Will  YES NO   Completed on:    ISTOP Report:  The Drug Utilization Report below displays all of the controlled substance prescriptions, if any, that your patient has filled in the last twelve months. The information displayed on this report is compiled from pharmacy submissions to the Department, and accurately reflects the information as submitted by the pharmacies.    This report was requested by: Danette Wong | Reference #: [id number]    There are no results for the search terms that you entered.           HPI:  Patient is a 81y old  Female who presents with a chief complaint of brain mass.    HPI: Patient is a 80 y/o F PMH HTN transferred from [facility] [place] s/p trip & fall. Patient states she came home from work, didn't feel well and then fell and hit her head into her bathtub. Unsure if she syncopized. Denies LOC. Patient called EMS herself. Patient states she's been feeling "off" for 2 weeks but can't describe what was wrong with her. She noticed today that she felt unsteady on her feet. Currently denies headache, use of blood thinners, cancer history, visual disturbance, nausea/vomiting, fever/chills, weakness, numbness/tingling. CTH at [facility] [place] reveals Butterfly type hyperattenuating lesion involving the corpus callosum concerning for malignancy with glioblastoma and primary CNS lymphoma.     (2022 20:29)      HPI:  80 yo female with a history of HTN, presents [place] [facility] [place] [facility] [place].  Per admission notes, patient felt unwell, then fell and hit her head in the bathroom, uncertain if she syncopized, denies LOC.  Patient has been feeling "off" for two weeks, but cannot describe what was wrong with her.  CT of the head at [facility] [place] revealed a butterfly type hyperattenuating lesion involving the corpus callosum concerning for malignancy with glioblastoma and primary CNS lymphoma.  Patient was transferred here for further workup.    MRI of the brain revealed:  corpus callosal infiltrative lesion, there are approximately 5 other lesions with associated edema and enhancement. Vasogenic edema is most extensive in the left parietal region. Differential considerations, therefore, include the possibility of metastatic disease versus lymphoma rather than glioblastoma.     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  Postoperatively, patient with stroke-like symptoms, and CT head showed acute hemorrhage along the biopsy tract in L parietal lobe extending to the mass within the posterior corpus callosum.     Additionally, imaging on admission in the setting of the brain mass revealed mildly enlarged mediastinal and bilateral hilar lymph nodes, and a region of low attenuation in the central aspect of the left hepatic lobe measuring approximately 2.8 cm with mild biliary ductal dilatation in the left hepatic lobe and left hepatic lobe atrophy.  An MRI of the abdomen showed stricturing mass at the junction of the medial and lateral left lobes. 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  S/p Liver Biopsy on 22, path pending.    Abdominal distension noted, NGT tube placed for decompression.  A CT a/p was ordered, showing new moderate to large ascites which appears simple,large pneumoperitoneum without evident source and ascending colitis.  Surgical intervention declined by family; DNR/I orders placed by family.  Family is leaning towards a comfort focused care.  Palliative care consulted to further address goals of care, care plan.    Unable to obtain HPI from patient.  When patient asked what brought her here, patient expresses uncertainty.  States, "I am dying," and expresses love for family at bedside.  Patient's sister and daughter, Gm Cleaning, at bedside offer that in hindsight, they note some subtle changes in the patient, including some repetition at [place], and seeming a bit "off," two weeks prior to presentation.      PERTINENT PMH REVIEWED: Yes    PAST MEDICAL & SURGICAL HISTORY:  HTN (hypertension)    No significant past surgical history    SOCIAL HISTORY:  from home, .  4 children (one )                                       Surrogate/HCP/Guardian: Gm Cleaning  (daughter) Phone#: [phone]    FAMILY HISTORY:  No pertinent family history in first degree relatives      Baseline ADLs (prior to admission):  Independent with ADLs, IADLs.    Allergies  Bananas (Angioedema; Anaphylaxis; Short breath)  Kiwi (Angioedema; Anaphylaxis; Short breath)  latex (Unknown)  [place] (Angioedema; Anaphylaxis; Short breath)  No Known Drug Allergies  strawberry (Angioedema; Anaphylaxis; Short breath)    Intolerances    Present Symptoms: limited ROS, patient intermittently tangential    Dyspnea: 0  Nausea/Vomiting: No  Anxiety:  does not respond   Depression: does not respond  Fatigue: does not respond  Loss of appetite: does not respond    Pain: denies    Review of Systems: Reviewed.  Specific responses noted above.  When asked if there is anything troubling her, patient says no.                     Negative:                     Positive:  Unable to obtain due to poor mentation   All others negative    MEDICATIONS  (STANDING):  dexAMETHasone     Tablet   Oral   dexAMETHasone     Tablet 2 milliGRAM(s) Oral every 6 hours  dextrose 40% Gel 15 Gram(s) Oral once  dextrose 5%. 1000 milliLiter(s) (50 mL/Hr) IV Continuous <Continuous>  dextrose 5%. 1000 milliLiter(s) (100 mL/Hr) IV Continuous <Continuous>  dextrose 50% Injectable 25 Gram(s) IV Push once  dextrose 50% Injectable 12.5 Gram(s) IV Push once  dextrose 50% Injectable 25 Gram(s) IV Push once  doxazosin 4 milliGRAM(s) Oral at bedtime  fluconAZOLE IVPB 100 milliGRAM(s) IV Intermittent every 24 hours  glucagon  Injectable 1 milliGRAM(s) IntraMuscular once  insulin lispro (ADMELOG) corrective regimen sliding scale   SubCutaneous Before meals and at bedtime  melatonin 5 milliGRAM(s) Oral at bedtime  metoprolol tartrate 25 milliGRAM(s) Oral two times a day  pantoprazole  Injectable 40 milliGRAM(s) IV Push every 12 hours  piperacillin/tazobactam IVPB.. 3.375 Gram(s) IV Intermittent every 8 hours  polyethylene glycol 3350 17 Gram(s) Oral two times a day  senna 2 Tablet(s) Oral at bedtime  sodium chloride 1 Gram(s) Oral three times a day  sodium chloride 0.9%. 1000 milliLiter(s) (100 mL/Hr) IV Continuous <Continuous>  valproic acid 250 milliGRAM(s) Oral two times a day    MEDICATIONS  (PRN):  acetaminophen     Tablet .. 650 milliGRAM(s) Oral every 6 hours PRN Mild Pain (1 - 3)  hydrALAZINE Injectable 10 milliGRAM(s) IV Push every 2 hours PRN SBP > 140  labetalol Injectable 10 milliGRAM(s) IV Push every 2 hours PRN SBP > 140  morphine  - Injectable 2 milliGRAM(s) IV Push every 6 hours PRN Moderate Pain (4 - 6)  OLANZapine 2.5 milliGRAM(s) Oral every 12 hours PRN agitation  ondansetron Injectable 4 milliGRAM(s) IV Push every 6 hours PRN Nausea and/or Vomiting      PHYSICAL EXAM:    Vital Signs Last 24 Hrs  T(C): 36.8 (2022 04:55), Max: 36.8 (2022 04:55)  T(F): 98.3 (2022 04:55), Max: 98.3 (2022 04:55)  HR: 112 (2022 09:02) (105 - 118)  BP: 126/75 (2022 04:55) (100/59 - 126/75)  BP(mean): --  RR: 19 (2022 04:55) (18 - 19)  SpO2: 92% (2022 04:55) (92% - 97%)    Karnofsky: 20%  Gen: In NAD.  No pain behaviors.  Mild tachypnea noted: RR: 22-24  Neuro: awake, intermittently responds to questions.  Tangential at times  Head: NC/AT  Eyes: sclerae non-icteric  ENT: NGT  Resp: unlabored  CV: RRR  Abd: softly distended  : ortega bag without gross hematuria  Peripheral Vasc: no pedal edema  Int: warm and dry  Psych: no psychomotor agitation      LABS:                        14.2   30.54 )-----------( 154      ( 2022 04:18 )             42.1     02-03    139  |  104  |  42.4<H>  ----------------------------<  136<H>  4.9   |  22.0  |  0.71    Ca    9.2      2022 04:18  Phos  4.0     02-02  Mg     2.1     02-02    TPro  5.0<L>  /  Alb  2.1<L>  /  TBili  0.5  /  DBili  x   /  AST  22  /  ALT  27  /  AlkPhos  94  02-03    PT/INR - ( 2022 22:11 )   PT: 14.7 sec;   INR: 1.28 ratio           Urinalysis Basic - ( 2022 22:03 )    Color: Yellow / Appearance: Clear / S.015 / pH: x  Gluc: x / Ketone: Small  / Bili: Negative / Urobili: 4 mg/dL   Blood: x / Protein: 15 / Nitrite: Negative   Leuk Esterase: Negative / RBC: 11-25 /HPF / WBC 3-5 /HPF   Sq Epi: x / Non Sq Epi: Occasional / Bacteria: Occasional      I&O's Summary    2022 07:01  -  2022 07:00  --------------------------------------------------------  IN: 1750 mL / OUT: 125 mL / NET: 1625 mL        RADIOLOGY & ADDITIONAL STUDIES: reviewed    Total time spent in advance care planin min    DNR/I orders  No documented HCP on file.  Gm Cleaning: spokesperson for children.  Patient is     ISTOP Report:  The Drug Utilization Report below displays all of the controlled substance prescriptions, if any, that your patient has filled in the last twelve months. The information displayed on this report is compiled from pharmacy submissions to the Department, and accurately reflects the information as submitted by the pharmacies.    This report was requested by: Danette Wong | Reference #: [id number]    There are no results for the search terms that you entered.

## 2022-02-03 NOTE — CONSULT NOTE ADULT - ASSESSMENT
80 yo female with a history of HTN, presents to [facility] as a transfer from [facility] [place] for work up of a brain mass that was found on imaging, following her presentation there for a trip and fall.  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  Postoperatively, patient with stroke-like seizures, CT head showed acute hemorrhage along the biopsy tract in L parietal lobe extending to the mass within the posterior corpus callosum.   An MRI of the abdomen showed stricturing mass at the junction of the medial and lateral left lobes. 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  S/p Liver Biopsy on 2/1/22, path pending.    Abdominal distension noted, NGT tube placed for decompression.  A CT a/p was ordered, showing new moderate to large ascites which appears simple,  large pneumoperitoneum without evident source and ascending colitis.  Surgical consultation declined by family; DNR/I orders placed by family.  Family is leaning towards a comfort focused care.  Palliative care consulted to further address goals of care, care plan.  Conversation, detailed below, reflects shift to a comfort measures only plan, based on the patient's wishes    #DLBCL:  -s/p brain bx.    -No plan for oncologic involvement.  Desire for comfort focused care.    #liver mass:  -s/p bx on 2/1.  Path pending.    -no plan for oncologic management, if bx demonstrates a malignancy.  Desire for comfort focused care    #dyspnea:  -ascites may contribute to work of breathing in the patient.  -would start dilaudid 0.3 mg IV q 4 hrs around the clock: RN to assess patient and administer dilaudid if RR  is > 24, if there is evidence of work of breathing or pain.  Patient may decline dilaudid.  -would start dilaudid 0.3 mg IV q 4 hrs PRN ( RR > 24, evidence of pain or work of breathing)    #encephalopathy:  -Likely multifactorial in the setting of recent surgery, malignancy leukocytosis  -Provide reassurance, re-orientation, day/night distinction.  Compartmentalize care, avoid interruptions of sleep.  -Would start lorazepam 0.5 mg IV q 4 hrs PRN (anxiety)    #debility  -supportive care    #palliative care encounter:  -Ent[place]     82 yo female with a history of HTN, presents to [facility] as a transfer from [facility] for work up of a brain mass that was found on imaging, following her presentation there for a trip and fall.  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  Postoperatively, patient with stroke-like seizures, CT head showed acute hemorrhage along the biopsy tract in L parietal lobe extending to the mass within the posterior corpus callosum.   An MRI of the abdomen showed stricturing mass at the junction of the medial and lateral left lobes. 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  S/p Liver Biopsy on 2/1/22, path pending.    Abdominal distension noted, NGT tube placed for decompression.  A CT a/p was ordered, showing new moderate to large ascites which appears simple,  large pneumoperitoneum without evident source and ascending colitis.  Surgical consultation declined by family; DNR/I orders placed by family.  Family is leaning towards a comfort focused care.  Palliative care consulted to further address goals of care, care plan.  Conversation, detailed below, reflects shift to a comfort measures only plan, based on the patient's wishes    #DLBCL:  -s/p brain bx.    -No plan for oncologic involvement.  Desire for comfort focused care.    #liver mass:  -s/p bx on 2/1.  Path pending.    -no plan for oncologic management, if bx demonstrates a malignancy.  Desire for comfort focused care    #dyspnea:  -ascites may contribute to work of breathing in the patient.  -would start dilaudid 0.3 mg IV q 4 hrs around the clock: RN to assess patient and administer dilaudid if RR  is > 24, if there is evidence of work of breathing or pain.  Patient may decline dilaudid.  -would start dilaudid 0.3 mg IV q 4 hrs PRN ( RR > 24, evidence of pain or work of breathing)    #encephalopathy:  -Likely multifactorial in the setting of recent surgery, malignancy leukocytosis  -Provide reassurance, re-orientation, day/night distinction.  Compartmentalize care, avoid interruptions of sleep.  -Would start lorazepam 0.5 mg IV q 4 hrs PRN (anxiety)    #debility  -supportive care    #palliative care encounter:  -Spoke with daughter, Gm Cleaning and patient's sister, today.  Introduced role of palliative care in symptom management, care planning, support, and transitions in care to those with serious illness.  Emotional support offered.  -     82 yo female with a history of HTN, [place] [facility] [place] [facility] brain mass that was found on imaging, following her presentation there for a trip and fall.  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  Postoperatively, patient with stroke-like seizures, CT head showed acute hemorrhage along the biopsy tract in L parietal lobe extending to the mass within the posterior corpus callosum.   An MRI of the abdomen showed stricturing mass at the junction of the medial and lateral left lobes. 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  S/p Liver Biopsy on 22, path pending.    Abdominal distension noted, NGT tube placed for decompression.  A CT a/p was ordered, showing new moderate to large ascites which appears simple,  large pneumoperitoneum without evident source and ascending colitis.  Surgical consultation declined by family; DNR/I orders placed by family.  Family is leaning towards a comfort focused care.  Palliative care consulted to further address goals of care, care plan.  Conversation, detailed below, reflects shift to a comfort measures only plan, based on the patient's wishes    #DLBCL:  -s/p brain bx.    -No plan for oncologic management.  Desire for comfort focused care.    #liver mass:  -s/p bx on .  Path pending.    -no plan for oncologic management, if bx demonstrates a malignancy.  Desire for comfort focused care.    #dyspnea:  -ascites may contribute to work of breathing in the patient.  -would start dilaudid 0.3 mg IV q 4 hrs around the clock: RN to assess patient and administer dilaudid if RR  is > 24, if there is evidence of work of breathing or pain.  Patient may decline dilaudid.  -would start dilaudid 0.3 mg IV q 4 hrs PRN ( RR > 24, evidence of pain or work of breathing)    #encephalopathy:  -Likely multifactorial in the setting of recent surgery, malignancy, leukocytosis  -Patient is at high risk for delirium.  Provide reassurance, re-orientation, day/night distinction.  Compartmentalize care, avoid interruptions of sleep.  -Would start lorazepam 0.5 mg IV q 4 hrs PRN (anxiety)    #debility  -supportive care    #palliative care encounter:  -Spoke with daughter, Gm Cleaning and patient's sister, at length at the bedside.  Introduced role of palliative care in symptom management, care planning, support, and transitions in care to those with serious illness.  Emotional support offered.  -Gm indicates that she is the health care proxy.  No documented proxy.  Patient is a , three living children (one ).  Spoke to patient's other children: Dakota: [phone]; [place]: [phone], who affirmed that medical decisions may be taken from Gm, and that she is in conversation with them on the patient's care.  -Clinical course reviewed.  Family shares that the patient had some subtle changes in her since [place], but that she called 911 herself, and was working prior to admission, independent with ADLs, IADLs.  They note that with each intervention, patient has declined.  They have declined.       82 yo female with a history of HTN, presents to [facility] as a transfer from [facility] [place] for work up of a brain mass that was found on imaging, following her presentation there for a trip and fall.  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  Postoperatively, patient with stroke-like symptoms, CT head showed acute hemorrhage along the biopsy tract in L parietal lobe extending to the mass within the posterior corpus callosum.   An MRI of the abdomen showed stricturing mass at the junction of the medial and lateral left lobes. 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  S/p Liver Biopsy on 22, path pending.    Abdominal distension noted, NGT tube placed for decompression.  A CT a/p was ordered, showing new moderate to large ascites which appears simple, large pneumoperitoneum without evident source and ascending colitis.  Surgical intervention declined by family; DNR/I orders placed by family.  Family is leaning towards a comfort focused care.  Palliative care consulted to further address goals of care, care plan.  Conversation, detailed below, reflects shift to a comfort measures only plan, based on the patient's wishes    #DLBCL:  -s/p brain bx.  On dexamethasone.  -No plan for oncologic management.  Desire for comfort focused care.    #liver mass:  -s/p bx on .  Path pending.    -no plan for oncologic management, if bx demonstrates a malignancy.  Desire for comfort focused care.    #dyspnea:  -ascites may contribute to work of breathing in the patient.  -would start dilaudid 0.3 mg IV q 4 hrs around the clock: RN to assess patient and administer dilaudid if RR  is > 24, if there is evidence of work of breathing or pain.  Patient may decline dilaudid.  -would start dilaudid 0.3 mg IV q 4 hrs PRN ( RR > 24, evidence of pain or work of breathing)    #leukocytosis  -colitis on CT.  -on antibx    #encephalopathy:  -Likely multifactorial in the setting of recent surgery, malignancy, leukocytosis  -Patient is at high risk for delirium.  Provide reassurance, re-orientation, day/night distinction.  Compartmentalize care, avoid interruptions of sleep.  -Would start lorazepam 0.5 mg IV q 4 hrs PRN (anxiety)  -on olanzapine prn, per primary team    #debility  -supportive care    #palliative care encounter:  -Spoke with daughter, Gm Cleaning and patient's sister, at length at the bedside.  Introduced role of palliative care in symptom management, care planning, support, and transitions in care to those with serious illness.  Emotional support offered.  -Gm indicates that she is the health care proxy.  No documented proxy.  Patient is a , three living children (one ).  Spoke to patient's other children: Dakota: [phone]; [place]: [phone], who affirmed that medical decisions may be taken from Gm, and that she is in conversation with them on the patient's care.  -Clinical course reviewed.  Family shares that the patient had some subtle changes in her since , but that she called 911 herself, and was working prior to admission, independent with ADLs, IADLs.  They note that with each intervention, patient has declined substantially.  -Family aware of the DLBCL dx and concern for malignancy in liver.  Reviewed that receipt/ongoing receipt tumor directed tx is predicated on functional status.  Patient's family notes that patient is bedbound at this point , and do not think that given all of the insults, she will be strong enough to receive it.  -Options for care reviewed.  -They believe, based on prior conversations held with the patient, that if patient could speak for herself, patient would want to focus on comfort.    -Patient herself reports that she is dying; family reflects that she has talked about the closing of the [place] of life.  -at this point, family wishes to pursue an entirely comfort focused plan of care: medications and interventions limited to those that keep her comfortable.  No further labs.  MOLST completed: DNR/I/comfort measures only  -Hospice discussed as a means to care for the pt at this point in time.  Family notes that they would like to pursue hospice (appears appropriate fo [facility] at this time), through [facility].  -Chaplaincy support sought through : DARIUS calling chaplains office.  -Discussed aforementioned with RN, DARIUS, attending, family.

## 2022-02-03 NOTE — PROGRESS NOTE ADULT - ASSESSMENT
82yo Female with hx of HTN, [place] [facility] [place], with ongoing sx of feeling unwell x 1 month, admitted to the NEURO sx service with corpus callosal lesion, now s/p craniotomy for biopsy 1/19/22, postoperative course complicated with hemorrhage in surgical bed/biopsy tract. Also noted on ct/ MRI AP showed mild MS LADs, and left hepatic mass. MRI abd showed left hepatic lobe atrophy and mild intrahepatic dilatation involving segments 2 and 3. Suspicion for stricturing mass at the junction of the medial and lateral left lobes. Pt s/p EGD/EUS guided liver biopsy showing Lesion of liver, Candidiasis of the esophagus, Gastritis, started on Diflucan. Pt was found to have abdominal distension subsequently on exam yesterday with xray showing distension of the stomach and NGT was placed for decompression. CT a/p was ordered which came back this evening pertinent for new moderate to large ascites which appears simple,  large pneumoperitoneum without evident source and ascending colitis. Surgical consult was placed by primary neurosurgical team for further management. As per note surgery team spoke with family extensively regarding surgical intervention and family decided to not move forward with any surgical interventions as they considering comfort/palliative measures. Medicine team called for transfer of service to medicine. multiple goal of care discussion noted in the chart, pt was made DNR/DNI by Neurosurgery team, spoke with Neurosurgery ALEX Devine who noted that family is leaning toward comfort measures only pending discussion with palliative care team, for now family is ok with medical management. Pt seen and examined at bedside, exam pertinent for abdominal distension and tenderness/guarding, labs reviewed noted for leukocytosis of 29K(15k previously), vitals pertinent for tachycardia, BP stable. blood cultures sent and pt started on IV zosyn.     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  -s/p Left cranio for bx  -cont Decadron 2mg q 6  -awaiting liver bx result  -Oncology/Neurosurg following   -palliative care consulted     Candidiasis Esophagitis/Gastritis  -s/p EGD/EUS   -cont with Diflucan  -GI following     Acute urinary retention   -cont ortega cath      HTN/tachycardia   -cont metoprolol with holding parameter     DVT ppx  -scd, Lovenox on hold      80yo Female with hx of HTN, [place] [facility] [place], with ongoing sx of feeling unwell x 1 month, admitted to the NEURO sx service with corpus callosal lesion, now s/p craniotomy for biopsy 1/19/22, postoperative course complicated with hemorrhage in surgical bed/biopsy tract. Also noted on ct/ MRI AP showed mild MS LADs, and left hepatic mass. MRI abd showed left hepatic lobe atrophy and mild intrahepatic dilatation involving segments 2 and 3. Suspicion for stricturing mass at the junction of the medial and lateral left lobes. Pt s/p EGD/EUS guided liver biopsy showing Lesion of liver, Candidiasis of the esophagus, Gastritis, started on Diflucan. Pt was found to have abdominal distension subsequently on exam yesterday with xray showing distension of the stomach and NGT was placed for decompression. CT a/p was ordered which came back this evening pertinent for new moderate to large ascites which appears simple,  large pneumoperitoneum without evident source and ascending colitis. Surgical consult was placed by primary neurosurgical team for further management. As per note surgery team spoke with family extensively regarding surgical intervention and family decided to not move forward with any surgical interventions as they considering comfort/palliative measures. Medicine team called for transfer of service to medicine. multiple goal of care discussion noted in the chart, pt was made DNR/DNI by Neurosurgery team, spoke with Neurosurgery ALEX Devine who noted that family is leaning toward comfort measures only pending discussion with palliative care team, for now family is ok with medical management. Pt seen and examined at bedside, exam pertinent for abdominal distension and tenderness/guarding, labs reviewed noted for leukocytosis of 29K(15k previously), vitals pertinent for tachycardia, BP stable. blood cultures sent and pt started on IV zosyn.     Pneumoperitoneum/Ascending colitis   -Surgical intervention deferred as per surgery team discussion with family at this time   -labs reviewed noted for leukocytosis of 29K(15k previously)  -Started on IV zosyn, blood cultures sent  -keep NPO    -hemodynamically stable at this time  -cont with tele monitoring, pt high risk of decompensation    -Pt was made DNR/DNI, family leaning toward comfort measures only with pending discussion with palliative team  -Surgery/neurosurgey following     DLBCL in Brain/primary CNS Lymphoma/hemorrhage   -s/p Left cranio for bx  -cont Decadron 2mg q 6  -cont BP control with Labetalol and hydralazine prn for BP>140   -awaiting liver bx result  -Oncology/Neurosurg following   -palliative care consulted     Candidiasis Esophagus/Gastritis  -s/p EGD/EUS   -cont with Diflucan  -cont protonix   -GI following     Acute urinary retention   -cont ortega cath      HTN/tachycardia   -cont metoprolol with holding parameter     DVT ppx  -scd, Lovenox on hold

## 2022-02-03 NOTE — CONSULT NOTE ADULT - REASON FOR ADMISSION
Brain mass

## 2022-02-03 NOTE — GOALS OF CARE CONVERSATION - ADVANCED CARE PLANNING - CONVERSATION DETAILS
Met with patient and her daughter to further discuss goals of care. Present in the meeting was GI doctor Korey Morales as well. Patient's daughter was leaning toward Comfort Measures Only yesterday. Daughter clearly states that she would not want patient to go through exploratory laparotomy to look for source of abdominal distention/peritonitis. Dr Morales and myself explained to them in detail that another option could be medical management with continuation of NGT to suction, antibiotics, PPI, NPO status. Of note, patient's abdominal distention and tenderness have greatly improved since yesterday when NGT was placed. Daughter agrees to this plan of care and is opting out of Comfort Measures status at this time. She agrees with plan of care to observe and monitor patient for improvement over next few days. Daughter and patient wish to remain DNR/DNI status at this time. Other than that, they would want full medical management at this time.

## 2022-02-03 NOTE — CHART NOTE - NSCHARTNOTEFT_GEN_A_CORE
Neurosurgery PA received a phone call from Dr. Dax Kolb from [facility] service regarding the CT scan revealing free air and hemorrhage in Mrs. Lyn's abdomen. Dr. Kolb stated that the free air is most likely due to perforation of the stomach during the liver bx and will seed the patient's abdomen with GI rachel likely resulting in a septic picture within the next 72h. Dr. Kolb reached out to the patient's daughter Ewa Cleaning and discussed the entirely of the patient's clinical conditions and stated that the only possibility to reconcile the free air would be for an exploratory laparotomy, however he doesn't believe that she is a good candidate and there is no guarantee that they could find the perforation or fix it. The risk of an exploratory laparotomy was explained to the family and neurosurgical provider as far outweighing the benefits. Dr. Kolb also brought up comfort care to the family and relayed that message to the neurosurgery provider.     Neurosurgery provider contacted Ewa Cleaning (patient's daughter) regarding the nature of the clinical situation, conversation with Dr. Kolb and goals of care. It was stated that the family's wishes were to not pursue any surgical intervention or any intervention that would cause any pain or discomfort to the patient. A full discussion was had regarding the nature of comfort care and likely trajectory of the patient's clinical course. Family decided that they would make her DNR/DNI and comfort measures. Patient's clinical status upon examination was stable and patient was awake, alert and mentating well. She did not appear in any distress on examination. MOLST form was filled out as per family's wishes and placed in the chart. A palliative care consult was placed and we are awaiting palliative recommendations. As for tonight we will proceed with medical management within the realm of discussion and maintain the patient's stability. Further conversations with the attending physician, family and palliative care team will be conducted during the morning hours.

## 2022-02-03 NOTE — CONSULT NOTE ADULT - CONSULT REASON
Medical Management
Medicine co management
Pneumoperitoneum
Brain mass
Post op L craniotomy for tumor biopsy
Trauma clearance
lymphoma
goals of care
code stroke
liver lesion

## 2022-02-03 NOTE — CHART NOTE - NSCHARTNOTEFT_GEN_A_CORE
Source: Patient [ ]  Family [ ]   other [x]    Current Diet:   Diet, NPO:   Except Medications (02-02-22 @ 13:28)    Current Weight:   (1/31) 133 lbs RD bedscale weight  (1/22) 128.7 lbs  (1/21) 136.2 lbs  (1/18) 140.4 lbs  (1/17) 141.3 lbs  (1/15) 135.5 lbs    % Weight Change: No recent weight documented     Pertinent Medications: MEDICATIONS  (STANDING):  dexAMETHasone  Injectable   IV Push   dexAMETHasone  Injectable 2 milliGRAM(s) IV Push every 6 hours  dextrose 40% Gel 15 Gram(s) Oral once  dextrose 5%. 1000 milliLiter(s) (50 mL/Hr) IV Continuous <Continuous>  dextrose 5%. 1000 milliLiter(s) (100 mL/Hr) IV Continuous <Continuous>  dextrose 50% Injectable 25 Gram(s) IV Push once  dextrose 50% Injectable 12.5 Gram(s) IV Push once  dextrose 50% Injectable 25 Gram(s) IV Push once  doxazosin 4 milliGRAM(s) Oral at bedtime  fluconAZOLE IVPB 100 milliGRAM(s) IV Intermittent every 24 hours  glucagon  Injectable 1 milliGRAM(s) IntraMuscular once  HYDROmorphone  Injectable 0.3 milliGRAM(s) IV Push every 4 hours  insulin lispro (ADMELOG) corrective regimen sliding scale   SubCutaneous Before meals and at bedtime  melatonin 5 milliGRAM(s) Oral at bedtime  metoprolol tartrate 25 milliGRAM(s) Oral two times a day  pantoprazole  Injectable 40 milliGRAM(s) IV Push every 12 hours  piperacillin/tazobactam IVPB.. 3.375 Gram(s) IV Intermittent every 8 hours  polyethylene glycol 3350 17 Gram(s) Oral two times a day  senna 2 Tablet(s) Oral at bedtime  sodium chloride 1 Gram(s) Oral three times a day  sodium chloride 0.9%. 1000 milliLiter(s) (100 mL/Hr) IV Continuous <Continuous>  valproic acid 250 milliGRAM(s) Oral two times a day    MEDICATIONS  (PRN):  acetaminophen     Tablet .. 650 milliGRAM(s) Oral every 6 hours PRN Mild Pain (1 - 3)  acetaminophen   IVPB .. 1000 milliGRAM(s) IV Intermittent once PRN Temp greater or equal to 38C (100.4F)  hydrALAZINE Injectable 10 milliGRAM(s) IV Push every 2 hours PRN SBP > 140  HYDROmorphone  Injectable 0.3 milliGRAM(s) IV Push every 4 hours PRN RR > 24, work of breathing, pain  labetalol Injectable 10 milliGRAM(s) IV Push every 2 hours PRN SBP > 140  LORazepam   Injectable 0.5 milliGRAM(s) IV Push every 4 hours PRN Anxiety  OLANZapine 2.5 milliGRAM(s) Oral every 12 hours PRN agitation  ondansetron Injectable 4 milliGRAM(s) IV Push every 6 hours PRN Nausea and/or Vomiting    Pertinent Labs: CBC Full  -  ( 03 Feb 2022 04:18 )  WBC Count : 30.54 K/uL  RBC Count : 4.59 M/uL  Hemoglobin : 14.2 g/dL  Hematocrit : 42.1 %  Platelet Count - Automated : 154 K/uL  Mean Cell Volume : 91.7 fl  Mean Cell Hemoglobin : 30.9 pg  Mean Cell Hemoglobin Concentration : 33.7 gm/dL  Auto Neutrophil # : 27.61 K/uL  Auto Lymphocyte # : 0.27 K/uL  Auto Monocyte # : 0.27 K/uL  Auto Eosinophil # : 0.00 K/uL  Auto Basophil # : 0.00 K/uL  Auto Neutrophil % : 73.9 %  Auto Lymphocyte % : 0.9 %  Auto Monocyte % : 0.9 %  Auto Eosinophil % : 0.0 %  Auto Basophil % : 0.0 %    02-03 Na139 mmol/L Glu 136 mg/dL<H> K+ 4.9 mmol/L Cr  0.71 mg/dL BUN 42.4 mg/dL<H> Phos n/a   Alb 2.1 g/dL<L> PAB n/a       Skin: sx incision L craniotomy, skin tear L elbow    Nutrition focused physical exam conducted - found signs of malnutrition [ ]absent [x ]present    Subcutaneous fat loss: [ ] Orbital fat pads region, [ ]Buccal fat region, [ ]Triceps region,  [ ]Ribs region    Muscle wasting: [ x]Temples region, [ ]Clavicle region, [ ]Shoulder region, [ ]Scapula region, [ ]Interosseous region,  [ ]thigh region, [ ]Calf region    Estimated Needs:   [x ] no change since previous assessment  [ ] recalculated:     Current Nutrition Diagnosis: Pt presents at high nutrition risk secondary to malnutrition (moderate acute) related to inability to meet sufficient protein-energy needs in setting of UTI, brain mass and liver lesion as evidenced by meeting <75% EER >7 days, moderate temporal wasting. Aware Pt s/p L craniotomy (1/19), POD #12 postoperative course complicated with hemorrhage in surgical bed/biopsy tract. Pt NPO. Pt noted with large ascites, family declining surgical intervention. Pt on comfort measures.     Recommendations:     Monitoring and Evaluation:   [ ] PO intake [ ] Tolerance to diet prescription [X] Weights  [X] Follow up per protocol [X] Labs: Source: Patient [ ]  Family [ ]   other [x]    Current Diet:   Diet, NPO:   Except Medications (02-02-22 @ 13:28)    Current Weight:   (1/31) 133 lbs RD bedscale weight  (1/22) 128.7 lbs  (1/21) 136.2 lbs  (1/18) 140.4 lbs  (1/17) 141.3 lbs  (1/15) 135.5 lbs    % Weight Change: No recent weight documented     Pertinent Medications: MEDICATIONS  (STANDING):  dexAMETHasone  Injectable   IV Push   dexAMETHasone  Injectable 2 milliGRAM(s) IV Push every 6 hours  dextrose 40% Gel 15 Gram(s) Oral once  dextrose 5%. 1000 milliLiter(s) (50 mL/Hr) IV Continuous <Continuous>  dextrose 5%. 1000 milliLiter(s) (100 mL/Hr) IV Continuous <Continuous>  dextrose 50% Injectable 25 Gram(s) IV Push once  dextrose 50% Injectable 12.5 Gram(s) IV Push once  dextrose 50% Injectable 25 Gram(s) IV Push once  doxazosin 4 milliGRAM(s) Oral at bedtime  fluconAZOLE IVPB 100 milliGRAM(s) IV Intermittent every 24 hours  glucagon  Injectable 1 milliGRAM(s) IntraMuscular once  HYDROmorphone  Injectable 0.3 milliGRAM(s) IV Push every 4 hours  insulin lispro (ADMELOG) corrective regimen sliding scale   SubCutaneous Before meals and at bedtime  melatonin 5 milliGRAM(s) Oral at bedtime  metoprolol tartrate 25 milliGRAM(s) Oral two times a day  pantoprazole  Injectable 40 milliGRAM(s) IV Push every 12 hours  piperacillin/tazobactam IVPB.. 3.375 Gram(s) IV Intermittent every 8 hours  polyethylene glycol 3350 17 Gram(s) Oral two times a day  senna 2 Tablet(s) Oral at bedtime  sodium chloride 1 Gram(s) Oral three times a day  sodium chloride 0.9%. 1000 milliLiter(s) (100 mL/Hr) IV Continuous <Continuous>  valproic acid 250 milliGRAM(s) Oral two times a day    MEDICATIONS  (PRN):  acetaminophen     Tablet .. 650 milliGRAM(s) Oral every 6 hours PRN Mild Pain (1 - 3)  acetaminophen   IVPB .. 1000 milliGRAM(s) IV Intermittent once PRN Temp greater or equal to 38C (100.4F)  hydrALAZINE Injectable 10 milliGRAM(s) IV Push every 2 hours PRN SBP > 140  HYDROmorphone  Injectable 0.3 milliGRAM(s) IV Push every 4 hours PRN RR > 24, work of breathing, pain  labetalol Injectable 10 milliGRAM(s) IV Push every 2 hours PRN SBP > 140  LORazepam   Injectable 0.5 milliGRAM(s) IV Push every 4 hours PRN Anxiety  OLANZapine 2.5 milliGRAM(s) Oral every 12 hours PRN agitation  ondansetron Injectable 4 milliGRAM(s) IV Push every 6 hours PRN Nausea and/or Vomiting    Pertinent Labs: CBC Full  -  ( 03 Feb 2022 04:18 )  WBC Count : 30.54 K/uL  RBC Count : 4.59 M/uL  Hemoglobin : 14.2 g/dL  Hematocrit : 42.1 %  Platelet Count - Automated : 154 K/uL  Mean Cell Volume : 91.7 fl  Mean Cell Hemoglobin : 30.9 pg  Mean Cell Hemoglobin Concentration : 33.7 gm/dL  Auto Neutrophil # : 27.61 K/uL  Auto Lymphocyte # : 0.27 K/uL  Auto Monocyte # : 0.27 K/uL  Auto Eosinophil # : 0.00 K/uL  Auto Basophil # : 0.00 K/uL  Auto Neutrophil % : 73.9 %  Auto Lymphocyte % : 0.9 %  Auto Monocyte % : 0.9 %  Auto Eosinophil % : 0.0 %  Auto Basophil % : 0.0 %    02-03 Na139 mmol/L Glu 136 mg/dL<H> K+ 4.9 mmol/L Cr  0.71 mg/dL BUN 42.4 mg/dL<H> Phos n/a   Alb 2.1 g/dL<L> PAB n/a       Skin: sx incision L craniotomy, skin tear L elbow    Nutrition focused physical exam conducted - found signs of malnutrition [ ]absent [x ]present    Subcutaneous fat loss: [ ] Orbital fat pads region, [ ]Buccal fat region, [ ]Triceps region,  [ ]Ribs region    Muscle wasting: [ x]Temples region, [ ]Clavicle region, [ ]Shoulder region, [ ]Scapula region, [ ]Interosseous region,  [ ]thigh region, [ ]Calf region    Estimated Needs:   [x ] no change since previous assessment  [ ] recalculated:     Current Nutrition Diagnosis: Pt presents at high nutrition risk secondary to malnutrition (moderate acute) related to inability to meet sufficient protein-energy needs in setting of UTI, brain mass and liver lesion as evidenced by meeting <75% EER >7 days, moderate temporal wasting. Aware Pt s/p L craniotomy (1/19), POD #12 postoperative course complicated with hemorrhage in surgical bed/biopsy tract. Pt NPO. Pt noted with large ascites, family declining surgical intervention. Pt on comfort measures.     Recommendations:   RD to respect Pt/family wishes regarding GOC and alternate means nutrition/hydration   RD to remain available PRN    Monitoring and Evaluation:   [ ] PO intake [ ] Tolerance to diet prescription [X] Weights  [X] Follow up per protocol [X] Labs:

## 2022-02-04 NOTE — PROGRESS NOTE ADULT - ASSESSMENT
82 yo female with a history of HTN, presents [place] [facility] [place] [facility] [place] that was found on imaging, following her presentation there for a trip and fall.  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  Postoperatively, patient with stroke-like symptoms, CT head showed acute hemorrhage along the biopsy tract in L parietal lobe extending to the mass within the posterior corpus callosum.   An MRI of the abdomen showed stricturing mass at the junction of the medial and lateral left lobes. 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  S/p Liver Biopsy on 2/1/22.    Decision made to focus on comfort, receiving comfort measures only.      #dyspnea/pain:  -ascites may contribute to work of breathing in the patient.    -DLBCL, liver mass may be sources of pain for the patient.  -maintain dilaudid 0.3 mg IV q 4 hrs around the clock: RN to assess patient and administer dilaudid if RR  is > 24, if there is evidence of work of breathing or pain.  Patient may decline dilaudid.  -maintain dilaudid 0.3 mg IV q 4 hrs PRN ( RR > 24, evidence of pain or work of breathing)    #encephalopathy:  -Likely multifactorial in the setting of recent surgery, malignancy, leukocytosis  -Patient is at high risk for delirium.  Provide reassurance, re-orientation, day/night distinction.  Compartmentalize care, avoid interruptions of sleep.  -maintain lorazepam 0.5 mg IV q 4 hrs PRN (anxiety)  -on olanzapine prn, per primary team    #constipation:  -patient does not appear able to take PO at this time.  -would d/c oral laxatives  -would start bisacodyl 10 mg ND suppository daily PRN (no BM in >48 hrs)    #DLBCL, liver mass:  -no planned tx    #debility  -supportive care    #palliative care encounter:  -DNR/I/comfort measures only  -No HCP on file; patient's children are equal surrogates. Gm is acting as spokesperson for pt's children.  -Though inpatient hospice appropriate, worry that patient may be too unstable for transfer at this time: RR was close to 20 at start of shift, per RN, now between 10 and 12.  Appears to be actively dying.  -Encouraged Gm to speak to RN re: any symptoms of concern.  Emotional support provided.  -Prognosis likely hours-short days.    Patient reviewed with primary team, logistics.  Palliative care will continue to follow.     82 yo female with a history of HTN, presents to [facility] as a transfer from [facility] [place] for work up of a brain mass that was found on imaging, following her presentation there for a trip and fall.  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  Postoperatively, patient with stroke-like symptoms, CT head showed acute hemorrhage along the biopsy tract in L parietal lobe extending to the mass within the posterior corpus callosum.   An MRI of the abdomen showed stricturing mass at the junction of the medial and lateral left lobes. 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  S/p Liver Biopsy on 2/1/22.    Decision made to focus on comfort, receiving comfort measures only.      #dyspnea/pain:  -ascites may contribute to work of breathing in the patient.    -DLBCL, liver mass may be sources of pain for the patient.  -d/c dilaudid 0.3 mg IV q 4 hrs around the clock  -start dilaudid drip at 0.1 mg/hr  -would increase frequency of PRN dilaudid to dilaudid 0.3 mg IV q 1 hrs PRN ( RR > 24, evidence of pain or work of breathing), as patient appears to be actively dying, and want to ensure availability of analgesia to manage symptoms that may develop    #encephalopathy:  -Likely multifactorial in the setting of recent surgery, malignancy, leukocytosis  -Patient is at high risk for delirium.  Provide reassurance, re-orientation, day/night distinction.  Compartmentalize care, avoid interruptions of sleep.  -maintain lorazepam 0.5 mg IV q 4 hrs PRN (anxiety)  -on olanzapine prn, per primary team    #constipation:  -patient does not appear able to take PO at this time.  -would d/c oral laxatives  -would start bisacodyl 10 mg MA suppository daily PRN (no BM in >48 hrs)    #DLBCL, liver mass:  -no planned tx    #debility  -supportive care    #palliative care encounter:  -DNR/I/comfort measures only  -No HCP on file; patient's children are equal surrogates. Gm is acting as spokesperson for pt's children.  -Though inpatient hospice appropriate, worry that patient may be too unstable for transfer at this time: RR was close to 20 at start of shift, per RN, now between 10 and 12.  Appears to be actively dying.  -Encouraged Gm to speak to RN re: any symptoms of concern.  Emotional support provided.  -Prognosis likely hours-short days.    Patient reviewed with primary team: attending, RN, ANM, as well as logistics.  Palliative care will continue to follow.    Total time spent: 35 min, > 50% spent in counseling and care coordination.

## 2022-02-04 NOTE — PROGRESS NOTE ADULT - SUBJECTIVE AND OBJECTIVE BOX
[facility] Division of Hospital Medicine    Chief Complaint:      SUBJECTIVE / OVERNIGHT EVENTS:    Pt appears comfortable   All meds were discontinued  as per family request except comfort meds     MEDICATIONS  (STANDING):  HYDROmorphone  Injectable 0.3 milliGRAM(s) IV Push every 4 hours  polyethylene glycol 3350 17 Gram(s) Oral two times a day  senna 2 Tablet(s) Oral at bedtime    MEDICATIONS  (PRN):  acetaminophen     Tablet .. 650 milliGRAM(s) Oral every 6 hours PRN Mild Pain (1 - 3)  acetaminophen   IVPB .. 1000 milliGRAM(s) IV Intermittent once PRN Temp greater or equal to 38C (100.4F)  HYDROmorphone  Injectable 0.3 milliGRAM(s) IV Push every 4 hours PRN RR > 24, work of breathing, pain  LORazepam   Injectable 0.5 milliGRAM(s) IV Push every 4 hours PRN Anxiety  OLANZapine 2.5 milliGRAM(s) Oral every 12 hours PRN agitation  ondansetron Injectable 4 milliGRAM(s) IV Push every 6 hours PRN Nausea and/or Vomiting        I&O's Summary    03 Feb 2022 07:01  -  04 Feb 2022 07:00  --------------------------------------------------------  IN: 0 mL / OUT: 300 mL / NET: -300 mL        PHYSICAL EXAM:  Vital Signs Last 24 Hrs  T(C): 37.9 (03 Feb 2022 11:16), Max: 37.9 (03 Feb 2022 11:16)  T(F): 100.3 (03 Feb 2022 11:16), Max: 100.3 (03 Feb 2022 11:16)  HR: 120 (03 Feb 2022 11:16) (112 - 120)  BP: 112/74 (03 Feb 2022 10:58) (112/74 - 112/74)  BP(mean): --  RR: 18 (03 Feb 2022 10:58) (18 - 18)  SpO2: 93% (03 Feb 2022 10:58) (93% - 93%)        CONSTITUTIONAL: lady laying comfortable in bed next to daughter at bedside   ENMT: Moist oral mucosa, no pharyngeal injection or exudates; normal dentition  RESPIRATORY: Normal respiratory effort; lungs are clear to auscultation bilaterally  CARDIOVASCULAR: Regular rate and rhythm, normal S1 and S2, no murmur/rub/gallop; No lower extremity edema; Peripheral pulses are 2+ bilaterally  ABDOMEN: distented , decrease bowel sounds   MUSCLOSKELETAL:  Normal gait; no clubbing or cyanosis of digits; no joint swelling or tenderness to palpation  PSYCH: lethargic   NEUROLOGY: CN 2-12 are intact and symmetric; no gross sensory deficits;   SKIN: No rashes; no palpable lesions    LABS:                        14.2   30.54 )-----------( 154      ( 03 Feb 2022 04:18 )             42.1     02-03    139  |  104  |  42.4<H>  ----------------------------<  136<H>  4.9   |  22.0  |  0.71    Ca    9.2      03 Feb 2022 04:18    TPro  5.0<L>  /  Alb  2.1<L>  /  TBili  0.5  /  DBili  x   /  AST  22  /  ALT  27  /  AlkPhos  94  02-03    PT/INR - ( 02 Feb 2022 22:11 )   PT: 14.7 sec;   INR: 1.28 ratio                   Culture - Other (collected 01 Feb 2022 14:52)  Source: .Other Esophageal Ranger  Final Report (02 Feb 2022 16:42):    Culture yields growth of greater than 3 colony types of    Call client services within 7 days if further workup is clinically    indicated.      CAPILLARY BLOOD GLUCOSE            RADIOLOGY & ADDITIONAL TESTS:  Results Reviewed:   Imaging Personally Reviewed:  Electrocardiogram Personally Reviewed:      80yo Female with hx of HTN, transferred from [facility] [place] after s/p mechanical fall, with ongoing sx of feeling unwell x 1 month, admitted to the NEURO sx service with corpus callosal lesion, now s/p craniotomy for biopsy 1/19/22, postoperative course complicated with hemorrhage in surgical bed/biopsy tract. Also noted on ct/ MRI AP showed mild MS LADs, and left hepatic mass. MRI abd showed left hepatic lobe atrophy and mild intrahepatic dilatation involving segments 2 and 3. Suspicion for stricturing mass at the junction of the medial and lateral left lobes. Pt s/p EGD/EUS guided liver biopsy showing Lesion of liver, Candidiasis of the esophagus, Gastritis, started on Diflucan. Pt was found to have abdominal distension subsequently on exam yesterday with xray showing distension of the stomach and NGT was placed for decompression. CT a/p was ordered which came back this evening pertinent for new moderate to large ascites which appears simple,  large pneumoperitoneum without evident source and ascending colitis. Surgical consult was placed by primary neurosurgical team for further management.     GOAL OF CARE   Had lengthy discussion with family along with palliative care team and RN staff 2/3 , Initially they wanted DNR/DNI with medical management with continuation of NGT to suction, antibiotics, PPI, NPO status . However after meeting with palliative care , Daughter ( HCP [employer] ) said she " changed her mind after discussion with siblings . Family  wishes to pursue entirely comfort care for now   (  NO IVF , NO ANTIBIOTICS , NO STEROIDS , NO BLOOD DRAW )  Family understand patient will likely pass away  if all meds are taken off .. They would also like to pursue hospice for now . Discussed with palliative care  NP Danette as well as RN and Nurse wil [place] at bedside .   Will stop ALL MEDS excepts comforts meds like morphine , lorazepam and laxatives   HCP Gm agree with the plan   PT IS DNR/DNR COMFORT CARE ONLY   F/U hospice eval today

## 2022-02-04 NOTE — PROGRESS NOTE ADULT - SUBJECTIVE AND OBJECTIVE BOX
Palliative care follow up:    OVERNIGHT EVENTS:   -Four of five scheduled dilaudid doses given: last dose: 07:26  -Two PRN doses of dilaudid given: none today  -One PRN dose of lorazepam given today: 0449, otherwise, no PRN doses given.    CC:     Present Symptoms:   Dyspnea: 0 1 2 3   Nausea/Vomiting: Yes No  Anxiety:  Yes No  Depression: Yes No  Fatigue: Yes No  Loss of appetite: Yes No  Constipation: Yes No    Pain:             Character-            Duration-            Effect-            Factors-            Frequency-            Location-            Severity-    Review of Systems: Reviewed  Per HPI all other ROS negative  Unable to obtain due to poor mentation     MEDICATIONS  (STANDING):  HYDROmorphone  Injectable 0.3 milliGRAM(s) IV Push every 4 hours  polyethylene glycol 3350 17 Gram(s) Oral two times a day  senna 2 Tablet(s) Oral at bedtime    MEDICATIONS  (PRN):  acetaminophen     Tablet .. 650 milliGRAM(s) Oral every 6 hours PRN Mild Pain (1 - 3)  acetaminophen   IVPB .. 1000 milliGRAM(s) IV Intermittent once PRN Temp greater or equal to 38C (100.4F)  HYDROmorphone  Injectable 0.3 milliGRAM(s) IV Push every 4 hours PRN RR > 24, work of breathing, pain  LORazepam   Injectable 0.5 milliGRAM(s) IV Push every 4 hours PRN Anxiety  OLANZapine 2.5 milliGRAM(s) Oral every 12 hours PRN agitation  ondansetron Injectable 4 milliGRAM(s) IV Push every 6 hours PRN Nausea and/or Vomiting      PHYSICAL EXAM:      Vital Signs Last 24 Hrs  T(C): 37.9 (03 Feb 2022 11:16), Max: 37.9 (03 Feb 2022 11:16)  T(F): 100.3 (03 Feb 2022 11:16), Max: 100.3 (03 Feb 2022 11:16)  HR: 120 (03 Feb 2022 11:16) (114 - 120)  BP: 112/74 (03 Feb 2022 10:58) (112/74 - 112/74)  BP(mean): --  RR: 18 (03 Feb 2022 10:58) (18 - 18)  SpO2: 93% (03 Feb 2022 10:58) (93% - 93%)    General: alert  oriented x ____ lethargic agitated                  cachexia  nonverbal  coma    Karnofsky:  %    HEENT: normal  dry mouth  ET tube/trach    Lungs: comfortable tachypnea/labored breathing  excessive secretions    CV: normal  tachycardia    GI: normal  distended  tender  no BS               PEG/NG/OG tube  constipation  last BM:     : normal  incontinent  oliguria/anuria  ortega    MSK: normal  weakness  edema             ambulatory  bedbound/wheelchair bound    Skin: normal  pressure ulcers- Stage_____  no rash    LABS:                          14.2   30.54 )-----------( 154      ( 03 Feb 2022 04:18 )             42.1     02-03    139  |  104  |  42.4<H>  ----------------------------<  136<H>  4.9   |  22.0  |  0.71    Ca    9.2      03 Feb 2022 04:18    TPro  5.0<L>  /  Alb  2.1<L>  /  TBili  0.5  /  DBili  x   /  AST  22  /  ALT  27  /  AlkPhos  94  02-03    PT/INR - ( 02 Feb 2022 22:11 )   PT: 14.7 sec;   INR: 1.28 ratio             I&O's Summary    03 Feb 2022 07:01  -  04 Feb 2022 07:00  --------------------------------------------------------  IN: 0 mL / OUT: 300 mL / NET: -300 mL        RADIOLOGY & ADDITIONAL STUDIES:    ADVANCE DIRECTIVES:   DNR YES NO  Completed on:                     MOLST  YES NO   Completed on:  Living Will  YES NO   Completed on:     Palliative care follow up:    OVERNIGHT EVENTS:     No acute events overnight.    -Five of six scheduled dilaudid doses given in the last 24 hrs: last dose: 10:27  -Two PRN doses of dilaudid given: none today  -One PRN dose of lorazepam given today: 0449, otherwise, no PRN doses given.    Per RN, patient has been comfortable, but notes that the patient appears different today.  Less interactive.  RR at start of shift, per RN, close to 20.    Patient seen and examined at 11:15 am.  Daughter, Gm, at bedside, son, Dakota, on the phone.  Daughter notes the patient has been comfortable.  States that she has been in contact with hospital staff re: inpatient hospice, but worries about transfer in this moment, given that the patient has been mostly unresponsive.    Unable to obtain HPI, ROS from patient 2/2 mental status.  Not responsive to verbal stim.  Opens eyes spontaneously, and does not answer questions at the time she awakens.        MEDICATIONS  (STANDING):  HYDROmorphone  Injectable 0.3 milliGRAM(s) IV Push every 4 hours  polyethylene glycol 3350 17 Gram(s) Oral two times a day  senna 2 Tablet(s) Oral at bedtime    MEDICATIONS  (PRN):  acetaminophen     Tablet .. 650 milliGRAM(s) Oral every 6 hours PRN Mild Pain (1 - 3)  acetaminophen   IVPB .. 1000 milliGRAM(s) IV Intermittent once PRN Temp greater or equal to 38C (100.4F)  HYDROmorphone  Injectable 0.3 milliGRAM(s) IV Push every 4 hours PRN RR > 24, work of breathing, pain  LORazepam   Injectable 0.5 milliGRAM(s) IV Push every 4 hours PRN Anxiety  OLANZapine 2.5 milliGRAM(s) Oral every 12 hours PRN agitation  ondansetron Injectable 4 milliGRAM(s) IV Push every 6 hours PRN Nausea and/or Vomiting      PHYSICAL EXAM:      Vital Signs Last 24 Hrs  T(C): 37.9 (03 Feb 2022 11:16), Max: 37.9 (03 Feb 2022 11:16)  T(F): 100.3 (03 Feb 2022 11:16), Max: 100.3 (03 Feb 2022 11:16)  HR: 120 (03 Feb 2022 11:16) (114 - 120)  BP: 112/74 (03 Feb 2022 10:58) (112/74 - 112/74)  BP(mean): --  RR: 18 (03 Feb 2022 10:58) (18 - 18)  SpO2: 93% (03 Feb 2022 10:58) (93% - 93%)    Karnofsky: 10 %    Gen: In NAD.  No pain behaviors or work of breathing noted  Neuro: not responsive to verbal stim.  Awakens spontaneously, non-verbal at the time that she awakens.  Eyes: sclerae non-icteric  ENT: moist mucosa  Resp: unlabored, RR: 10-12  Abd: softly distended  : ortega bagwithout gross hematuria  Peripheral Vasc: warm   Int: warm and dry  Psych: no psychomotor agitation    LABS:                          14.2   30.54 )-----------( 154      ( 03 Feb 2022 04:18 )             42.1     02-03    139  |  104  |  42.4<H>  ----------------------------<  136<H>  4.9   |  22.0  |  0.71    Ca    9.2      03 Feb 2022 04:18    TPro  5.0<L>  /  Alb  2.1<L>  /  TBili  0.5  /  DBili  x   /  AST  22  /  ALT  27  /  AlkPhos  94  02-03    PT/INR - ( 02 Feb 2022 22:11 )   PT: 14.7 sec;   INR: 1.28 ratio             I&O's Summary    03 Feb 2022 07:01  -  04 Feb 2022 07:00  --------------------------------------------------------  IN: 0 mL / OUT: 300 mL / NET: -300 mL        RADIOLOGY & ADDITIONAL STUDIES: no new studies    ADVANCE DIRECTIVES: DNR/I/comfort measures only       Palliative care follow up:    OVERNIGHT EVENTS:     No acute events overnight.    -Five of six scheduled dilaudid doses given in the last 24 hrs: last dose: 10:27  -Two PRN doses of dilaudid given: none today  -One PRN dose of lorazepam given today: 0449, otherwise, no PRN doses given.    Per RN, patient has been comfortable, but notes that the patient appears different today.  Less interactive.  RR at start of shift, per RN, close to 20.    Patient seen and examined at 11:15 am.  Daughter, Gm, at bedside, son, Dakota, on the phone.  Daughter notes the patient has been comfortable.  States that she has been in contact with hospital staff re: inpatient hospice, but worries about transfer in this moment, given that the patient has been mostly unresponsive.    Unable to obtain HPI, ROS from patient 2/2 mental status.  Not responsive to verbal stim.  Opens eyes spontaneously, and does not answer questions at the time she awakens.        MEDICATIONS  (STANDING):  HYDROmorphone  Injectable 0.3 milliGRAM(s) IV Push every 4 hours  polyethylene glycol 3350 17 Gram(s) Oral two times a day  senna 2 Tablet(s) Oral at bedtime    MEDICATIONS  (PRN):  acetaminophen     Tablet .. 650 milliGRAM(s) Oral every 6 hours PRN Mild Pain (1 - 3)  acetaminophen   IVPB .. 1000 milliGRAM(s) IV Intermittent once PRN Temp greater or equal to 38C (100.4F)  HYDROmorphone  Injectable 0.3 milliGRAM(s) IV Push every 4 hours PRN RR > 24, work of breathing, pain  LORazepam   Injectable 0.5 milliGRAM(s) IV Push every 4 hours PRN Anxiety  OLANZapine 2.5 milliGRAM(s) Oral every 12 hours PRN agitation  ondansetron Injectable 4 milliGRAM(s) IV Push every 6 hours PRN Nausea and/or Vomiting      PHYSICAL EXAM:      Vital Signs Last 24 Hrs  T(C): 37.9 (03 Feb 2022 11:16), Max: 37.9 (03 Feb 2022 11:16)  T(F): 100.3 (03 Feb 2022 11:16), Max: 100.3 (03 Feb 2022 11:16)  HR: 120 (03 Feb 2022 11:16) (114 - 120)  BP: 112/74 (03 Feb 2022 10:58) (112/74 - 112/74)  BP(mean): --  RR: 18 (03 Feb 2022 10:58) (18 - 18)  SpO2: 93% (03 Feb 2022 10:58) (93% - 93%)    Karnofsky: 10 %    Gen: In NAD.  No pain behaviors or work of breathing noted  Neuro: not responsive to verbal stim.  Awakens spontaneously, non-verbal at the time that she awakens.  Eyes: sclerae non-icteric  ENT: moist mucosa  Resp: unlabored, RR: 10-12  Abd: softly distended  : ortega bag without gross hematuria  Peripheral Vasc: warm   Int: warm and dry  Psych: no psychomotor agitation    LABS:                          14.2   30.54 )-----------( 154      ( 03 Feb 2022 04:18 )             42.1     02-03    139  |  104  |  42.4<H>  ----------------------------<  136<H>  4.9   |  22.0  |  0.71    Ca    9.2      03 Feb 2022 04:18    TPro  5.0<L>  /  Alb  2.1<L>  /  TBili  0.5  /  DBili  x   /  AST  22  /  ALT  27  /  AlkPhos  94  02-03    PT/INR - ( 02 Feb 2022 22:11 )   PT: 14.7 sec;   INR: 1.28 ratio             I&O's Summary    03 Feb 2022 07:01  -  04 Feb 2022 07:00  --------------------------------------------------------  IN: 0 mL / OUT: 300 mL / NET: -300 mL        RADIOLOGY & ADDITIONAL STUDIES: no new studies    ADVANCE DIRECTIVES: DNR/I/comfort measures only

## 2022-02-05 NOTE — PROVIDER CONTACT NOTE (OTHER) - REASON
patient c/o 10/10 pain
Patient in pain/distress despite dilaudid gtt and RPN
Pt retaining urine >500 cc, has already exceeded 3 straight cath trials
4 Seconds SVT noted by monitor tech
comfort care status
Pt's SBP >140, no PRN meds due at this time

## 2022-02-05 NOTE — PROGRESS NOTE ADULT - ASSESSMENT
80 y/o F w/ PMH of HTN, brain mass was transfered to [facility] from [facility] for w/u of brain mass.  Pt s/p brain biopsy on 1/19/22, which revealed diffuse large B-cell lymphoma.  Postoperatively pt had stroke-like symptoms and CTH showed acute hemorrhage along the biopsy tract in L-parietal lobe extending to the mass w/in the posterior corpus callosum.   MRI of abdomen showed stricturing mass at the junction of the medial and lateral lobes of liver suspicious for primary biliary neoplasm.  S/p Liver Biopsy on 2/1/22.  Pt now DNR DNI and made comfort care.  Plan was for hospice inn however to unstable to send at this time.       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

## 2022-02-05 NOTE — PROGRESS NOTE ADULT - ASSESSMENT
82 yo female with a history of HTN, presents to [facility] as a transfer from [facility] for work up of a brain mass that was found on imaging, following her presentation there for a trip and fall.  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  Postoperatively, patient with stroke-like symptoms, CT head showed acute hemorrhage along the biopsy tract in L parietal lobe extending to the mass within the posterior corpus callosum. An MRI of the abdomen showed stricturing mass at the junction of the medial and lateral left lobes. 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  S/p Liver Biopsy on 2/1/22.    Symptom-directed care; DNR/DNI     #dyspnea/pain:  -Dilaudid gtt was escalated to 1mg/hr overnight due to refractory symptoms  -ordered Dilaudid 1mg IV q1h PRN for Pain and/or RR >24  -ordered Ativan 1mg IV q3h PRN for Anxiety/Agitation    #encephalopathy:  -Likely multifactorial in the setting of recent surgery, malignancy, leukocytosis  -increased Ativan PRNs as noted above  -discontinued Zyprexa PRNs and ordered Haldol 1mg IV q4h PRN for Refractory Agitation (since there is no reliable PO route)    #constipation:  -c/w bisacodyl 10 mg AR suppository daily PRN (no BM in >48 hrs)    #DLBCL, liver mass:  -no planned tx  -hospice appropriate    #debility  -supportive care    #palliative care encounter:  -DNR/I/comfort measures only  -No HCP on file; patient's children are equal surrogates. Gm is acting as spokesperson for pt's children.  -Though inpatient hospice appropriate, worry that patient may be too unstable for transfer at this time. Appears to be actively dying.  -Reinforced disease trajectory with family, they understand prognosis is hours-days and they reaffirm that the goal is comfort    Chart, labs, imaging reviewed. Discussed with family and RN. 82 yo female with a history of HTN, presents to [facility] as a transfer from [facility] [place] for work up of a brain mass that was found on imaging, following her presentation there for a trip and fall.  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  Postoperatively, patient with stroke-like symptoms, CT head showed acute hemorrhage along the biopsy tract in L parietal lobe extending to the mass within the posterior corpus callosum. An MRI of the abdomen showed stricturing mass at the junction of the medial and lateral left lobes. 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  S/p Liver Biopsy on 2/1/22.    Symptom-directed care; DNR/DNI     #dyspnea/pain:  -Dilaudid gtt was escalated to 1mg/hr overnight due to refractory symptoms  -ordered Dilaudid 1mg IV q1h PRN for Pain and/or RR >24  -ordered Ativan 1mg IV q3h PRN for Anxiety/Agitation    #encephalopathy:  -Likely multifactorial in the setting of recent surgery, malignancy, leukocytosis  -increased Ativan PRNs as noted above  -discontinued Zyprexa PRNs and ordered Haldol 1mg IV q4h PRN for Refractory Agitation (since there is no reliable PO route)    #constipation:  -c/w bisacodyl 10 mg VT suppository daily PRN (no BM in >48 hrs)    #DLBCL, liver mass:  -no planned tx  -hospice appropriate    #debility  -supportive care    #palliative care encounter:  -DNR/I/comfort measures only  -No HCP on file; patient's children are equal surrogates. Gm is acting as spokesperson for pt's children.  -Though inpatient hospice appropriate, patient is not stable for transfer due to increased symptom burden. Appears to be actively dying.  -Reinforced disease trajectory with family, they understand prognosis is hours-days and they reaffirm that the goal is comfort    Chart, labs, imaging reviewed. Discussed with family and RN.

## 2022-02-05 NOTE — PROGRESS NOTE ADULT - SUBJECTIVE AND OBJECTIVE BOX
Chief Complaint:  brain mass    SUBJECTIVE / OVERNIGHT EVENTS: No actue events reported overnight.  Pt this morning writhing in pain despite dilaudid gtt and improved w/ adjustment of gtt rate.          I&O's Summary    04 Feb 2022 07:01  -  05 Feb 2022 07:00  --------------------------------------------------------  IN: 0 mL / OUT: 1150 mL / NET: -1150 mL          PHYSICAL EXAM:  Vital Signs Last 24 Hrs  T(C): --  T(F): --  HR: --  BP: --  BP(mean): --  RR: --  SpO2: --      GENERAL: pt examined bedside, appeared uncomfortable in moderate distress  HEENT: NC/AT, dry oral mucosa, clear conjunctiva, sclera nonicteric  RESPIRATORY: poor inspiratory effort, no wheezing, rhonchi, rales  CARDIOVASCULAR: RRR, normal S1 and S2  ABDOMEN: soft, NT/ND, normoactive bowel sounds, no rebound/guarding  EXTREMITIES: No cynaosis, no clubbing, no lower extremity edema; Peripheral pulses are 2+ bilaterally  NEUROLOGY: no focal neurologic deficits appreciated   SKIN: No rashes or no palpable lesions        LABS:      Culture - Blood (collected 03 Feb 2022 04:18)  Source: .Blood Blood-Peripheral  Preliminary Report (05 Feb 2022 05:01):    No growth at 48 hours    Culture - Blood (collected 03 Feb 2022 04:18)  Source: .Blood Blood-Peripheral  Preliminary Report (05 Feb 2022 05:00):    No growth at 48 hours      CAPILLARY BLOOD GLUCOSE            RADIOLOGY & ADDITIONAL TESTS:          MEDICATIONS  (STANDING):  HYDROmorphone Infusion 1 mG/Hr (1 mL/Hr) IV Continuous <Continuous>    MEDICATIONS  (PRN):  acetaminophen     Tablet .. 650 milliGRAM(s) Oral every 6 hours PRN Mild Pain (1 - 3)  acetaminophen   IVPB .. 1000 milliGRAM(s) IV Intermittent once PRN Temp greater or equal to 38C (100.4F)  bisacodyl Suppository 10 milliGRAM(s) Rectal daily PRN Constipation  haloperidol    Injectable 1 milliGRAM(s) IV Push every 4 hours PRN Agitation  HYDROmorphone  Injectable 1 milliGRAM(s) IV Push every 1 hour PRN pain, shortness of breath  LORazepam   Injectable 1 milliGRAM(s) IV Push every 3 hours PRN Anxiety  ondansetron Injectable 4 milliGRAM(s) IV Push every 6 hours PRN Nausea and/or Vomiting

## 2022-02-05 NOTE — PROVIDER CONTACT NOTE (OTHER) - ACTION/TREATMENT ORDERED:
Will give additional dose of hydralazine early and add PO medication to regimen for control.
Per PA "OK" will continue to monitor.
dilaudid gtt increased to 1 mg/hr with titration orders  breakthrough meds to be adjusted
Will reinsert ortega.
Oxycodone 5mg PO ordered

## 2022-02-05 NOTE — PROVIDER CONTACT NOTE (OTHER) - DATE AND TIME:
03-Feb-2022 08:00
29-Jan-2022 00:15
23-Jan-2022 07:45
16-Jan-2022 23:50
23-Jan-2022 08:30
05-Feb-2022 07:40

## 2022-02-05 NOTE — PROVIDER CONTACT NOTE (OTHER) - SITUATION
Patient on comfort care  dilaudid gtt at 0.1 mg/hr  breakthrough dilaudid 0.3 mg IVP  breakthrough ativan 0.5 mg IVP    standing dilaudid and breakthrough medication not working
Pt's SBP continues to be >140 despite treatment with PRN antihypertensives.
Pt's bladder scan shows a volume >500. Patient has already been straight cathed 3 times for retention.
patient c/o 10/10 headache  VSS  neuro check remains unchanged
MOLST in pt chart states DNR, DNI and comfort care. in electronic chart comfort care is not ordered. notes from over night state pt is not yet comfort care.

## 2022-02-06 NOTE — PROGRESS NOTE ADULT - SUBJECTIVE AND OBJECTIVE BOX
[facility]  Florin Cardenas, Palliative Care Attending  Contact Info: message on [employer] Teams (Florin Cardenas)      SUBJECTIVE AND OBJECTIVE:  INTERVAL HPI/OVERNIGHT EVENTS: No acute events overnight. Family reports periods of breathing irregularity overnight but now stable. No reported restless or agitation. Patient required no additional PRNs in past 24hrs. No unexpected adverse effects of opiates noted. Patient unable to participate in interview.    Allergies  Bananas (Angioedema; Anaphylaxis; Short breath)  Kiwi (Angioedema; Anaphylaxis; Short breath)  latex (Unknown)  Rock (Angioedema; Anaphylaxis; Short breath)  No Known Drug Allergies  strawberry (Angioedema; Anaphylaxis; Short breath)    MEDICATIONS  (STANDING):  HYDROmorphone Infusion 1 mG/Hr (1 mL/Hr) IV Continuous <Continuous>    MEDICATIONS  (PRN):  acetaminophen     Tablet .. 650 milliGRAM(s) Oral every 6 hours PRN Mild Pain (1 - 3)  acetaminophen   IVPB .. 1000 milliGRAM(s) IV Intermittent once PRN Temp greater or equal to 38C (100.4F)  bisacodyl Suppository 10 milliGRAM(s) Rectal daily PRN Constipation  haloperidol    Injectable 1 milliGRAM(s) IV Push every 4 hours PRN Agitation  HYDROmorphone  Injectable 1 milliGRAM(s) IV Push every 1 hour PRN pain, shortness of breath  LORazepam   Injectable 1 milliGRAM(s) IV Push every 3 hours PRN Anxiety  ondansetron Injectable 4 milliGRAM(s) IV Push every 6 hours PRN Nausea and/or Vomiting      ITEMS UNCHECKED ARE NOT PRESENT  PRESENT SYMPTOMS: [x]Unable to obtain due to poor mentation   Source if other than patient:  []Family   []Team     Pain: [ ] yes [x] no - see PAINAD  QOL impact -   Location -                    Aggravating factors -  Quality -  Radiation -  Timing -  Severity (0-10 scale):  Minimal acceptable level (0-10 scale):    PAIN AD Score: 0    Dyspnea:                           []Mild  []Moderate []Severe  Anxiety:                             []Mild []Moderate []Severe  Fatigue:                             []Mild []Moderate []Severe  Nausea:                             []Mild []Moderate []Severe  Loss of appetite:              []Mild []Moderate []Severe  Constipation:                    []Mild []Moderate []Severe    Other Symptoms:  []All other review of systems negative     Palliative Performance Status Version 2: 10%    PHYSICAL EXAM:  Vital Signs Last 24 Hrs  T(C): --  T(F): --  HR: --  BP: --  BP(mean): --  RR: --  SpO2: --    GENERAL:  []Alert  []Oriented x   []Lethargic  []Cachexia  [x]Unarousable  []Verbal  [x]Non-Verbal  Behavioral:   [] Anxiety  [x] Delirium [] Agitation [] Cooperative  HEENT:  []Normal   [x]Dry mouth   []ET Tube/Trach  []Oral lesions  PULMONARY:   [x]Clear []Tachypnea  []Audible excessive secretions   []Rhonchi        []Right []Left [x]Bilateral  []Crackles        []Right []Left []Bilateral  []Wheezing     []Right []Left []Bilateral  CARDIOVASCULAR:    [x]Regular []Irregular []Tachy  []Hu []Murmur []Other  GASTROINTESTINAL:  [x]Soft  [x]Distended   [x]+BS  [x]Non tender []Tender  []PEG []OGT/ NGT  Last BM:  GENITOURINARY:  []Normal [x] Incontinent   []Oliguria/Anuria   []Orozco  MUSCULOSKELETAL:   []Normal   [x]Weakness  [x]Bed/Wheelchair bound []Edema  NEUROLOGIC:   []No focal deficits  [] Cognitive impairment  [x] Dysphagia []Dysarthria [] Paresis [x]Encephalopathic  SKIN:   [x]Normal   []Pressure ulcer(s)  []Rash    LABS: None new    RADIOLOGY & ADDITIONAL STUDIES: None New    DISCUSSED CASE WITH: Ewa / Jamal - for emotional support and discussion of inpatient hospice

## 2022-02-06 NOTE — PROGRESS NOTE ADULT - ASSESSMENT
80 yo female with a history of HTN, presents to [facility] as a [place] [facility] [place] work up of a brain mass that was found on imaging, following her presentation there for a trip and fall.  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  Postoperatively, patient with stroke-like symptoms, CT head showed acute hemorrhage along the biopsy tract in L parietal lobe extending to the mass within the posterior corpus callosum. An MRI of the abdomen showed stricturing mass at the junction of the medial and lateral left lobes. 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  S/p Liver Biopsy on 2/1/22.    Can check a set of vitals to assess stability for transfer to inpatient hospice but family is unsure if they would be accepting, worried about transporting the patient.    #dyspnea/pain:  -c/w Dilaudid gtt at 1mg/hr  -c/w Dilaudid 1mg IV q1h PRN for Pain and/or RR >24  -c/w Ativan 1mg IV q3h PRN for Anxiety/Agitation  -if symptoms escalate or are refractory to PRNs, escalate to Dilaudid 2mg IV q1h PRN for Pain and/or RR >24 and increase Dilaudid gtt @1.5mg/hr    #encephalopathy:  -Likely multifactorial in the setting of recent surgery, malignancy, leukocytosis  -c/w Ativan PRNs as noted above  -c/w Haldol 1mg IV q4h PRN for Refractory Agitation (since there is no reliable PO route)    #constipation:  -c/w bisacodyl 10 mg DE suppository daily PRN (no BM in >48 hrs)    #DLBCL, liver mass:  -no planned tx  -hospice appropriate    #debility  -supportive care    #palliative care encounter:  -DNR/I/comfort measures only  -No HCP on file; patient's children are equal surrogates. Gm is acting as spokesperson for pt's children.  -inpatient hospice appropriate, family is concerned about transporting the patient  -Continued emotional support provided, discussed the benefits of inpatient hospice transfer if hemodynamically stable 82 yo female with a history of HTN, presents to [facility] as a transfer from [facility] [place] for work up of a brain mass that was found on imaging, following her presentation there for a trip and fall.  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  Postoperatively, patient with stroke-like symptoms, CT head showed acute hemorrhage along the biopsy tract in L parietal lobe extending to the mass within the posterior corpus callosum. An MRI of the abdomen showed stricturing mass at the junction of the medial and lateral left lobes. 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  S/p Liver Biopsy on 2/1/22.    Can check a set of vitals to assess stability for transfer to inpatient hospice but family is unsure if they would be accepting, worried about transporting the patient.    #dyspnea/pain:  -c/w Dilaudid gtt at 1mg/hr  -c/w Dilaudid 1mg IV q1h PRN for Pain and/or RR >24  -c/w Ativan 1mg IV q3h PRN for Anxiety/Agitation  -if symptoms escalate or are refractory to PRNs, escalate to Dilaudid 2mg IV q1h PRN for Pain and/or RR >24 and increase Dilaudid gtt @1.5mg/hr    #encephalopathy:  -Likely multifactorial in the setting of recent surgery, malignancy, leukocytosis  -c/w Ativan PRNs as noted above  -c/w Haldol 1mg IV q4h PRN for Refractory Agitation (since there is no reliable PO route)    #constipation:  -c/w bisacodyl 10 mg LA suppository daily PRN (no BM in >48 hrs)    #DLBCL, liver mass:  -no planned tx  -hospice appropriate    #debility  -supportive care    #palliative care encounter:  -DNR/I/comfort measures only  -No HCP on file; patient's children are equal surrogates. Gm is acting as spokesperson for pt's children.  -inpatient hospice appropriate, family is concerned about transporting the patient  -Continued emotional support provided, discussed the benefits of inpatient hospice transfer if hemodynamically stable

## 2022-02-06 NOTE — PROGRESS NOTE ADULT - ASSESSMENT
82 y/o F w/ PMH of HTN, brain mass was transfered to [facility] from [facility] [place] for w/u of brain mass.  Pt s/p brain biopsy on 1/19/22, which revealed diffuse large B-cell lymphoma.  Postoperatively pt had stroke-like symptoms and CTH showed acute hemorrhage along the biopsy tract in L-parietal lobe extending to the mass w/in the posterior corpus callosum.   MRI of abdomen showed stricturing mass at the junction of the medial and lateral lobes of liver suspicious for primary biliary neoplasm.  S/p Liver Biopsy on 2/1/22.  Pt now DNR DNI and made comfort care.  Plan was for hospice inn however to unstable to send at this time w/ agonal breathing.       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

## 2022-02-06 NOTE — PROGRESS NOTE ADULT - SUBJECTIVE AND OBJECTIVE BOX
Chief Complaint:  brain mass    SUBJECTIVE / OVERNIGHT EVENTS: No actue events reported overnight.  Pt this morning appears more comfortable.  Agonal breathing noted.  Pt unable to provide ROS.          I&O's Summary    04 Feb 2022 07:01  -  05 Feb 2022 07:00  --------------------------------------------------------  IN: 0 mL / OUT: 1150 mL / NET: -1150 mL          PHYSICAL EXAM:  Vital Signs Last 24 Hrs  T(C): --  T(F): --  HR: --  BP: --  BP(mean): --  RR: --  SpO2: --      GENERAL: pt examined bedside, appears comfortable in NAD, agonal breathing  HEENT: NC/AT, dry oral mucosa, clear conjunctiva, sclera nonicteric  RESPIRATORY: poor inspiratory effort, no wheezing, rhonchi, rales  CARDIOVASCULAR: RRR, normal S1 and S2  ABDOMEN: soft, NT/ND, normoactive bowel sounds, no rebound/guarding  EXTREMITIES: No cynaosis, no clubbing, no lower extremity edema; Peripheral pulses are 2+ bilaterally  NEUROLOGY: no focal neurologic deficits appreciated   SKIN: No rashes or no palpable lesions        LABS:      Culture - Blood (collected 03 Feb 2022 04:18)  Source: .Blood Blood-Peripheral  Preliminary Report (05 Feb 2022 05:01):    No growth at 48 hours    Culture - Blood (collected 03 Feb 2022 04:18)  Source: .Blood Blood-Peripheral  Preliminary Report (05 Feb 2022 05:00):    No growth at 48 hours      CAPILLARY BLOOD GLUCOSE            RADIOLOGY & ADDITIONAL TESTS:          MEDICATIONS  (STANDING):  HYDROmorphone Infusion 1 mG/Hr (1 mL/Hr) IV Continuous <Continuous>    MEDICATIONS  (PRN):  acetaminophen     Tablet .. 650 milliGRAM(s) Oral every 6 hours PRN Mild Pain (1 - 3)  acetaminophen   IVPB .. 1000 milliGRAM(s) IV Intermittent once PRN Temp greater or equal to 38C (100.4F)  bisacodyl Suppository 10 milliGRAM(s) Rectal daily PRN Constipation  haloperidol    Injectable 1 milliGRAM(s) IV Push every 4 hours PRN Agitation  HYDROmorphone  Injectable 1 milliGRAM(s) IV Push every 1 hour PRN pain, shortness of breath  LORazepam   Injectable 1 milliGRAM(s) IV Push every 3 hours PRN Anxiety  ondansetron Injectable 4 milliGRAM(s) IV Push every 6 hours PRN Nausea and/or Vomiting

## 2022-02-07 NOTE — PROGRESS NOTE ADULT - PROVIDER SPECIALTY LIST ADULT
Anesthesia
Brain Injury Medicine
Brain Injury Medicine
Heme/Onc
Hospitalist
Neurosurgery
Neurosurgery
Brain Injury Medicine
Gastroenterology
Gastroenterology
Heme/Onc
Hospitalist
Internal Medicine
Neurosurgery
Palliative Care
Brain Injury Medicine
Gastroenterology
Hospitalist
NSICU
Neurosurgery
Palliative Care
Palliative Care
Gastroenterology
Hospitalist
Internal Medicine
NSICU
Neurosurgery
Gastroenterology
NSICU
Neurosurgery
Palliative Care
Gastroenterology
Gastroenterology
Neurosurgery
NSICU

## 2022-02-07 NOTE — PROGRESS NOTE ADULT - NUTRITIONAL ASSESSMENT
This patient has been assessed with a concern for Malnutrition and has been determined to have a diagnosis/diagnoses of Moderate protein-calorie malnutrition.    This patient is being managed with:   Diet Regular-  Soft and Bite Sized (SOFTBTSZ)  Supplement Feeding Modality:  Oral  Ensure Enlive Cans or Servings Per Day:  1       Frequency:  Three Times a day  Entered: Jan 27 2022  4:59PM    
This patient has been assessed with a concern for Malnutrition and has been determined to have a diagnosis/diagnoses of Moderate protein-calorie malnutrition.    This patient is being managed with:   Diet NPO after Midnight-     NPO Start Date: 31-Jan-2022   NPO Start Time: 23:59  Entered: Jan 31 2022  8:36AM    Diet Regular-  Soft and Bite Sized (SOFTBTSZ)  Supplement Feeding Modality:  Oral  Ensure Enlive Cans or Servings Per Day:  1       Frequency:  Three Times a day  Entered: Jan 27 2022  4:59PM    
This patient has been assessed with a concern for Malnutrition and has been determined to have a diagnosis/diagnoses of Moderate protein-calorie malnutrition.    This patient is being managed with:   Diet NPO after Midnight-     NPO Start Date: 31-Jan-2022   NPO Start Time: 23:59  Entered: Jan 31 2022  8:36AM    Diet Regular-  Soft and Bite Sized (SOFTBTSZ)  Supplement Feeding Modality:  Oral  Ensure Enlive Cans or Servings Per Day:  1       Frequency:  Three Times a day  Entered: Jan 27 2022  4:59PM    
This patient has been assessed with a concern for Malnutrition and has been determined to have a diagnosis/diagnoses of Moderate protein-calorie malnutrition.    This patient is being managed with:   Diet NPO-  Except Medications  Entered: Feb 2 2022  1:28PM    
This patient has been assessed with a concern for Malnutrition and has been determined to have a diagnosis/diagnoses of Moderate protein-calorie malnutrition.    This patient is being managed with:   Diet NPO-  Except Medications  Entered: Feb 2 2022  1:28PM    
This patient has been assessed with a concern for Malnutrition and has been determined to have a diagnosis/diagnoses of Moderate protein-calorie malnutrition.    This patient is being managed with:   Diet Regular-  Soft and Bite Sized (SOFTBTSZ)  Supplement Feeding Modality:  Oral  Ensure Enlive Cans or Servings Per Day:  1       Frequency:  Three Times a day  Entered: Jan 27 2022  4:59PM    
This patient has been assessed with a concern for Malnutrition and has been determined to have a diagnosis/diagnoses of Moderate protein-calorie malnutrition.    This patient is being managed with:   Diet Regular-  Soft and Bite Sized (SOFTBTSZ)  Supplement Feeding Modality:  Oral  Ensure Enlive Cans or Servings Per Day:  1       Frequency:  Three Times a day  Entered: Jan 27 2022  4:59PM      This patient has been assessed with a concern for Malnutrition and has been determined to have a diagnosis/diagnoses of Moderate protein-calorie malnutrition.    This patient is being managed with:   Diet Regular-  Soft and Bite Sized (SOFTBTSZ)  Supplement Feeding Modality:  Oral  Ensure Enlive Cans or Servings Per Day:  1       Frequency:  Three Times a day  Entered: Jan 27 2022  4:59PM    
This patient has been assessed with a concern for Malnutrition and has been determined to have a diagnosis/diagnoses of Moderate protein-calorie malnutrition.    This patient is being managed with:   Diet NPO-  Except Medications  Entered: Feb 2 2022  1:28PM    
This patient has been assessed with a concern for Malnutrition and has been determined to have a diagnosis/diagnoses of Moderate protein-calorie malnutrition.    This patient is being managed with:   Diet Regular-  Soft and Bite Sized (SOFTBTSZ)  Supplement Feeding Modality:  Oral  Ensure Enlive Cans or Servings Per Day:  1       Frequency:  Three Times a day  Entered: Jan 27 2022  4:59PM    
This patient has been assessed with a concern for Malnutrition and has been determined to have a diagnosis/diagnoses of Moderate protein-calorie malnutrition.    This patient is being managed with:   Diet NPO-  Except Medications  Entered: Feb 2 2022  1:28PM    
Diet, NPO:   Except Medications (01-14-22 @ 06:50) [Active]

## 2022-02-07 NOTE — PROGRESS NOTE ADULT - ASSESSMENT
80 yo F transferred [facility] for a brain mass seen on imaging "Large B Cell Lymphoma" diagnosed after Bx with suspicious for Primary Biliary Neoplasm.    Actively Dying  Respiratory Pattern and Mental status have changed  No longer responsive to her family or staff  Comfort measures including Dilaudid 1mg IV infusion-continued  Agonal breathing pattern with periods of apnea  Appears comfortable    Family distraught  Spent time at her bedside, spoke to her daughter, refusing "another visit from a "  A  from her Parish came by, offered prayers and support yesterday    GOC  Shifted to  "comfort measures"  In-Pt Hospice contacted yesterday  Reluctant to transport to facility, may not be able to tolerate the ride.  Decision to have patient remain here on Dilaudid Infusion was made  DNR/DNI  Patient dies this afternoon about 1400 hrs  Family notified

## 2022-02-07 NOTE — DISCHARGE NOTE FOR THE EXPIRED PATIENT - HOSPITAL COURSE
82 y/o F w/ PMH of HTN, brain mass was transferred to [facility] from [facility] [place] for w/u of brain mass.  Pt s/p brain biopsy on 1/19/22, which revealed diffuse large B-cell lymphoma.  Postoperatively pt had stroke-like symptoms and CTH showed acute hemorrhage along the biopsy tract in L-parietal lobe extending to the mass w/in the posterior corpus callosum.   MRI of abdomen showed stricturing mass at the junction of the medial and lateral lobes of liver suspicious for primary biliary neoplasm.  S/p Liver Biopsy on 2/1/22.  Pt now DNR DNI and made comfort care.  Plan was for hospice inn however to unstable to send at this time w/ agonal breathing.    80 y/o F w/ PMH of HTN, brain mass was transferred to [facility] [place] [facility] [place]/u of brain [place].  Pt s/p brain biopsy on 1/19/22, which revealed diffuse large B-cell lymphoma.  Postoperatively pt had stroke-like symptoms and CTH showed acute hemorrhage along the biopsy tract in L-parietal lobe extending to the mass w/in the posterior corpus callosum. MRI of abdomen showed stricturing mass at the junction of the medial and lateral lobes of liver suspicious for primary biliary neoplasm.  S/p Liver Biopsy on 2/1/22. Palliative consulted. [facility] discussion 2/3 with shahid Mcneil with decision to maintain DNR/DNI status but to change focus of care now aimed at easing patient's pain and placed patient on comfort measures with DC of IVF, Abxs, steroids, blood draws or any invasive procedures. MOSLT completed and signed. Hospice consulted. Initial plan was for [facility] however patient was unstable to send at time w/ agonal breathing. Dilaudid provided for dyspnea and pain. Continued emotional support provided to family. Patient's vital signs continued to worsen and patient was unstable for transport. Family opted to keep her inpatient on site. 2/7/22 called by RN for cease of spontaneous respirations. Pt seen and examined at bedside. Pt unresponsive to voice, tactile stimuli and sternal rub. Pupils fixed and unreactive to light. No corneal reflex. No palpable carotid, or radial pulses. No cardiac sounds auscultated. No spontaneous respirations. No breath sounds auscultated. Patient pronounced at 14:20. Attending notified. Shahid Mcneil notified via phone and agreed to return bedside for final goodbye of body. Emotional support provided, all questions answered.

## 2022-02-07 NOTE — PROGRESS NOTE ADULT - REASON FOR ADMISSION
Brain mass
corpus callosum tumor
Brain mass

## 2022-02-07 NOTE — DISCHARGE NOTE PROVIDER - HOSPITAL COURSE
80 y/o F w/ PMH of HTN, brain mass was transfered to [facility] from [facility] [place] for w/u of brain mass.  Pt s/p brain biopsy on 22, which revealed diffuse large B-cell lymphoma.  Postoperatively pt had stroke-like symptoms and CTH showed acute hemorrhage along the biopsy tract in L-parietal lobe extending to the mass w/in the posterior corpus callosum.   MRI of abdomen showed stricturing mass at the junction of the medial and lateral lobes of liver suspicious for primary biliary neoplasm.  S/p Liver Biopsy on 22.  Pt now DNR DNI and made comfort care.  Plan was for hospice inn however unable to send as pt was too unstable.  Pt .

## 2022-02-07 NOTE — PROVIDER CONTACT NOTE (CRITICAL VALUE NOTIFICATION) - TEST AND RESULT REPORTED:
+ blood culture from 2/3/22 BC + anaerobic bottle gram - rods
+blood cultures: anaerobic bottle from 2/3 - gram negative rods

## 2022-02-07 NOTE — DISCHARGE NOTE PROVIDER - NSDCCPCAREPLAN_GEN_ALL_CORE_FT
PRINCIPAL DISCHARGE DIAGNOSIS  Diagnosis: Brain mass  Assessment and Plan of Treatment: - comfort care and pt

## 2022-02-07 NOTE — PROGRESS NOTE ADULT - ASSESSMENT
80 y/o F w/ PMH of HTN, brain mass [place] [facility] [place] [facility] [place].  Pt s/p brain biopsy on 1/19/22, which revealed diffuse large B-cell lymphoma.  Postoperatively pt had stroke-like symptoms and CTH showed acute hemorrhage along the biopsy tract in L-parietal lobe extending to the mass w/in the posterior corpus callosum.   MRI of abdomen showed stricturing mass at the junction of the medial and lateral lobes of liver suspicious for primary biliary neoplasm.  S/p Liver Biopsy on 2/1/22.  Pt now DNR DNI and made comfort care.  Plan was for hospice inn however to unstable to send at this time w/ agonal breathing.       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

## 2022-02-07 NOTE — PROGRESS NOTE ADULT - NSPROGADDITIONALINFOA_GEN_ALL_CORE
Total Time Spent__30__ minutes  This includes chart review, patient assessment, discussion and collaboration with interdisciplinary team members, ACP planning    COUNSELING:  Face to face meeting to discuss Advanced Care Planning - Time Spent 10______Minutes.      Thank you for the opportunity to assist with the care of this patient.   [facility] Palliative Medicine Consult Service [phone].
82 y/o female for Left crani for tumor bx    Pmhx: HTN. Abnl hepatic mass? possible primary neoplasm  PSHX: N/C  Latex allergy andfruit  Meds: Keppra, hydralazine, labetalol  1-2 glasses wine OD  Labs: 40 hct, 1.04 INR, 0.38 Creat  NSR poss LAE    ASA 3
Requiring ordering/titration/monitoring of parenteral controlled substances for adequate symptom management at EOL

## 2022-02-07 NOTE — PROGRESS NOTE ADULT - SUBJECTIVE AND OBJECTIVE BOX
OVERNIGHT EVENTS:  No new events overnight  Patient seen earlier this morning, No longer responsive  Breathing pattern changing, RR 14 with periods of apnea  Airway clear, secretions managed  RUE cooler than Left, and cyanotic fingers and hand  Dilaudid infusion at 1mg/hr continuous.  Daughter, remained at her bedside throughout her last few days  She wanted to go home and take a shower, but reluctant due to needing another rapid COVID test, and what if after all this time, her Mom should pass away alone.  I reassured her, that if she should pass away soon, she is comfortable and it would be her choice to leave this Earth, without her family present.  Patient passed away comfortably, not long after her daughter went home to shower.  Family notified, they will return shortly    Present Symptoms:     Dyspnea: Mild   Nausea/Vomiting: No  Anxiety:   No  Depression: No  Fatigue: Yes   Loss of appetite: Yes NPO  Constipation: no    Pain: On dilaudid infusion, no pain behaviors observed            Character-            Duration-            Effect-            Factors-            Frequency-            Location-            Severity-    Review of Systems: Reviewed                                        Unable to obtain due to poor mentation       MEDICATIONS  (STANDING):  HYDROmorphone Infusion 1 mG/Hr (1 mL/Hr) IV Continuous <Continuous>    MEDICATIONS  (PRN):  acetaminophen     Tablet .. 650 milliGRAM(s) Oral every 6 hours PRN Mild Pain (1 - 3)  acetaminophen   IVPB .. 1000 milliGRAM(s) IV Intermittent once PRN Temp greater or equal to 38C (100.4F)  bisacodyl Suppository 10 milliGRAM(s) Rectal daily PRN Constipation  haloperidol    Injectable 1 milliGRAM(s) IV Push every 4 hours PRN AgitationHYDROmorphone  Injectable 1 milliGRAM(s) IV Push every 1 hour PRN pain, shortness of breath  LORazepam   Injectable 1 milliGRAM(s) IV Push every 3 hours PRN Anxiety  ondansetron Injectable 4 milliGRAM(s) IV Push every 6 hours PRN Nausea and/or Vomiting    PHYSICAL EXAM:    Vital Signs Last 24 Hrs  T(C): --  T(F): --  HR: --  BP: --  BP(mean): --  RR: --  SpO2: --    General:                   cachexia  nonverbal  coma    Karnofsky:  10%    HEENT:   dry mouth      Lungs: comfortable agonal, apneic    CV: normal      GI: normal                 : incontinent  Orozco catheter    MSK:  weakness             bedbound/    Skin:   no rash    LABS:    I&O's Summary      RADIOLOGY & ADDITIONAL STUDIES:  < from: CT Abdomen and Pelvis No Cont (02.02.22 @ 18:50) >  BOWEL: No bowel obstruction. Appendix not visualized. Distended stomach   and small bowel, likely adynamic ileus. Esophagogastric tube ending in   the distal stomach. Multiple colonic diverticula. Mural thickening   ascending colon consistent with colitis.  PERITONEUM: New moderate to large ascites which does not measure in the  range of hemoperitoneum. Large pneumoperitoneum.  VESSELS: Within normal limits.  RETROPERITONEUM/LYMPH NODES: No lymphadenopathy.  ABDOMINAL WALL: Within normal limits.  BONES: Degenerative change.    IMPRESSION:  New moderate to large ascites which appears simple.  Large pneumoperitoneum without evident source.  Ascending colitis.  Small bilateral pleural effusions.  Atrophic left hepatic lobe as previously demonstrated    Findings discussed with Dr. Jimenez on 02/02/2022 at 8:20 PM  with read  back.    ADVANCE DIRECTIVES/TREATMENT PREFERENCES:  DNR YES   Completed on:                     MOLST  YES    Completed on:  Living Will   NO   Completed on:

## 2022-02-07 NOTE — DISCHARGE NOTE PROVIDER - DETAILS OF MALNUTRITION DIAGNOSIS/DIAGNOSES
This patient has been assessed with a concern for Malnutrition and was treated during this hospitalization for the following Nutrition diagnosis/diagnoses:     -  01/31/2022: Moderate protein-calorie malnutrition

## 2022-02-07 NOTE — DISCHARGE NOTE PROVIDER - ATTENDING DISCHARGE PHYSICAL EXAMINATION:
Vital Signs Last 24 Hrs  T(C): --  T(F): --  HR: --  BP: --  BP(mean): --  RR: --  SpO2: --    GENERAL: pt examined bedside, appears comfortable in NAD, agonal breathing  HEENT: NC/AT, dry oral mucosa, clear conjunctiva, sclera nonicteric  RESPIRATORY: poor inspiratory effort, no wheezing, rhonchi, rales  CARDIOVASCULAR: RRR, normal S1 and S2  ABDOMEN: soft, NT/ND, normoactive bowel sounds, no rebound/guarding  EXTREMITIES: No cynaosis, no clubbing, no lower extremity edema; Peripheral pulses are 2+ bilaterally  NEUROLOGY: no focal neurologic deficits appreciated   SKIN: No rashes or no palpable lesions

## 2022-02-08 LAB
CULTURE RESULTS: SIGNIFICANT CHANGE UP
NON-GYNECOLOGICAL CYTOLOGY STUDY: SIGNIFICANT CHANGE UP
SPECIMEN SOURCE: SIGNIFICANT CHANGE UP

## 2022-02-17 NOTE — CHART NOTE - NSCHARTNOTEFT_GEN_A_CORE
Palliative care social work note.    Bereavement call made to patients daughter Paula. Support and resources offered.

## 2022-02-17 NOTE — CHART NOTE - NSCHARTNOTESELECT_GEN_ALL_CORE
Event Note
Nutrition Services
Plan of Care Note

## 2024-07-29 NOTE — DIETITIAN INITIAL EVALUATION ADULT. - ENERGY INTAKE
Attempted to contact regarding CCM monthly outreach. Voicemail requesting call back. Number given.    Good (>75%)
